# Patient Record
Sex: FEMALE | Race: WHITE | NOT HISPANIC OR LATINO
[De-identification: names, ages, dates, MRNs, and addresses within clinical notes are randomized per-mention and may not be internally consistent; named-entity substitution may affect disease eponyms.]

---

## 2017-01-11 ENCOUNTER — CHARTING TRANS (OUTPATIENT)
Dept: OTHER | Age: 47
End: 2017-01-11

## 2017-01-27 ENCOUNTER — CHARTING TRANS (OUTPATIENT)
Dept: OTOLARYNGOLOGY | Age: 47
End: 2017-01-27

## 2017-01-27 ENCOUNTER — MYAURORA ACCOUNT LINK (OUTPATIENT)
Dept: OTHER | Age: 47
End: 2017-01-27

## 2017-01-27 ENCOUNTER — LAB SERVICES (OUTPATIENT)
Dept: OTHER | Age: 47
End: 2017-01-27

## 2017-01-30 ENCOUNTER — CHARTING TRANS (OUTPATIENT)
Dept: OTHER | Age: 47
End: 2017-01-30

## 2017-01-30 LAB — FINAL REPORT: NORMAL

## 2017-02-17 ENCOUNTER — CHARTING TRANS (OUTPATIENT)
Dept: OTHER | Age: 47
End: 2017-02-17

## 2017-02-21 ENCOUNTER — IMAGING SERVICES (OUTPATIENT)
Dept: OTHER | Age: 47
End: 2017-02-21

## 2017-02-23 ENCOUNTER — CHARTING TRANS (OUTPATIENT)
Dept: OTHER | Age: 47
End: 2017-02-23

## 2017-03-09 ENCOUNTER — IMAGING SERVICES (OUTPATIENT)
Dept: OTHER | Age: 47
End: 2017-03-09

## 2017-03-09 ENCOUNTER — CHARTING TRANS (OUTPATIENT)
Dept: OTHER | Age: 47
End: 2017-03-09

## 2017-03-10 ENCOUNTER — CHARTING TRANS (OUTPATIENT)
Dept: ALLERGY | Age: 47
End: 2017-03-10

## 2017-04-20 ENCOUNTER — MYAURORA ACCOUNT LINK (OUTPATIENT)
Dept: OTHER | Age: 47
End: 2017-04-20

## 2017-04-20 ENCOUNTER — CHARTING TRANS (OUTPATIENT)
Dept: ALLERGY | Age: 47
End: 2017-04-20

## 2017-08-28 ENCOUNTER — CHARTING TRANS (OUTPATIENT)
Dept: OTHER | Age: 47
End: 2017-08-28

## 2017-09-12 ENCOUNTER — IMAGING SERVICES (OUTPATIENT)
Dept: OTHER | Age: 47
End: 2017-09-12

## 2017-09-16 ENCOUNTER — LAB SERVICES (OUTPATIENT)
Dept: OTHER | Age: 47
End: 2017-09-16

## 2017-09-16 ENCOUNTER — CHARTING TRANS (OUTPATIENT)
Dept: INTERNAL MEDICINE | Age: 47
End: 2017-09-16

## 2017-09-16 LAB
ALBUMIN SERPL BCG-MCNC: 4.5 G/DL (ref 3.6–5.1)
ALP SERPL-CCNC: 81 U/L (ref 45–105)
ALT SERPL W/O P-5'-P-CCNC: 14 U/L (ref 7–34)
AST SERPL-CCNC: 18 U/L (ref 9–37)
BILIRUB SERPL-MCNC: 0.6 MG/DL (ref 0–1)
BUN SERPL-MCNC: 14 MG/DL (ref 7–20)
CALCIUM SERPL-MCNC: 9.4 MG/DL (ref 8.6–10.6)
CHLORIDE SERPL-SCNC: 106 MMOL/L (ref 96–107)
CHOLEST SERPL-MCNC: 202 MG/DL (ref 140–200)
CREATININE, SERUM: 1 MG/DL (ref 0.5–1.4)
DIFFERENTIAL TYPE: NORMAL
GFR SERPL CREATININE-BSD FRML MDRD: >60 ML/MIN/{1.73M2}
GFR SERPL CREATININE-BSD FRML MDRD: >60 ML/MIN/{1.73M2}
GLUCOSE P FAST SERPL-MCNC: 103 MG/DL (ref 60–100)
HCO3 SERPL-SCNC: 25 MMOL/L (ref 22–32)
HDLC SERPL-MCNC: 94 MG/DL
HEMATOCRIT: 36.3 % (ref 34–45)
HEMOGLOBIN: 12.3 G/DL (ref 11.2–15.7)
LDLC SERPL CALC-MCNC: 96 MG/DL (ref 0–100)
LYMPH PERCENT: 35.8 % (ref 20.5–51.1)
LYMPHOCYTE ABSOLUTE #: 2.1 10*3/UL (ref 1.2–3.4)
MEAN CORPUSCULAR HGB CONCENTRATION: 33.9 % (ref 32–36)
MEAN CORPUSCULAR HGB: 31.5 PG (ref 27–34)
MEAN CORPUSCULAR VOLUME: 93.1 FL (ref 79–95)
MEAN PLATELET VOLUME: 9.9 FL (ref 8.6–12.4)
MIXED %: 7.6 % (ref 4.3–12.9)
MIXED ABSOLUTE #: 0.4 10*3/UL (ref 0.2–0.9)
NEUTROPHIL ABSOLUTE #: 3.3 10*3/UL (ref 1.4–6.5)
NEUTROPHIL PERCENT: 56.6 % (ref 34–73.5)
PLATELET COUNT: 233 10*3/UL (ref 150–400)
POTASSIUM SERPL-SCNC: 4.6 MMOL/L (ref 3.5–5.3)
PROT SERPL-MCNC: 7 G/DL (ref 6.2–8.1)
RED BLOOD CELL COUNT: 3.9 10*6/UL (ref 3.7–5.2)
RED CELL DISTRIBUTION WIDTH: 12.5 % (ref 11.3–14.8)
SODIUM SERPL-SCNC: 141 MMOL/L (ref 136–146)
TRIGL SERPL-MCNC: 60 MG/DL (ref 0–200)
TSH SERPL DL<=0.05 MIU/L-ACNC: 1.73 M[IU]/L (ref 0.3–4.82)
WHITE BLOOD CELL COUNT: 5.8 10*3/UL (ref 4–10)

## 2017-09-21 ENCOUNTER — CHARTING TRANS (OUTPATIENT)
Dept: OTHER | Age: 47
End: 2017-09-21

## 2017-09-22 ENCOUNTER — CHARTING TRANS (OUTPATIENT)
Dept: OTHER | Age: 47
End: 2017-09-22

## 2017-09-27 ENCOUNTER — LAB SERVICES (OUTPATIENT)
Dept: OTHER | Age: 47
End: 2017-09-27

## 2017-09-27 LAB
EST. AVERAGE GLUCOSE BLD GHB EST-MCNC: 105 MG/DL (ref 0–154)
HBA1C MFR BLD: 5.3 % (ref 4.2–6)

## 2017-10-03 LAB — ACL PAP: NORMAL

## 2017-11-21 ENCOUNTER — MYAURORA ACCOUNT LINK (OUTPATIENT)
Dept: OTHER | Age: 47
End: 2017-11-21

## 2017-11-21 ENCOUNTER — CHARTING TRANS (OUTPATIENT)
Dept: URGENT CARE | Age: 47
End: 2017-11-21

## 2017-11-21 ASSESSMENT — PAIN SCALES - GENERAL: PAINLEVEL_OUTOF10: 8

## 2017-11-27 ENCOUNTER — CHARTING TRANS (OUTPATIENT)
Dept: OTHER | Age: 47
End: 2017-11-27

## 2018-04-26 ENCOUNTER — MYAURORA ACCOUNT LINK (OUTPATIENT)
Dept: OTHER | Age: 48
End: 2018-04-26

## 2018-04-26 ENCOUNTER — CHARTING TRANS (OUTPATIENT)
Dept: OTHER | Age: 48
End: 2018-04-26

## 2018-06-04 ENCOUNTER — CHARTING TRANS (OUTPATIENT)
Dept: OTHER | Age: 48
End: 2018-06-04

## 2018-06-26 ENCOUNTER — IMAGING SERVICES (OUTPATIENT)
Dept: OTHER | Age: 48
End: 2018-06-26

## 2018-06-26 ENCOUNTER — ANCILLARY ORDERS (OUTPATIENT)
Dept: OTHER | Age: 48
End: 2018-06-26

## 2018-06-26 DIAGNOSIS — R92.8 OTHER ABNORMAL AND INCONCLUSIVE FINDINGS ON DIAGNOSTIC IMAGING OF BREAST: ICD-10-CM

## 2018-07-02 ENCOUNTER — CHARTING TRANS (OUTPATIENT)
Dept: OTHER | Age: 48
End: 2018-07-02

## 2018-07-26 ENCOUNTER — CHARTING TRANS (OUTPATIENT)
Dept: OTHER | Age: 48
End: 2018-07-26

## 2018-07-27 ENCOUNTER — CHARTING TRANS (OUTPATIENT)
Dept: OTHER | Age: 48
End: 2018-07-27

## 2018-08-11 ENCOUNTER — CHARTING TRANS (OUTPATIENT)
Dept: OTHER | Age: 48
End: 2018-08-11

## 2018-08-11 ENCOUNTER — MYAURORA ACCOUNT LINK (OUTPATIENT)
Dept: OTHER | Age: 48
End: 2018-08-11

## 2018-08-11 ASSESSMENT — PAIN SCALES - GENERAL: PAINLEVEL_OUTOF10: 3

## 2018-09-24 ENCOUNTER — CHARTING TRANS (OUTPATIENT)
Dept: OTHER | Age: 48
End: 2018-09-24

## 2018-10-05 ENCOUNTER — CHARTING TRANS (OUTPATIENT)
Dept: OTHER | Age: 48
End: 2018-10-05

## 2018-10-05 ENCOUNTER — MYAURORA ACCOUNT LINK (OUTPATIENT)
Dept: OTHER | Age: 48
End: 2018-10-05

## 2018-10-06 ENCOUNTER — CHARTING TRANS (OUTPATIENT)
Dept: OTHER | Age: 48
End: 2018-10-06

## 2018-10-12 ENCOUNTER — LAB SERVICES (OUTPATIENT)
Dept: OTHER | Age: 48
End: 2018-10-12

## 2018-10-12 LAB
ALBUMIN SERPL BCG-MCNC: 4.4 G/DL (ref 3.6–5.1)
ALP SERPL-CCNC: 74 U/L (ref 45–130)
ALT SERPL W/O P-5'-P-CCNC: 14 U/L (ref 7–34)
AST SERPL-CCNC: 19 U/L (ref 9–37)
BILIRUB SERPL-MCNC: 1 MG/DL (ref 0–1)
BUN SERPL-MCNC: 12 MG/DL (ref 7–20)
CALCIUM SERPL-MCNC: 9.4 MG/DL (ref 8.6–10.6)
CHLORIDE SERPL-SCNC: 106 MMOL/L (ref 96–107)
CHOLEST SERPL-MCNC: 212 MG/DL (ref 140–200)
CREAT SERPL-MCNC: 0.9 MG/DL (ref 0.5–1.4)
DIFFERENTIAL TYPE: NORMAL
GFR SERPL CREATININE-BSD FRML MDRD: >60 ML/MIN/{1.73M2}
GFR SERPL CREATININE-BSD FRML MDRD: >60 ML/MIN/{1.73M2}
GLUCOSE P FAST SERPL-MCNC: 104 MG/DL (ref 60–100)
HCO3 SERPL-SCNC: 25 MMOL/L (ref 22–32)
HDLC SERPL-MCNC: 91 MG/DL
HEMATOCRIT: 40.6 % (ref 34–45)
HEMOGLOBIN: 14 G/DL (ref 11.2–15.7)
LDLC SERPL CALC-MCNC: 106 MG/DL (ref 0–100)
LYMPH PERCENT: 26.6 % (ref 20.5–51.1)
LYMPHOCYTE ABSOLUTE #: 1.7 10*3/UL (ref 1.2–3.4)
MEAN CORPUSCULAR HGB CONCENTRATION: 34.5 % (ref 32–36)
MEAN CORPUSCULAR HGB: 32.4 PG (ref 27–34)
MEAN CORPUSCULAR VOLUME: 94 FL (ref 79–95)
MEAN PLATELET VOLUME: 10.1 FL (ref 8.6–12.4)
MIXED %: 9.8 % (ref 4.3–12.9)
MIXED ABSOLUTE #: 0.6 10*3/UL (ref 0.2–0.9)
NEUTROPHIL ABSOLUTE #: 4.1 10*3/UL (ref 1.4–6.5)
NEUTROPHIL PERCENT: 63.6 % (ref 34–73.5)
PLATELET COUNT: 234 10*3/UL (ref 150–400)
POTASSIUM SERPL-SCNC: 4.4 MMOL/L (ref 3.5–5.3)
PROT SERPL-MCNC: 7.4 G/DL (ref 6.2–8.1)
RED BLOOD CELL COUNT: 4.32 10*6/UL (ref 3.7–5.2)
RED CELL DISTRIBUTION WIDTH: 12.8 % (ref 11.3–14.8)
SODIUM SERPL-SCNC: 142 MMOL/L (ref 136–146)
TRIGL SERPL-MCNC: 77 MG/DL (ref 0–200)
TSH SERPL DL<=0.05 MIU/L-ACNC: 1.05 M[IU]/L (ref 0.3–4.82)
WHITE BLOOD CELL COUNT: 6.4 10*3/UL (ref 4–10)

## 2018-10-16 ENCOUNTER — CHARTING TRANS (OUTPATIENT)
Dept: OTHER | Age: 48
End: 2018-10-16

## 2018-10-18 ENCOUNTER — CHARTING TRANS (OUTPATIENT)
Dept: OTHER | Age: 48
End: 2018-10-18

## 2018-10-31 ENCOUNTER — CHARTING TRANS (OUTPATIENT)
Dept: OTHER | Age: 48
End: 2018-10-31

## 2018-11-02 ENCOUNTER — CHARTING TRANS (OUTPATIENT)
Dept: OTHER | Age: 48
End: 2018-11-02

## 2018-11-27 VITALS
TEMPERATURE: 98.8 F | DIASTOLIC BLOOD PRESSURE: 94 MMHG | OXYGEN SATURATION: 97 % | HEART RATE: 100 BPM | RESPIRATION RATE: 18 BRPM | SYSTOLIC BLOOD PRESSURE: 132 MMHG

## 2018-11-28 VITALS
TEMPERATURE: 98.6 F | BODY MASS INDEX: 34.16 KG/M2 | DIASTOLIC BLOOD PRESSURE: 78 MMHG | HEIGHT: 60 IN | SYSTOLIC BLOOD PRESSURE: 146 MMHG | WEIGHT: 174 LBS | HEART RATE: 64 BPM

## 2018-11-28 VITALS
WEIGHT: 175 LBS | BODY MASS INDEX: 34.36 KG/M2 | HEART RATE: 71 BPM | OXYGEN SATURATION: 96 % | SYSTOLIC BLOOD PRESSURE: 150 MMHG | DIASTOLIC BLOOD PRESSURE: 110 MMHG | HEIGHT: 60 IN | TEMPERATURE: 98.6 F

## 2018-11-29 VITALS
SYSTOLIC BLOOD PRESSURE: 136 MMHG | HEIGHT: 60 IN | TEMPERATURE: 97.2 F | BODY MASS INDEX: 34.16 KG/M2 | HEART RATE: 80 BPM | WEIGHT: 174 LBS | DIASTOLIC BLOOD PRESSURE: 98 MMHG

## 2018-11-29 VITALS — HEIGHT: 60 IN | WEIGHT: 170 LBS | BODY MASS INDEX: 33.38 KG/M2

## 2018-12-10 ENCOUNTER — TELEPHONE (OUTPATIENT)
Dept: GASTROENTEROLOGY | Age: 48
End: 2018-12-10

## 2018-12-10 ENCOUNTER — OFFICE VISIT (OUTPATIENT)
Dept: GASTROENTEROLOGY | Age: 48
End: 2018-12-10

## 2018-12-10 DIAGNOSIS — Z12.11 SPECIAL SCREENING FOR MALIGNANT NEOPLASMS, COLON: Primary | ICD-10-CM

## 2018-12-10 LAB — PREGNANCY TEST, URINE: NEGATIVE

## 2018-12-10 PROCEDURE — G0121 COLON CA SCRN NOT HI RSK IND: HCPCS | Performed by: INTERNAL MEDICINE

## 2018-12-18 RX ORDER — LOSARTAN POTASSIUM 25 MG/1
TABLET ORAL
Qty: 90 TABLET | Refills: 0 | Status: SHIPPED | OUTPATIENT
Start: 2018-12-18 | End: 2019-04-19 | Stop reason: SDUPTHER

## 2019-01-03 ENCOUNTER — WALK IN (OUTPATIENT)
Dept: URGENT CARE | Age: 49
End: 2019-01-03

## 2019-01-03 DIAGNOSIS — J32.9 SINUSITIS, UNSPECIFIED CHRONICITY, UNSPECIFIED LOCATION: Primary | ICD-10-CM

## 2019-01-03 DIAGNOSIS — J98.01 COUGH DUE TO BRONCHOSPASM: ICD-10-CM

## 2019-01-03 DIAGNOSIS — J30.9 ALLERGIC RHINITIS, UNSPECIFIED SEASONALITY, UNSPECIFIED TRIGGER: ICD-10-CM

## 2019-01-03 PROBLEM — F41.1 GAD (GENERALIZED ANXIETY DISORDER): Status: ACTIVE | Noted: 2019-01-03

## 2019-01-03 PROBLEM — J30.2 SEASONAL ALLERGIES: Status: ACTIVE | Noted: 2019-01-03

## 2019-01-03 PROBLEM — J45.20 MILD INTERMITTENT ASTHMA WITHOUT COMPLICATION: Status: ACTIVE | Noted: 2017-04-20

## 2019-01-03 PROCEDURE — 99214 OFFICE O/P EST MOD 30 MIN: CPT | Performed by: FAMILY MEDICINE

## 2019-01-03 RX ORDER — PREDNISONE 20 MG/1
40 TABLET ORAL DAILY
Qty: 10 TABLET | Refills: 0 | Status: SHIPPED | OUTPATIENT
Start: 2019-01-03 | End: 2019-01-08

## 2019-01-03 RX ORDER — ALBUTEROL SULFATE 90 UG/1
AEROSOL, METERED RESPIRATORY (INHALATION)
Qty: 1 INHALER | Refills: 0 | Status: SHIPPED | OUTPATIENT
Start: 2019-01-03 | End: 2020-01-06 | Stop reason: ALTCHOICE

## 2019-01-03 RX ORDER — SERTRALINE HYDROCHLORIDE 25 MG/1
25 TABLET, FILM COATED ORAL
COMMUNITY
Start: 2011-12-22 | End: 2019-01-03 | Stop reason: SDUPTHER

## 2019-01-03 RX ORDER — AMOXICILLIN AND CLAVULANATE POTASSIUM 875; 125 MG/1; MG/1
1 TABLET, FILM COATED ORAL 2 TIMES DAILY
Qty: 20 TABLET | Refills: 0 | Status: SHIPPED | OUTPATIENT
Start: 2019-01-03 | End: 2019-01-13

## 2019-01-03 RX ORDER — MOMETASONE FUROATE 50 UG/1
2 SPRAY, METERED NASAL
COMMUNITY
Start: 2011-02-10 | End: 2021-05-10 | Stop reason: ALTCHOICE

## 2019-01-03 ASSESSMENT — PAIN SCALES - GENERAL: PAINLEVEL: 3-4

## 2019-01-04 RX ORDER — SERTRALINE HYDROCHLORIDE 25 MG/1
TABLET, FILM COATED ORAL
Qty: 90 TABLET | Refills: 0 | Status: SHIPPED | OUTPATIENT
Start: 2019-01-04 | End: 2019-11-23 | Stop reason: ALTCHOICE

## 2019-01-11 ENCOUNTER — APPOINTMENT (OUTPATIENT)
Dept: ULTRASOUND IMAGING | Age: 49
End: 2019-01-11
Attending: INTERNAL MEDICINE

## 2019-01-11 ENCOUNTER — IMAGING SERVICES (OUTPATIENT)
Dept: MAMMOGRAPHY | Age: 49
End: 2019-01-11
Attending: INTERNAL MEDICINE

## 2019-01-11 ENCOUNTER — IMAGING SERVICES (OUTPATIENT)
Dept: ULTRASOUND IMAGING | Age: 49
End: 2019-01-11

## 2019-01-11 DIAGNOSIS — R92.8 OTHER ABNORMAL AND INCONCLUSIVE FINDINGS ON DIAGNOSTIC IMAGING OF BREAST: ICD-10-CM

## 2019-01-11 PROCEDURE — 77061 BREAST TOMOSYNTHESIS UNI: CPT | Performed by: RADIOLOGY

## 2019-01-11 PROCEDURE — 77065 DX MAMMO INCL CAD UNI: CPT | Performed by: RADIOLOGY

## 2019-01-11 PROCEDURE — 76642 ULTRASOUND BREAST LIMITED: CPT | Performed by: RADIOLOGY

## 2019-02-04 ENCOUNTER — WALK IN (OUTPATIENT)
Dept: URGENT CARE | Age: 49
End: 2019-02-04

## 2019-02-04 DIAGNOSIS — J32.9 SINUSITIS, UNSPECIFIED CHRONICITY, UNSPECIFIED LOCATION: Primary | ICD-10-CM

## 2019-02-04 PROCEDURE — 99214 OFFICE O/P EST MOD 30 MIN: CPT | Performed by: FAMILY MEDICINE

## 2019-02-04 RX ORDER — AMOXICILLIN AND CLAVULANATE POTASSIUM 875; 125 MG/1; MG/1
1 TABLET, FILM COATED ORAL 2 TIMES DAILY
Qty: 20 TABLET | Refills: 0 | Status: SHIPPED | OUTPATIENT
Start: 2019-02-04 | End: 2019-02-14

## 2019-02-04 ASSESSMENT — PAIN SCALES - GENERAL: PAINLEVEL: 0

## 2019-02-11 RX ORDER — ALPRAZOLAM 0.25 MG/1
TABLET ORAL
COMMUNITY
End: 2019-06-10 | Stop reason: SDUPTHER

## 2019-02-11 RX ORDER — DIPHENHYDRAMINE HCL 25 MG
TABLET ORAL
COMMUNITY
End: 2021-06-24 | Stop reason: SDUPTHER

## 2019-02-21 ENCOUNTER — OFFICE VISIT (OUTPATIENT)
Dept: DERMATOLOGY | Age: 49
End: 2019-02-21

## 2019-02-21 DIAGNOSIS — D23.71 DERMATOFIBROMA OF RIGHT LOWER LEG: Primary | ICD-10-CM

## 2019-02-21 PROCEDURE — 99212 OFFICE O/P EST SF 10 MIN: CPT | Performed by: DERMATOLOGY

## 2019-02-22 ENCOUNTER — TELEPHONE (OUTPATIENT)
Dept: SCHEDULING | Age: 49
End: 2019-02-22

## 2019-02-22 ENCOUNTER — LAB SERVICES (OUTPATIENT)
Dept: LAB | Age: 49
End: 2019-02-22

## 2019-02-22 ENCOUNTER — OFFICE VISIT (OUTPATIENT)
Dept: ALLERGY | Age: 49
End: 2019-02-22

## 2019-02-22 VITALS
WEIGHT: 178 LBS | DIASTOLIC BLOOD PRESSURE: 90 MMHG | HEIGHT: 60 IN | SYSTOLIC BLOOD PRESSURE: 144 MMHG | HEART RATE: 78 BPM | TEMPERATURE: 98.9 F | BODY MASS INDEX: 34.95 KG/M2

## 2019-02-22 DIAGNOSIS — R05.9 COUGH: ICD-10-CM

## 2019-02-22 DIAGNOSIS — J45.21 MILD INTERMITTENT ASTHMA WITH ACUTE EXACERBATION: ICD-10-CM

## 2019-02-22 DIAGNOSIS — J32.9 CHRONIC RECURRENT SINUSITIS: Primary | ICD-10-CM

## 2019-02-22 DIAGNOSIS — J32.9 CHRONIC RECURRENT SINUSITIS: ICD-10-CM

## 2019-02-22 DIAGNOSIS — J30.1 ALLERGIC RHINITIS DUE TO POLLEN, UNSPECIFIED SEASONALITY: ICD-10-CM

## 2019-02-22 PROCEDURE — 82787 IGG 1 2 3 OR 4 EACH: CPT | Performed by: ALLERGY & IMMUNOLOGY

## 2019-02-22 PROCEDURE — 94010 BREATHING CAPACITY TEST: CPT | Performed by: ALLERGY & IMMUNOLOGY

## 2019-02-22 PROCEDURE — 82784 ASSAY IGA/IGD/IGG/IGM EACH: CPT | Performed by: ALLERGY & IMMUNOLOGY

## 2019-02-22 PROCEDURE — 86684 HEMOPHILUS INFLUENZA ANTIBDY: CPT | Performed by: ALLERGY & IMMUNOLOGY

## 2019-02-22 PROCEDURE — 82785 ASSAY OF IGE: CPT | Performed by: ALLERGY & IMMUNOLOGY

## 2019-02-22 PROCEDURE — 36415 COLL VENOUS BLD VENIPUNCTURE: CPT | Performed by: ALLERGY & IMMUNOLOGY

## 2019-02-22 PROCEDURE — 86317 IMMUNOASSAY INFECTIOUS AGENT: CPT | Performed by: ALLERGY & IMMUNOLOGY

## 2019-02-22 PROCEDURE — 86774 TETANUS ANTIBODY: CPT | Performed by: ALLERGY & IMMUNOLOGY

## 2019-02-22 PROCEDURE — 99214 OFFICE O/P EST MOD 30 MIN: CPT | Performed by: ALLERGY & IMMUNOLOGY

## 2019-02-22 PROCEDURE — 86003 ALLG SPEC IGE CRUDE XTRC EA: CPT | Performed by: ALLERGY & IMMUNOLOGY

## 2019-02-22 RX ORDER — PREDNISONE 10 MG/1
30 TABLET ORAL DAILY
Qty: 15 TABLET | Refills: 0 | Status: SHIPPED | OUTPATIENT
Start: 2019-02-22 | End: 2019-02-27

## 2019-02-22 RX ORDER — BUDESONIDE 0.5 MG/2ML
INHALANT ORAL
Qty: 120 ML | Refills: 5 | Status: SHIPPED | OUTPATIENT
Start: 2019-02-22 | End: 2021-06-17 | Stop reason: ALTCHOICE

## 2019-02-23 LAB
IGA SERPL-MCNC: 282 MG/DL (ref 70–400)
IGG SERPL-MCNC: 1075 MG/DL (ref 700–1600)
IGM SERPL-MCNC: 86 MG/DL (ref 40–230)

## 2019-02-25 LAB
A ALTERNATA IGE QN: <0.1 KU/L (ref 0–0.1)
A FUMIGATUS IGE QN: <0.1 KU/L (ref 0–0.1)
A NIGER IGE QN: <0.1 KU/L (ref 0–0.1)
A PULLULANS IGE QN: <0.1 KU/L (ref 0–0.1)
AMER BEECH IGE QN: <0.1 KU/L (ref 0–0.1)
BERMUDA GRASS IGE QN: <0.1 KU/L (ref 0–0.1)
BOXELDER IGE QN: <0.1 KU/L (ref 0–0.1)
C HERBARUM IGE QN: <0.1 KU/L (ref 0–0.1)
CALIF WALNUT POLN IGE QN: <0.1 KU/L (ref 0–0.1)
CAT DANDER IGE QN: <0.1 KU/L (ref 0–0.1)
COCKSFOOT IGE QN: <0.1 KU/L (ref 0–0.1)
COMMON RAGWEED IGE QN: <0.1 KU/L (ref 0–0.1)
D FARINAE IGE QN: <0.1 KU/L (ref 0–0.1)
D PTERONYSS IGE QN: <0.1 KU/L (ref 0–0.1)
DOG DANDER IGE QN: <0.1 KU/L (ref 0–0.1)
E PURPURASCENS IGE QN: <0.1 KU/L (ref 0–0.1)
ENGL PLANTAIN IGE QN: <0.1 KU/L (ref 0–0.1)
F MONILIFORME IGE QN: <0.1 KU/L (ref 0–0.1)
GIANT RAGWEED IGE QN: <0.1 KU/L (ref 0–0.1)
IGG1 SER-MCNC: 500.3 MG/DL (ref 382.4–928.6)
IGG2 SER-MCNC: 364.4 MG/DL (ref 241.8–700.3)
IGG3 SER-MCNC: 216.8 MG/DL (ref 21.8–176.1)
IGG4 SER-MCNC: 4.9 MG/DL (ref 3.9–86.4)
JOHNSON GRASS IGE QN: <0.1 KU/L (ref 0–0.1)
KENT BLUE GRASS IGE QN: <0.1 KU/L (ref 0–0.1)
LONDON PLANE IGE QN: <0.1 KU/L (ref 0–0.1)
M RACEMOSUS IGE QN: <0.1 KU/L (ref 0–0.1)
MUGWORT IGE QN: <0.1 KU/L (ref 0–0.1)
P BETAE IGE QN: <0.1 KU/L (ref 0–0.1)
P NOTATUM IGE QN: <0.1 KU/L (ref 0–0.1)
PECAN/HICK TREE IGE QN: <0.1 KU/L (ref 0–0.1)
PER RYE GRASS IGE QN: <0.1 KU/L (ref 0–0.1)
RED CEDAR IGE QN: <0.1 KU/L (ref 0–0.1)
RED TOP GRASS IGE QN: <0.1 KU/L (ref 0–0.1)
ROACH IGE QN: <0.1 KU/L (ref 0–0.1)
S ROSTRATA IGE QN: <0.1 KU/L (ref 0–0.1)
SALTWORT IGE QN: <0.1 KU/L (ref 0–0.1)
SILVER BIRCH IGE QN: <0.1 KU/L (ref 0–0.1)
TIMOTHY IGE QN: <0.1 KU/L (ref 0–0.1)
TOTAL IGE SMQN RAST: 73 KU/L (ref 0–100)
WHITE ASH IGE QN: <0.1 KU/L (ref 0–0.1)
WHITE ELM IGE QN: <0.1 KU/L (ref 0–0.1)
WHITE OAK IGE QN: <0.1 KU/L (ref 0–0.1)
WILLOW IGE QN: <0.1 KU/L (ref 0–0.1)

## 2019-02-26 LAB
C TETANI IGG SER IA-ACNC: 1.2
DEPRECATED S PNEUM 1 IGG SER-MCNC: 0.13 UG/ML
DEPRECATED S PNEUM12 IGG SER-MCNC: 0.05 UG/ML
DEPRECATED S PNEUM14 IGG SER-MCNC: 2.47 UG/ML
DEPRECATED S PNEUM19 IGG SER-MCNC: 3.91 UG/ML
DEPRECATED S PNEUM23 IGG SER-MCNC: 3.55 UG/ML
DEPRECATED S PNEUM3 IGG SER-MCNC: 0.58 UG/ML
DEPRECATED S PNEUM4 IGG SER-MCNC: 0.24 UG/ML
DEPRECATED S PNEUM5 IGG SER-MCNC: 4.32 UG/ML
DEPRECATED S PNEUM8 IGG SER-MCNC: 0.35 UG/ML
DEPRECATED S PNEUM9 IGG SER-MCNC: 0.29 UG/ML
HAEM INFLU B IGG SER IA-MCNC: 2.1 UG/ML
S PNEUM DA 18C IGG SER-MCNC: 0.42 UG/ML
S PNEUM DA 6B IGG SER-MCNC: 0.71 UG/ML
S PNEUM DA 7F IGG SER-MCNC: 0.21 UG/ML
S PNEUM DA 9V IGG SER-MCNC: 0.76 UG/ML
S PNEUM SEROTYPE IGG SER-IMP: NORMAL

## 2019-03-05 VITALS
HEART RATE: 77 BPM | TEMPERATURE: 97.8 F | OXYGEN SATURATION: 98 % | BODY MASS INDEX: 34.18 KG/M2 | SYSTOLIC BLOOD PRESSURE: 155 MMHG | DIASTOLIC BLOOD PRESSURE: 107 MMHG | WEIGHT: 175 LBS | RESPIRATION RATE: 16 BRPM

## 2019-03-05 VITALS — DIASTOLIC BLOOD PRESSURE: 90 MMHG | SYSTOLIC BLOOD PRESSURE: 134 MMHG | HEART RATE: 82 BPM

## 2019-03-05 VITALS — HEART RATE: 60 BPM | DIASTOLIC BLOOD PRESSURE: 92 MMHG | SYSTOLIC BLOOD PRESSURE: 164 MMHG

## 2019-03-06 VITALS
OXYGEN SATURATION: 98 % | SYSTOLIC BLOOD PRESSURE: 124 MMHG | HEART RATE: 85 BPM | DIASTOLIC BLOOD PRESSURE: 78 MMHG | TEMPERATURE: 98.6 F

## 2019-03-07 ENCOUNTER — OFFICE VISIT (OUTPATIENT)
Dept: DERMATOLOGY | Age: 49
End: 2019-03-07

## 2019-03-07 DIAGNOSIS — D23.71 DERMATOFIBROMA OF RIGHT LOWER LEG: Primary | ICD-10-CM

## 2019-03-07 DIAGNOSIS — D48.9 NEOPLASM OF UNCERTAIN BEHAVIOR: ICD-10-CM

## 2019-03-07 PROCEDURE — 12031 INTMD RPR S/A/T/EXT 2.5 CM/<: CPT | Performed by: DERMATOLOGY

## 2019-03-07 PROCEDURE — 11401 EXC TR-EXT B9+MARG 0.6-1 CM: CPT | Performed by: DERMATOLOGY

## 2019-03-12 LAB — PATH REPORT PLASRBC-IMP: NORMAL

## 2019-03-21 ENCOUNTER — NURSE ONLY (OUTPATIENT)
Dept: DERMATOLOGY | Age: 49
End: 2019-03-21

## 2019-03-21 DIAGNOSIS — Z48.02 ENCOUNTER FOR REMOVAL OF SUTURES: Primary | ICD-10-CM

## 2019-03-21 PROCEDURE — X1094 NO CHARGE VISIT: HCPCS | Performed by: DERMATOLOGY

## 2019-04-05 ENCOUNTER — APPOINTMENT (OUTPATIENT)
Dept: ALLERGY | Age: 49
End: 2019-04-05

## 2019-04-06 ENCOUNTER — TELEPHONE (OUTPATIENT)
Dept: ALLERGY | Age: 49
End: 2019-04-06

## 2019-04-19 ENCOUNTER — OFFICE VISIT (OUTPATIENT)
Dept: ALLERGY | Age: 49
End: 2019-04-19

## 2019-04-19 VITALS
HEIGHT: 60 IN | TEMPERATURE: 98.7 F | WEIGHT: 175 LBS | DIASTOLIC BLOOD PRESSURE: 90 MMHG | BODY MASS INDEX: 34.36 KG/M2 | SYSTOLIC BLOOD PRESSURE: 132 MMHG

## 2019-04-19 DIAGNOSIS — J32.9 CHRONIC SINUSITIS, UNSPECIFIED LOCATION: Primary | ICD-10-CM

## 2019-04-19 DIAGNOSIS — J30.9 ALLERGIC RHINITIS, UNSPECIFIED SEASONALITY, UNSPECIFIED TRIGGER: ICD-10-CM

## 2019-04-19 DIAGNOSIS — J45.30 MILD PERSISTENT ASTHMA WITHOUT COMPLICATION: ICD-10-CM

## 2019-04-19 DIAGNOSIS — R05.9 COUGH: ICD-10-CM

## 2019-04-19 PROCEDURE — 90471 IMMUNIZATION ADMIN: CPT

## 2019-04-19 PROCEDURE — 90732 PPSV23 VACC 2 YRS+ SUBQ/IM: CPT

## 2019-04-19 PROCEDURE — 94010 BREATHING CAPACITY TEST: CPT | Performed by: ALLERGY & IMMUNOLOGY

## 2019-04-19 PROCEDURE — 99214 OFFICE O/P EST MOD 30 MIN: CPT | Performed by: ALLERGY & IMMUNOLOGY

## 2019-04-19 RX ORDER — MONTELUKAST SODIUM 10 MG/1
10 TABLET ORAL NIGHTLY
Qty: 30 TABLET | Refills: 5 | Status: SHIPPED | OUTPATIENT
Start: 2019-04-19 | End: 2020-02-01 | Stop reason: SDUPTHER

## 2019-04-24 RX ORDER — LOSARTAN POTASSIUM 25 MG/1
25 TABLET ORAL DAILY
Qty: 30 TABLET | Refills: 0 | Status: SHIPPED | OUTPATIENT
Start: 2019-04-24 | End: 2019-06-10 | Stop reason: SDUPTHER

## 2019-05-09 ENCOUNTER — E-ADVICE (OUTPATIENT)
Dept: ALLERGY | Age: 49
End: 2019-05-09

## 2019-06-10 ENCOUNTER — OFFICE VISIT (OUTPATIENT)
Dept: INTERNAL MEDICINE | Age: 49
End: 2019-06-10

## 2019-06-10 VITALS
SYSTOLIC BLOOD PRESSURE: 140 MMHG | HEART RATE: 72 BPM | HEIGHT: 60 IN | DIASTOLIC BLOOD PRESSURE: 90 MMHG | TEMPERATURE: 98.8 F | RESPIRATION RATE: 20 BRPM | OXYGEN SATURATION: 99 % | WEIGHT: 181 LBS | BODY MASS INDEX: 35.53 KG/M2

## 2019-06-10 DIAGNOSIS — F41.1 ANXIETY STATE: Primary | ICD-10-CM

## 2019-06-10 DIAGNOSIS — I10 ESSENTIAL HYPERTENSION: ICD-10-CM

## 2019-06-10 PROCEDURE — 99213 OFFICE O/P EST LOW 20 MIN: CPT | Performed by: INTERNAL MEDICINE

## 2019-06-10 RX ORDER — LOSARTAN POTASSIUM 25 MG/1
25 TABLET ORAL DAILY
Qty: 90 TABLET | Refills: 1 | Status: SHIPPED | OUTPATIENT
Start: 2019-06-10 | End: 2019-12-10 | Stop reason: SDUPTHER

## 2019-06-10 RX ORDER — ALPRAZOLAM 0.25 MG/1
0.25 TABLET ORAL NIGHTLY PRN
Qty: 30 TABLET | Refills: 0 | Status: SHIPPED | OUTPATIENT
Start: 2019-06-10 | End: 2020-07-28 | Stop reason: SDUPTHER

## 2019-06-10 SDOH — HEALTH STABILITY: PHYSICAL HEALTH: ON AVERAGE, HOW MANY DAYS PER WEEK DO YOU ENGAGE IN MODERATE TO STRENUOUS EXERCISE (LIKE A BRISK WALK)?: 0 DAYS

## 2019-06-10 SDOH — HEALTH STABILITY: MENTAL HEALTH
STRESS IS WHEN SOMEONE FEELS TENSE, NERVOUS, ANXIOUS, OR CAN'T SLEEP AT NIGHT BECAUSE THEIR MIND IS TROUBLED. HOW STRESSED ARE YOU?: ONLY A LITTLE

## 2019-06-10 ASSESSMENT — PATIENT HEALTH QUESTIONNAIRE - PHQ9
SUM OF ALL RESPONSES TO PHQ9 QUESTIONS 1 AND 2: 0
1. LITTLE INTEREST OR PLEASURE IN DOING THINGS: NOT AT ALL
2. FEELING DOWN, DEPRESSED OR HOPELESS: NOT AT ALL
SUM OF ALL RESPONSES TO PHQ9 QUESTIONS 1 AND 2: 0

## 2019-11-23 ENCOUNTER — WALK IN (OUTPATIENT)
Dept: URGENT CARE | Age: 49
End: 2019-11-23

## 2019-11-23 DIAGNOSIS — J32.1 CHRONIC FRONTAL SINUSITIS: Primary | ICD-10-CM

## 2019-11-23 PROCEDURE — 99214 OFFICE O/P EST MOD 30 MIN: CPT | Performed by: FAMILY MEDICINE

## 2019-11-23 PROCEDURE — 3077F SYST BP >= 140 MM HG: CPT | Performed by: FAMILY MEDICINE

## 2019-11-23 PROCEDURE — 3080F DIAST BP >= 90 MM HG: CPT | Performed by: FAMILY MEDICINE

## 2019-11-23 RX ORDER — CEFUROXIME AXETIL 500 MG/1
500 TABLET ORAL 2 TIMES DAILY
Qty: 28 TABLET | Refills: 0 | Status: SHIPPED | OUTPATIENT
Start: 2019-11-23 | End: 2019-12-07

## 2019-11-23 RX ORDER — PREDNISONE 50 MG/1
50 TABLET ORAL DAILY
Qty: 7 TABLET | Refills: 0 | Status: SHIPPED | OUTPATIENT
Start: 2019-11-23 | End: 2019-11-30

## 2019-11-23 ASSESSMENT — PAIN SCALES - GENERAL: PAINLEVEL: 3-4

## 2019-12-10 DIAGNOSIS — I10 ESSENTIAL HYPERTENSION: ICD-10-CM

## 2019-12-11 ENCOUNTER — WALK IN (OUTPATIENT)
Dept: URGENT CARE | Age: 49
End: 2019-12-11

## 2019-12-11 DIAGNOSIS — J40 BRONCHITIS: ICD-10-CM

## 2019-12-11 DIAGNOSIS — J45.21 MILD INTERMITTENT ASTHMA WITH ACUTE EXACERBATION: Primary | ICD-10-CM

## 2019-12-11 PROCEDURE — 3080F DIAST BP >= 90 MM HG: CPT | Performed by: FAMILY MEDICINE

## 2019-12-11 PROCEDURE — 99214 OFFICE O/P EST MOD 30 MIN: CPT | Performed by: FAMILY MEDICINE

## 2019-12-11 PROCEDURE — 3074F SYST BP LT 130 MM HG: CPT | Performed by: FAMILY MEDICINE

## 2019-12-11 RX ORDER — PREDNISONE 50 MG/1
50 TABLET ORAL DAILY
Qty: 5 TABLET | Refills: 0 | Status: SHIPPED | OUTPATIENT
Start: 2019-12-11 | End: 2019-12-16

## 2019-12-11 RX ORDER — AZITHROMYCIN 250 MG/1
TABLET, FILM COATED ORAL
Qty: 6 TABLET | Refills: 0 | Status: SHIPPED | OUTPATIENT
Start: 2019-12-11 | End: 2019-12-16

## 2019-12-11 RX ORDER — ALBUTEROL SULFATE 90 UG/1
AEROSOL, METERED RESPIRATORY (INHALATION)
Qty: 1 INHALER | Refills: 0 | Status: SHIPPED | OUTPATIENT
Start: 2019-12-11 | End: 2020-12-07

## 2019-12-11 ASSESSMENT — PAIN SCALES - GENERAL: PAINLEVEL: 0

## 2019-12-14 RX ORDER — LOSARTAN POTASSIUM 25 MG/1
25 TABLET ORAL DAILY
Qty: 30 TABLET | Refills: 0 | Status: SHIPPED | OUTPATIENT
Start: 2019-12-14 | End: 2020-01-06 | Stop reason: DRUGHIGH

## 2019-12-19 ENCOUNTER — APPOINTMENT (OUTPATIENT)
Dept: ALLERGY | Age: 49
End: 2019-12-19

## 2020-01-06 ENCOUNTER — APPOINTMENT (OUTPATIENT)
Dept: INTERNAL MEDICINE | Age: 50
End: 2020-01-06

## 2020-01-06 ENCOUNTER — OFFICE VISIT (OUTPATIENT)
Dept: INTERNAL MEDICINE | Age: 50
End: 2020-01-06

## 2020-01-06 VITALS
WEIGHT: 175 LBS | SYSTOLIC BLOOD PRESSURE: 148 MMHG | HEART RATE: 88 BPM | DIASTOLIC BLOOD PRESSURE: 100 MMHG | TEMPERATURE: 98.9 F | HEIGHT: 60 IN | BODY MASS INDEX: 34.36 KG/M2 | OXYGEN SATURATION: 98 %

## 2020-01-06 DIAGNOSIS — I10 UNCONTROLLED HYPERTENSION: Primary | ICD-10-CM

## 2020-01-06 DIAGNOSIS — R73.01 IMPAIRED FASTING GLUCOSE: ICD-10-CM

## 2020-01-06 DIAGNOSIS — Z12.31 ENCOUNTER FOR SCREENING MAMMOGRAM FOR MALIGNANT NEOPLASM OF BREAST: ICD-10-CM

## 2020-01-06 PROCEDURE — 99214 OFFICE O/P EST MOD 30 MIN: CPT | Performed by: INTERNAL MEDICINE

## 2020-01-06 PROCEDURE — 3080F DIAST BP >= 90 MM HG: CPT | Performed by: INTERNAL MEDICINE

## 2020-01-06 PROCEDURE — 3077F SYST BP >= 140 MM HG: CPT | Performed by: INTERNAL MEDICINE

## 2020-01-06 RX ORDER — LOSARTAN POTASSIUM 50 MG/1
50 TABLET ORAL DAILY
Qty: 90 TABLET | Refills: 1 | Status: SHIPPED | OUTPATIENT
Start: 2020-01-06 | End: 2020-01-24 | Stop reason: SDUPTHER

## 2020-01-06 ASSESSMENT — PATIENT HEALTH QUESTIONNAIRE - PHQ9
1. LITTLE INTEREST OR PLEASURE IN DOING THINGS: NOT AT ALL
SUM OF ALL RESPONSES TO PHQ9 QUESTIONS 1 AND 2: 0
2. FEELING DOWN, DEPRESSED OR HOPELESS: NOT AT ALL
SUM OF ALL RESPONSES TO PHQ9 QUESTIONS 1 AND 2: 0

## 2020-01-18 ENCOUNTER — NURSE ONLY (OUTPATIENT)
Dept: INTERNAL MEDICINE | Age: 50
End: 2020-01-18

## 2020-01-18 ENCOUNTER — LAB SERVICES (OUTPATIENT)
Dept: LAB | Age: 50
End: 2020-01-18

## 2020-01-18 VITALS — SYSTOLIC BLOOD PRESSURE: 164 MMHG | HEART RATE: 84 BPM | DIASTOLIC BLOOD PRESSURE: 90 MMHG | OXYGEN SATURATION: 97 %

## 2020-01-18 DIAGNOSIS — R73.01 IMPAIRED FASTING GLUCOSE: ICD-10-CM

## 2020-01-18 DIAGNOSIS — I10 UNCONTROLLED HYPERTENSION: ICD-10-CM

## 2020-01-18 LAB
ALBUMIN SERPL BCG-MCNC: 4.2 G/DL (ref 3.6–5.1)
ALP SERPL-CCNC: 62 U/L (ref 45–130)
ALT SERPL W/O P-5'-P-CCNC: 14 U/L (ref 7–34)
AST SERPL-CCNC: 19 U/L (ref 9–37)
BILIRUB SERPL-MCNC: 0.7 MG/DL (ref 0–1)
BUN SERPL-MCNC: 10 MG/DL (ref 7–20)
CALCIUM SERPL-MCNC: 9.4 MG/DL (ref 8.6–10.6)
CHLORIDE SERPL-SCNC: 104 MMOL/L (ref 96–107)
CHOLEST SERPL-MCNC: 209 MG/DL (ref 140–200)
CREAT SERPL-MCNC: 0.9 MG/DL (ref 0.5–1.4)
EST. AVERAGE GLUCOSE BLD GHB EST-MCNC: 105 MG/DL (ref 0–154)
GFR SERPL CREATININE-BSD FRML MDRD: >60 ML/MIN/{1.73M2}
GFR SERPL CREATININE-BSD FRML MDRD: >60 ML/MIN/{1.73M2}
GLUCOSE P FAST SERPL-MCNC: 104 MG/DL (ref 60–100)
HBA1C MFR BLD: 5.3 % (ref 4.2–6)
HCO3 SERPL-SCNC: 25 MMOL/L (ref 22–32)
HDLC SERPL-MCNC: 88 MG/DL
LDLC SERPL CALC-MCNC: 109 MG/DL (ref 0–100)
POTASSIUM SERPL-SCNC: 4.4 MMOL/L (ref 3.5–5.3)
PROT SERPL-MCNC: 6.6 G/DL (ref 6.2–8.1)
SODIUM SERPL-SCNC: 137 MMOL/L (ref 136–146)
TRIGL SERPL-MCNC: 59 MG/DL (ref 0–200)

## 2020-01-18 PROCEDURE — 80053 COMPREHEN METABOLIC PANEL: CPT | Performed by: INTERNAL MEDICINE

## 2020-01-18 PROCEDURE — 80061 LIPID PANEL: CPT | Performed by: INTERNAL MEDICINE

## 2020-01-18 PROCEDURE — 36415 COLL VENOUS BLD VENIPUNCTURE: CPT | Performed by: INTERNAL MEDICINE

## 2020-01-18 PROCEDURE — 83036 HEMOGLOBIN GLYCOSYLATED A1C: CPT | Performed by: INTERNAL MEDICINE

## 2020-01-18 PROCEDURE — 99211 OFF/OP EST MAY X REQ PHY/QHP: CPT | Performed by: INTERNAL MEDICINE

## 2020-01-24 RX ORDER — LOSARTAN POTASSIUM 50 MG/1
100 TABLET ORAL DAILY
Qty: 60 TABLET | Refills: 0 | Status: SHIPPED | COMMUNITY
Start: 2020-01-24 | End: 2020-07-28 | Stop reason: ALTCHOICE

## 2020-01-30 ENCOUNTER — TELEPHONE (OUTPATIENT)
Dept: INTERNAL MEDICINE | Age: 50
End: 2020-01-30

## 2020-02-01 ENCOUNTER — OFFICE VISIT (OUTPATIENT)
Dept: ALLERGY | Age: 50
End: 2020-02-01

## 2020-02-01 VITALS
DIASTOLIC BLOOD PRESSURE: 82 MMHG | HEIGHT: 60 IN | WEIGHT: 182.8 LBS | HEART RATE: 80 BPM | BODY MASS INDEX: 35.89 KG/M2 | SYSTOLIC BLOOD PRESSURE: 138 MMHG | TEMPERATURE: 98.2 F

## 2020-02-01 DIAGNOSIS — J45.20 MILD INTERMITTENT ASTHMA WITHOUT COMPLICATION: ICD-10-CM

## 2020-02-01 DIAGNOSIS — J30.1 ALLERGIC RHINITIS DUE TO POLLEN, UNSPECIFIED SEASONALITY: ICD-10-CM

## 2020-02-01 DIAGNOSIS — J32.9 CHRONIC SINUSITIS, UNSPECIFIED LOCATION: Primary | ICD-10-CM

## 2020-02-01 PROCEDURE — 3079F DIAST BP 80-89 MM HG: CPT | Performed by: ALLERGY & IMMUNOLOGY

## 2020-02-01 PROCEDURE — 99214 OFFICE O/P EST MOD 30 MIN: CPT | Performed by: ALLERGY & IMMUNOLOGY

## 2020-02-01 PROCEDURE — 94010 BREATHING CAPACITY TEST: CPT | Performed by: ALLERGY & IMMUNOLOGY

## 2020-02-01 PROCEDURE — 3075F SYST BP GE 130 - 139MM HG: CPT | Performed by: ALLERGY & IMMUNOLOGY

## 2020-02-01 RX ORDER — FLUTICASONE PROPIONATE 50 MCG
2 SPRAY, SUSPENSION (ML) NASAL DAILY
COMMUNITY
End: 2021-05-10 | Stop reason: SDUPTHER

## 2020-02-01 RX ORDER — MONTELUKAST SODIUM 10 MG/1
10 TABLET ORAL NIGHTLY
Qty: 30 TABLET | Refills: 5 | Status: SHIPPED | OUTPATIENT
Start: 2020-02-01 | End: 2020-10-19 | Stop reason: SDUPTHER

## 2020-02-08 ENCOUNTER — E-ADVICE (OUTPATIENT)
Dept: ALLERGY | Age: 50
End: 2020-02-08

## 2020-02-08 ENCOUNTER — OFFICE VISIT (OUTPATIENT)
Dept: OTOLARYNGOLOGY | Age: 50
End: 2020-02-08
Attending: ALLERGY & IMMUNOLOGY

## 2020-02-08 ENCOUNTER — LAB SERVICES (OUTPATIENT)
Dept: LAB | Age: 50
End: 2020-02-08

## 2020-02-08 VITALS — WEIGHT: 182 LBS | BODY MASS INDEX: 35.54 KG/M2

## 2020-02-08 DIAGNOSIS — J32.9 CHRONIC SINUSITIS, UNSPECIFIED LOCATION: Primary | ICD-10-CM

## 2020-02-08 PROCEDURE — 31231 NASAL ENDOSCOPY DX: CPT | Performed by: OTOLARYNGOLOGY

## 2020-02-08 PROCEDURE — 99243 OFF/OP CNSLTJ NEW/EST LOW 30: CPT | Performed by: OTOLARYNGOLOGY

## 2020-02-08 PROCEDURE — 87070 CULTURE OTHR SPECIMN AEROBIC: CPT | Performed by: OTOLARYNGOLOGY

## 2020-02-11 LAB — FINAL REPORT: NORMAL

## 2020-03-27 ENCOUNTER — E-ADVICE (OUTPATIENT)
Dept: ALLERGY | Age: 50
End: 2020-03-27

## 2020-07-08 DIAGNOSIS — I10 UNCONTROLLED HYPERTENSION: ICD-10-CM

## 2020-07-14 DIAGNOSIS — I10 UNCONTROLLED HYPERTENSION: ICD-10-CM

## 2020-07-14 RX ORDER — LOSARTAN POTASSIUM 50 MG/1
100 TABLET ORAL DAILY
Qty: 60 TABLET | Refills: 0 | Status: CANCELLED | OUTPATIENT
Start: 2020-07-14

## 2020-07-15 RX ORDER — LOSARTAN POTASSIUM 50 MG/1
50 TABLET ORAL DAILY
Qty: 90 TABLET | Refills: 0 | Status: SHIPPED | OUTPATIENT
Start: 2020-07-15 | End: 2020-10-19 | Stop reason: SDUPTHER

## 2020-07-16 RX ORDER — LOSARTAN POTASSIUM 50 MG/1
TABLET ORAL
Qty: 90 TABLET | OUTPATIENT
Start: 2020-07-16

## 2020-07-28 ENCOUNTER — OFFICE VISIT (OUTPATIENT)
Dept: INTERNAL MEDICINE | Age: 50
End: 2020-07-28

## 2020-07-28 VITALS
OXYGEN SATURATION: 98 % | RESPIRATION RATE: 16 BRPM | WEIGHT: 178 LBS | HEART RATE: 82 BPM | SYSTOLIC BLOOD PRESSURE: 126 MMHG | DIASTOLIC BLOOD PRESSURE: 84 MMHG | HEIGHT: 60 IN | TEMPERATURE: 98.6 F | BODY MASS INDEX: 34.95 KG/M2

## 2020-07-28 DIAGNOSIS — Z12.31 ENCOUNTER FOR SCREENING MAMMOGRAM FOR MALIGNANT NEOPLASM OF BREAST: Primary | ICD-10-CM

## 2020-07-28 DIAGNOSIS — F41.1 ANXIETY STATE: ICD-10-CM

## 2020-07-28 DIAGNOSIS — I10 ESSENTIAL HYPERTENSION: ICD-10-CM

## 2020-07-28 PROCEDURE — 3079F DIAST BP 80-89 MM HG: CPT | Performed by: INTERNAL MEDICINE

## 2020-07-28 PROCEDURE — 3074F SYST BP LT 130 MM HG: CPT | Performed by: INTERNAL MEDICINE

## 2020-07-28 PROCEDURE — 99213 OFFICE O/P EST LOW 20 MIN: CPT | Performed by: INTERNAL MEDICINE

## 2020-07-28 RX ORDER — ALPRAZOLAM 0.25 MG/1
0.25 TABLET ORAL NIGHTLY PRN
Qty: 30 TABLET | Refills: 0 | Status: SHIPPED | OUTPATIENT
Start: 2020-07-28 | End: 2021-05-10 | Stop reason: SDUPTHER

## 2020-07-28 SDOH — HEALTH STABILITY: PHYSICAL HEALTH: ON AVERAGE, HOW MANY DAYS PER WEEK DO YOU ENGAGE IN MODERATE TO STRENUOUS EXERCISE (LIKE A BRISK WALK)?: 7 DAYS

## 2020-07-28 SDOH — HEALTH STABILITY: PHYSICAL HEALTH: ON AVERAGE, HOW MANY MINUTES DO YOU ENGAGE IN EXERCISE AT THIS LEVEL?: 30 MIN

## 2020-07-28 ASSESSMENT — PATIENT HEALTH QUESTIONNAIRE - PHQ9
CLINICAL INTERPRETATION OF PHQ2 SCORE: NO FURTHER SCREENING NEEDED
SUM OF ALL RESPONSES TO PHQ9 QUESTIONS 1 AND 2: 0
2. FEELING DOWN, DEPRESSED OR HOPELESS: NOT AT ALL
1. LITTLE INTEREST OR PLEASURE IN DOING THINGS: NOT AT ALL
SUM OF ALL RESPONSES TO PHQ9 QUESTIONS 1 AND 2: 0
CLINICAL INTERPRETATION OF PHQ9 SCORE: NO FURTHER SCREENING NEEDED

## 2020-10-19 DIAGNOSIS — I10 UNCONTROLLED HYPERTENSION: ICD-10-CM

## 2020-10-19 RX ORDER — MONTELUKAST SODIUM 10 MG/1
10 TABLET ORAL NIGHTLY
Qty: 30 TABLET | Refills: 5 | Status: SHIPPED | OUTPATIENT
Start: 2020-10-19 | End: 2021-05-18

## 2020-10-21 RX ORDER — LOSARTAN POTASSIUM 50 MG/1
50 TABLET ORAL DAILY
Qty: 90 TABLET | Refills: 1 | Status: SHIPPED | OUTPATIENT
Start: 2020-10-21 | End: 2021-04-22 | Stop reason: SDUPTHER

## 2020-12-07 ENCOUNTER — OFFICE VISIT (OUTPATIENT)
Dept: OPHTHALMOLOGY | Age: 50
End: 2020-12-07

## 2020-12-07 DIAGNOSIS — H10.13 ALLERGIC CONJUNCTIVITIS OF BOTH EYES: ICD-10-CM

## 2020-12-07 DIAGNOSIS — H10.401 CHRONIC BACTERIAL CONJUNCTIVITIS OF RIGHT EYE: Primary | ICD-10-CM

## 2020-12-07 PROCEDURE — 99203 OFFICE O/P NEW LOW 30 MIN: CPT | Performed by: OPHTHALMOLOGY

## 2020-12-07 RX ORDER — OLOPATADINE HYDROCHLORIDE 1 MG/ML
1 SOLUTION/ DROPS OPHTHALMIC 2 TIMES DAILY
Qty: 5 ML | Refills: 12 | Status: SHIPPED | OUTPATIENT
Start: 2020-12-07

## 2020-12-07 RX ORDER — AZITHROMYCIN 500 MG/1
1000 TABLET, FILM COATED ORAL DAILY
Qty: 2 TABLET | Refills: 0 | Status: SHIPPED | OUTPATIENT
Start: 2020-12-07 | End: 2020-12-08

## 2020-12-07 ASSESSMENT — REFRACTION_WEARINGRX
OS_ADD: +2.50
OD_ADD: +2.50
OS_AXIS: 025
OD_CYLINDER: SPHERE
OS_CYLINDER: +0.50
OS_SPHERE: +1.00
OD_SPHERE: +0.75

## 2020-12-07 ASSESSMENT — VISUAL ACUITY
OD_CC: 20/25
OD_CC: 20/30
OS_CC: 20/20
METHOD: SNELLEN - LINEAR
OS_CC: 20/25
OD_CC+: -1

## 2020-12-07 ASSESSMENT — CONF VISUAL FIELD
OD_NORMAL: 1
OS_NORMAL: 1
METHOD: COUNTING FINGERS

## 2020-12-07 ASSESSMENT — SLIT LAMP EXAM - LIDS: COMMENTS: NORMAL

## 2020-12-07 ASSESSMENT — TONOMETRY
OS_IOP_MMHG: 21
IOP_METHOD: APPLANATION
OD_IOP_MMHG: 21

## 2020-12-07 ASSESSMENT — EXTERNAL EXAM - RIGHT EYE: OD_EXAM: NORMAL

## 2020-12-07 ASSESSMENT — EXTERNAL EXAM - LEFT EYE: OS_EXAM: NORMAL

## 2021-01-07 ENCOUNTER — OFFICE VISIT (OUTPATIENT)
Dept: OPHTHALMOLOGY | Age: 51
End: 2021-01-07

## 2021-01-07 DIAGNOSIS — H10.401 CHRONIC BACTERIAL CONJUNCTIVITIS OF RIGHT EYE: Primary | ICD-10-CM

## 2021-01-07 DIAGNOSIS — H10.13 ALLERGIC CONJUNCTIVITIS OF BOTH EYES: ICD-10-CM

## 2021-01-07 PROCEDURE — 92012 INTRM OPH EXAM EST PATIENT: CPT | Performed by: OPHTHALMOLOGY

## 2021-01-07 ASSESSMENT — CONF VISUAL FIELD
METHOD: COUNTING FINGERS
OD_NORMAL: 1
OS_NORMAL: 1

## 2021-01-07 ASSESSMENT — VISUAL ACUITY
OD_CC: 20/20
METHOD: SNELLEN- LINEAR
OS_CC: 20/20
OD_CC+: -1
OD_CC: 20/20
OS_CC: 20/20

## 2021-04-15 DIAGNOSIS — I10 UNCONTROLLED HYPERTENSION: ICD-10-CM

## 2021-04-18 RX ORDER — LOSARTAN POTASSIUM 50 MG/1
50 TABLET ORAL DAILY
Qty: 90 TABLET | Refills: 1 | OUTPATIENT
Start: 2021-04-18

## 2021-04-22 DIAGNOSIS — I10 UNCONTROLLED HYPERTENSION: ICD-10-CM

## 2021-04-22 RX ORDER — LOSARTAN POTASSIUM 50 MG/1
50 TABLET ORAL DAILY
Qty: 30 TABLET | Refills: 0 | Status: SHIPPED | OUTPATIENT
Start: 2021-04-22 | End: 2021-05-10 | Stop reason: SDUPTHER

## 2021-04-22 RX ORDER — LOSARTAN POTASSIUM 50 MG/1
50 TABLET ORAL DAILY
Qty: 90 TABLET | Refills: 1 | Status: CANCELLED | OUTPATIENT
Start: 2021-04-22

## 2021-05-10 ENCOUNTER — OFFICE VISIT (OUTPATIENT)
Dept: INTERNAL MEDICINE | Age: 51
End: 2021-05-10

## 2021-05-10 VITALS
DIASTOLIC BLOOD PRESSURE: 88 MMHG | TEMPERATURE: 97.2 F | BODY MASS INDEX: 36.89 KG/M2 | HEIGHT: 59 IN | WEIGHT: 183 LBS | SYSTOLIC BLOOD PRESSURE: 132 MMHG | RESPIRATION RATE: 18 BRPM | HEART RATE: 70 BPM | OXYGEN SATURATION: 98 %

## 2021-05-10 DIAGNOSIS — Z00.00 ENCOUNTER FOR GENERAL ADULT MEDICAL EXAMINATION WITHOUT ABNORMAL FINDINGS: ICD-10-CM

## 2021-05-10 DIAGNOSIS — Z11.51 SCREENING FOR HPV (HUMAN PAPILLOMAVIRUS): ICD-10-CM

## 2021-05-10 DIAGNOSIS — F41.1 ANXIETY STATE: ICD-10-CM

## 2021-05-10 DIAGNOSIS — Z11.51 ENCOUNTER FOR SCREENING FOR HUMAN PAPILLOMAVIRUS (HPV): ICD-10-CM

## 2021-05-10 DIAGNOSIS — Z01.419 ROUTINE GYNECOLOGICAL EXAMINATION: ICD-10-CM

## 2021-05-10 DIAGNOSIS — Z01.419 ENCOUNTER FOR GYNECOLOGICAL EXAMINATION (GENERAL) (ROUTINE) WITHOUT ABNORMAL FINDINGS: ICD-10-CM

## 2021-05-10 DIAGNOSIS — Z00.00 ROUTINE GENERAL MEDICAL EXAMINATION AT HEALTH CARE FACILITY: Primary | ICD-10-CM

## 2021-05-10 DIAGNOSIS — I10 ESSENTIAL HYPERTENSION: ICD-10-CM

## 2021-05-10 DIAGNOSIS — J30.9 ALLERGIC RHINITIS, UNSPECIFIED SEASONALITY, UNSPECIFIED TRIGGER: ICD-10-CM

## 2021-05-10 PROCEDURE — 99213 OFFICE O/P EST LOW 20 MIN: CPT | Performed by: INTERNAL MEDICINE

## 2021-05-10 PROCEDURE — 88141 CYTOPATH C/V INTERPRET: CPT | Performed by: PATHOLOGY

## 2021-05-10 PROCEDURE — 87625 HPV TYPES 16 & 18 ONLY: CPT | Performed by: CLINICAL MEDICAL LABORATORY

## 2021-05-10 PROCEDURE — 88142 CYTOPATH C/V THIN LAYER: CPT | Performed by: PATHOLOGY

## 2021-05-10 PROCEDURE — 3075F SYST BP GE 130 - 139MM HG: CPT | Performed by: INTERNAL MEDICINE

## 2021-05-10 PROCEDURE — 87624 HPV HI-RISK TYP POOLED RSLT: CPT | Performed by: CLINICAL MEDICAL LABORATORY

## 2021-05-10 PROCEDURE — 99396 PREV VISIT EST AGE 40-64: CPT | Performed by: INTERNAL MEDICINE

## 2021-05-10 PROCEDURE — 3079F DIAST BP 80-89 MM HG: CPT | Performed by: INTERNAL MEDICINE

## 2021-05-10 RX ORDER — LOSARTAN POTASSIUM 50 MG/1
50 TABLET ORAL DAILY
Qty: 90 TABLET | Refills: 1 | Status: SHIPPED | OUTPATIENT
Start: 2021-05-10 | End: 2021-07-14 | Stop reason: SDUPTHER

## 2021-05-10 RX ORDER — ALPRAZOLAM 0.25 MG/1
0.25 TABLET ORAL NIGHTLY PRN
Qty: 30 TABLET | Refills: 0 | Status: SHIPPED | OUTPATIENT
Start: 2021-05-10 | End: 2022-02-05 | Stop reason: SDUPTHER

## 2021-05-10 RX ORDER — FLUTICASONE PROPIONATE 50 MCG
2 SPRAY, SUSPENSION (ML) NASAL DAILY
Qty: 16 G | Refills: 3 | Status: SHIPPED | OUTPATIENT
Start: 2021-05-10

## 2021-05-10 ASSESSMENT — PATIENT HEALTH QUESTIONNAIRE - PHQ9
SUM OF ALL RESPONSES TO PHQ9 QUESTIONS 1 AND 2: 0
CLINICAL INTERPRETATION OF PHQ2 SCORE: NO FURTHER SCREENING NEEDED
SUM OF ALL RESPONSES TO PHQ9 QUESTIONS 1 AND 2: 0
CLINICAL INTERPRETATION OF PHQ9 SCORE: NO FURTHER SCREENING NEEDED
1. LITTLE INTEREST OR PLEASURE IN DOING THINGS: NOT AT ALL
2. FEELING DOWN, DEPRESSED OR HOPELESS: NOT AT ALL

## 2021-05-11 ENCOUNTER — IMAGING SERVICES (OUTPATIENT)
Dept: MAMMOGRAPHY | Age: 51
End: 2021-05-11
Attending: INTERNAL MEDICINE

## 2021-05-11 DIAGNOSIS — Z12.31 ENCOUNTER FOR SCREENING MAMMOGRAM FOR MALIGNANT NEOPLASM OF BREAST: ICD-10-CM

## 2021-05-11 PROCEDURE — 77067 SCR MAMMO BI INCL CAD: CPT | Performed by: RADIOLOGY

## 2021-05-11 PROCEDURE — 77063 BREAST TOMOSYNTHESIS BI: CPT | Performed by: RADIOLOGY

## 2021-05-13 ENCOUNTER — OFFICE VISIT (OUTPATIENT)
Dept: DERMATOLOGY | Age: 51
End: 2021-05-13

## 2021-05-13 DIAGNOSIS — L71.9 ROSACEA: Primary | ICD-10-CM

## 2021-05-13 PROCEDURE — 99213 OFFICE O/P EST LOW 20 MIN: CPT | Performed by: DERMATOLOGY

## 2021-05-17 LAB — AP REPORT: NORMAL

## 2021-05-18 ENCOUNTER — LAB REQUISITION (OUTPATIENT)
Dept: LAB | Age: 51
End: 2021-05-18

## 2021-05-18 DIAGNOSIS — Z00.00 ENCOUNTER FOR GENERAL ADULT MEDICAL EXAMINATION WITHOUT ABNORMAL FINDINGS: ICD-10-CM

## 2021-05-18 DIAGNOSIS — Z01.419 ENCOUNTER FOR GYNECOLOGICAL EXAMINATION (GENERAL) (ROUTINE) WITHOUT ABNORMAL FINDINGS: ICD-10-CM

## 2021-05-18 DIAGNOSIS — Z11.51 ENCOUNTER FOR SCREENING FOR HUMAN PAPILLOMAVIRUS (HPV): ICD-10-CM

## 2021-05-18 RX ORDER — MONTELUKAST SODIUM 10 MG/1
TABLET ORAL
Qty: 30 TABLET | Refills: 1 | Status: SHIPPED | OUTPATIENT
Start: 2021-05-18 | End: 2021-06-16

## 2021-05-20 ENCOUNTER — IMAGING SERVICES (OUTPATIENT)
Dept: ULTRASOUND IMAGING | Age: 51
End: 2021-05-20
Attending: INTERNAL MEDICINE

## 2021-05-20 ENCOUNTER — TELEPHONE (OUTPATIENT)
Dept: CT IMAGING | Age: 51
End: 2021-05-20

## 2021-05-20 ENCOUNTER — LAB SERVICES (OUTPATIENT)
Dept: LAB | Age: 51
End: 2021-05-20

## 2021-05-20 ENCOUNTER — IMAGING SERVICES (OUTPATIENT)
Dept: MAMMOGRAPHY | Age: 51
End: 2021-05-20
Attending: INTERNAL MEDICINE

## 2021-05-20 DIAGNOSIS — N63.0 LUMP OR MASS IN BREAST: Primary | ICD-10-CM

## 2021-05-20 DIAGNOSIS — R87.810 CERVICAL HIGH RISK HPV (HUMAN PAPILLOMAVIRUS) TEST POSITIVE: Primary | ICD-10-CM

## 2021-05-20 DIAGNOSIS — R92.8 ABNORMAL MAMMOGRAM: ICD-10-CM

## 2021-05-20 DIAGNOSIS — Z00.00 ROUTINE GENERAL MEDICAL EXAMINATION AT HEALTH CARE FACILITY: ICD-10-CM

## 2021-05-20 DIAGNOSIS — R92.8 ABNORMAL MAMMOGRAM: Primary | ICD-10-CM

## 2021-05-20 DIAGNOSIS — N63.0 LUMP OR MASS IN BREAST: ICD-10-CM

## 2021-05-20 LAB
ALBUMIN SERPL-MCNC: 4.3 G/DL (ref 3.6–5.1)
ALP SERPL-CCNC: 79 U/L (ref 45–130)
ALT SERPL W/O P-5'-P-CCNC: 22 U/L (ref 4–38)
AST SERPL-CCNC: 29 U/L (ref 14–43)
BASOPHIL %: 0.6 % (ref 0–1.2)
BASOPHIL ABSOLUTE #: 0 10*3/UL (ref 0–0.1)
BILIRUB SERPL-MCNC: 1.1 MG/DL (ref 0–1.3)
BUN SERPL-MCNC: 13 MG/DL (ref 7–20)
CALCIUM SERPL-MCNC: 9.7 MG/DL (ref 8.6–10.6)
CHLORIDE SERPL-SCNC: 102 MMOL/L (ref 96–107)
CHOLEST SERPL-MCNC: 208 MG/DL (ref 140–200)
CO2 SERPL-SCNC: 27 MMOL/L (ref 22–32)
CREAT SERPL-MCNC: 0.9 MG/DL (ref 0.5–1.4)
DIFFERENTIAL TYPE: NORMAL
EOSINOPHIL %: 3.8 % (ref 0–10)
EOSINOPHIL ABSOLUTE #: 0.3 10*3/UL (ref 0–0.5)
GFR SERPL CREATININE-BSD FRML MDRD: >60 ML/MIN/{1.73M2}
GFR SERPL CREATININE-BSD FRML MDRD: >60 ML/MIN/{1.73M2}
GLUCOSE P FAST SERPL-MCNC: 94 MG/DL (ref 60–100)
HDLC SERPL-MCNC: 109 MG/DL
HEMATOCRIT: 40.5 % (ref 34–45)
HEMOGLOBIN: 13.6 G/DL (ref 11.2–15.7)
HPV GENOTYPE 16: NEGATIVE
HPV GENOTYPE 18/45: NEGATIVE
HPV16+18+45 E6+E7MRNA CVX NAA+PROBE: POSITIVE
HPV16+18+45 E6+E7MRNA CVX NAA+PROBE: POSITIVE
IMMATURE GRANULOCYTE ABSOLUTE: 0.01 10*3/UL (ref 0–0.05)
IMMATURE GRANULOCYTE PERCENT: 0.2 % (ref 0–0.5)
LDLC SERPL CALC-MCNC: 87 MG/DL (ref 30–100)
LYMPH PERCENT: 32.3 % (ref 20.5–51.1)
LYMPHOCYTE ABSOLUTE #: 2.1 10*3/UL (ref 1.2–3.4)
Lab: ABNORMAL
Lab: ABNORMAL
Lab: NORMAL
MEAN CORPUSCULAR HGB CONCENTRATION: 33.6 % (ref 32–36)
MEAN CORPUSCULAR HGB: 31.3 PG (ref 27–34)
MEAN CORPUSCULAR VOLUME: 93.3 FL (ref 79–95)
MEAN PLATELET VOLUME: 11.3 FL (ref 8.6–12.4)
MONOCYTE ABSOLUTE #: 0.4 10*3/UL (ref 0.2–0.9)
MONOCYTE PERCENT: 6.4 % (ref 4.3–12.9)
NEUTROPHIL ABSOLUTE #: 3.7 10*3/UL (ref 1.4–6.5)
NEUTROPHIL PERCENT: 56.7 % (ref 34–73.5)
PLATELET COUNT: 254 10*3/UL (ref 150–400)
POTASSIUM SERPL-SCNC: 4.7 MMOL/L (ref 3.5–5.3)
PROT SERPL-MCNC: 7.3 G/DL (ref 6.4–8.5)
RED BLOOD CELL COUNT: 4.34 10*6/UL (ref 3.7–5.2)
RED CELL DISTRIBUTION WIDTH: 12.2 % (ref 11.3–14.8)
SODIUM SERPL-SCNC: 137 MMOL/L (ref 136–146)
TRIGL SERPL-MCNC: 60 MG/DL (ref 0–200)
TSH SERPL DL<=0.05 MIU/L-ACNC: 0.94 M[IU]/L (ref 0.3–4.82)
WHITE BLOOD CELL COUNT: 6.6 10*3/UL (ref 4–10)

## 2021-05-20 PROCEDURE — 80061 LIPID PANEL: CPT | Performed by: INTERNAL MEDICINE

## 2021-05-20 PROCEDURE — 77065 DX MAMMO INCL CAD UNI: CPT | Performed by: RADIOLOGY

## 2021-05-20 PROCEDURE — 80050 GENERAL HEALTH PANEL: CPT | Performed by: INTERNAL MEDICINE

## 2021-05-20 PROCEDURE — 36415 COLL VENOUS BLD VENIPUNCTURE: CPT | Performed by: INTERNAL MEDICINE

## 2021-05-20 PROCEDURE — 76642 ULTRASOUND BREAST LIMITED: CPT | Performed by: RADIOLOGY

## 2021-05-20 PROCEDURE — 77061 BREAST TOMOSYNTHESIS UNI: CPT | Performed by: RADIOLOGY

## 2021-05-21 LAB
HPV GENOTYPE 16: NEGATIVE
HPV GENOTYPE 18/45: NEGATIVE
Lab: NORMAL

## 2021-05-25 ENCOUNTER — IMAGING SERVICES (OUTPATIENT)
Dept: MAMMOGRAPHY | Age: 51
End: 2021-05-25
Attending: RADIOLOGY

## 2021-05-25 ENCOUNTER — IMAGING SERVICES (OUTPATIENT)
Dept: ULTRASOUND IMAGING | Age: 51
End: 2021-05-25
Attending: RADIOLOGY

## 2021-05-25 VITALS
TEMPERATURE: 100.2 F | SYSTOLIC BLOOD PRESSURE: 140 MMHG | RESPIRATION RATE: 20 BRPM | RESPIRATION RATE: 18 BRPM | DIASTOLIC BLOOD PRESSURE: 99 MMHG | TEMPERATURE: 98.9 F | SYSTOLIC BLOOD PRESSURE: 147 MMHG | HEART RATE: 71 BPM | RESPIRATION RATE: 18 BRPM | RESPIRATION RATE: 16 BRPM | SYSTOLIC BLOOD PRESSURE: 145 MMHG | HEART RATE: 83 BPM | TEMPERATURE: 99.4 F | HEART RATE: 90 BPM | DIASTOLIC BLOOD PRESSURE: 94 MMHG | DIASTOLIC BLOOD PRESSURE: 94 MMHG | HEART RATE: 76 BPM | SYSTOLIC BLOOD PRESSURE: 129 MMHG | OXYGEN SATURATION: 97 % | OXYGEN SATURATION: 96 % | OXYGEN SATURATION: 97 % | DIASTOLIC BLOOD PRESSURE: 92 MMHG | TEMPERATURE: 98 F

## 2021-05-25 DIAGNOSIS — N63.0 LUMP OR MASS IN BREAST: ICD-10-CM

## 2021-05-25 PROCEDURE — 88377 M/PHMTRC ALYS ISHQUANT/SEMIQ: CPT | Performed by: CLINICAL MEDICAL LABORATORY

## 2021-05-25 PROCEDURE — 88342 IMHCHEM/IMCYTCHM 1ST ANTB: CPT | Performed by: PATHOLOGY

## 2021-05-25 PROCEDURE — 19083 BX BREAST 1ST LESION US IMAG: CPT | Performed by: RADIOLOGY

## 2021-05-25 PROCEDURE — 88305 TISSUE EXAM BY PATHOLOGIST: CPT | Performed by: PATHOLOGY

## 2021-05-25 PROCEDURE — 88341 IMHCHEM/IMCYTCHM EA ADD ANTB: CPT | Performed by: PATHOLOGY

## 2021-05-25 PROCEDURE — 77065 DX MAMMO INCL CAD UNI: CPT | Performed by: RADIOLOGY

## 2021-05-25 RX ORDER — LIDOCAINE HYDROCHLORIDE 10 MG/ML
10 INJECTION, SOLUTION INFILTRATION; PERINEURAL ONCE
Status: SHIPPED | OUTPATIENT
Start: 2021-05-25

## 2021-05-26 ENCOUNTER — LAB REQUISITION (OUTPATIENT)
Dept: LAB | Age: 51
End: 2021-05-26

## 2021-05-26 DIAGNOSIS — N63.0 UNSPECIFIED LUMP IN UNSPECIFIED BREAST: ICD-10-CM

## 2021-05-26 PROCEDURE — 88305 TISSUE EXAM BY PATHOLOGIST: CPT | Performed by: CLINICAL MEDICAL LABORATORY

## 2021-05-26 PROCEDURE — 88342 IMHCHEM/IMCYTCHM 1ST ANTB: CPT | Performed by: CLINICAL MEDICAL LABORATORY

## 2021-05-26 PROCEDURE — 88341 IMHCHEM/IMCYTCHM EA ADD ANTB: CPT | Performed by: CLINICAL MEDICAL LABORATORY

## 2021-05-27 LAB
CASE RPRT: NORMAL
PATH REPORT.FINAL DX SPEC: NORMAL

## 2021-05-28 ENCOUNTER — TELEPHONE (OUTPATIENT)
Dept: INTERNAL MEDICINE | Age: 51
End: 2021-05-28

## 2021-05-28 ENCOUNTER — LAB REQUISITION (OUTPATIENT)
Dept: LAB | Age: 51
End: 2021-05-28

## 2021-05-28 DIAGNOSIS — N63.0 UNSPECIFIED LUMP IN UNSPECIFIED BREAST: ICD-10-CM

## 2021-05-28 PROCEDURE — 88305 TISSUE EXAM BY PATHOLOGIST: CPT | Performed by: CLINICAL MEDICAL LABORATORY

## 2021-05-28 PROCEDURE — 88360 TUMOR IMMUNOHISTOCHEM/MANUAL: CPT | Performed by: CLINICAL MEDICAL LABORATORY

## 2021-06-02 LAB
ASR DISCLAIMER: NORMAL
CASE RPRT: NORMAL
CLINICAL INFO: NORMAL
PATH REPORT.FINAL DX SPEC: NORMAL

## 2021-06-07 ENCOUNTER — IMAGING SERVICES (OUTPATIENT)
Dept: GENERAL RADIOLOGY | Age: 51
End: 2021-06-07
Attending: SURGERY

## 2021-06-07 ENCOUNTER — OFFICE VISIT (OUTPATIENT)
Dept: SURGERY | Age: 51
End: 2021-06-07

## 2021-06-07 ENCOUNTER — LAB REQUISITION (OUTPATIENT)
Dept: LAB | Age: 51
End: 2021-06-07

## 2021-06-07 ENCOUNTER — OFFICE VISIT (OUTPATIENT)
Dept: CARDIOLOGY | Age: 51
End: 2021-06-07
Attending: SURGERY

## 2021-06-07 ENCOUNTER — LAB SERVICES (OUTPATIENT)
Dept: LAB | Age: 51
End: 2021-06-07

## 2021-06-07 VITALS — BODY MASS INDEX: 35.14 KG/M2 | WEIGHT: 179 LBS | HEIGHT: 60 IN

## 2021-06-07 DIAGNOSIS — C50.912 INFILTRATING DUCTAL CARCINOMA OF LEFT BREAST (CMD): ICD-10-CM

## 2021-06-07 DIAGNOSIS — C50.912 MALIGNANT NEOPLASM OF UNSPECIFIED SITE OF LEFT FEMALE BREAST (CMD): ICD-10-CM

## 2021-06-07 DIAGNOSIS — M89.8X9 BONE PAIN: ICD-10-CM

## 2021-06-07 DIAGNOSIS — C50.912 INFILTRATING DUCTAL CARCINOMA OF LEFT BREAST (CMD): Primary | ICD-10-CM

## 2021-06-07 LAB
ALBUMIN SERPL BCG-MCNC: 4.5 G/DL (ref 3.6–5.1)
ALP SERPL-CCNC: 76 U/L (ref 45–130)
ALT SERPL W/O P-5'-P-CCNC: 16 U/L (ref 7–34)
AST SERPL-CCNC: 22 U/L (ref 9–37)
BASOPHIL %: 0.5 % (ref 0–1.2)
BASOPHIL ABSOLUTE #: 0 10*3/UL (ref 0–0.1)
BILIRUB SERPL-MCNC: 1.1 MG/DL (ref 0–1)
BILIRUBIN URINE: NEGATIVE
BLOOD URINE: NEGATIVE
BUN SERPL-MCNC: 15 MG/DL (ref 7–20)
CALCIUM SERPL-MCNC: 9.4 MG/DL (ref 8.6–10.6)
CASE OR PARAFFIN BLOCK NUMBER: NORMAL
CELLS COUNTED: NORMAL
CHLORIDE SERPL-SCNC: 102 MMOL/L (ref 96–107)
CLARITY: CLEAR
COLD ISCHEMIA AND FIXATION TIMES: NORMAL
COLOR: YELLOW
CREAT SERPL-MCNC: 0.9 MG/DL (ref 0.5–1.4)
DIFFERENTIAL TYPE: NORMAL
EOSINOPHIL %: 4.5 % (ref 0–10)
EOSINOPHIL ABSOLUTE #: 0.3 10*3/UL (ref 0–0.5)
GENE ANALYSIS NARR RPT DOC: NORMAL
GFR SERPL CREATININE-BSD FRML MDRD: >60 ML/MIN/{1.73M2}
GFR SERPL CREATININE-BSD FRML MDRD: >60 ML/MIN/{1.73M2}
GLUCOSE QUALITATIVE U: NEGATIVE
GLUCOSE SERPL-MCNC: 99 MG/DL (ref 70–200)
HCO3 SERPL-SCNC: 27 MMOL/L (ref 22–32)
HEMATOCRIT: 40 % (ref 34–45)
HEMOGLOBIN: 13.2 G/DL (ref 11.2–15.7)
IMMATURE GRANULOCYTE ABSOLUTE: 0.01 10*3/UL (ref 0–0.05)
IMMATURE GRANULOCYTE PERCENT: 0.2 % (ref 0–0.5)
INTERNATIONAL NORMALIZED RATIO: 1.1
INTERPRETATIVE RANGES: NORMAL
KETONES, URINE: NEGATIVE
LAB TEST METHOD: NORMAL
LEUKOCYTE ESTERASE URINE: NEGATIVE
LYMPH PERCENT: 29.1 % (ref 20.5–51.1)
LYMPHOCYTE ABSOLUTE #: 1.7 10*3/UL (ref 1.2–3.4)
MEAN CORPUSCULAR HGB CONCENTRATION: 33 % (ref 32–36)
MEAN CORPUSCULAR HGB: 31.1 PG (ref 27–34)
MEAN CORPUSCULAR VOLUME: 94.1 FL (ref 79–95)
MEAN PLATELET VOLUME: 10.7 FL (ref 8.6–12.4)
MONOCYTE ABSOLUTE #: 0.5 10*3/UL (ref 0.2–0.9)
MONOCYTE PERCENT: 7.9 % (ref 4.3–12.9)
NEUTROPHIL ABSOLUTE #: 3.4 10*3/UL (ref 1.4–6.5)
NEUTROPHIL PERCENT: 57.8 % (ref 34–73.5)
NITRITE URINE: NEGATIVE
PH URINE: 6 (ref 5–7)
PLATELET COUNT: 246 10*3/UL (ref 150–400)
POTASSIUM SERPL-SCNC: 4.8 MMOL/L (ref 3.5–5.3)
PROT SERPL-MCNC: 7.1 G/DL (ref 6.2–8.1)
PROTHROMBIN TIME, THERAPEUTIC: 11.8 S (ref 9.8–12.1)
PTT: 25.5 S (ref 24–38)
RED BLOOD CELL COUNT: 4.25 10*6/UL (ref 3.7–5.2)
RED CELL DISTRIBUTION WIDTH: 12.4 % (ref 11.3–14.8)
SERVICE CMNT-IMP: NORMAL
SODIUM SERPL-SCNC: 137 MMOL/L (ref 136–146)
SPECIFIC GRAVITY URINE: 1.01 (ref 1–1.03)
SPECIMEN LABELED AS: NORMAL
URINE PROTEIN, QUAL (DIPSTICK): NEGATIVE
UROBILINOGEN URINE: <2
WHITE BLOOD CELL COUNT: 6 10*3/UL (ref 4–10)

## 2021-06-07 PROCEDURE — 85025 COMPLETE CBC W/AUTO DIFF WBC: CPT | Performed by: SURGERY

## 2021-06-07 PROCEDURE — PSEU8078 CA15-3 CANCER ANTIGEN: Performed by: CLINICAL MEDICAL LABORATORY

## 2021-06-07 PROCEDURE — 86300 IMMUNOASSAY TUMOR CA 15-3: CPT | Performed by: CLINICAL MEDICAL LABORATORY

## 2021-06-07 PROCEDURE — 81003 URINALYSIS AUTO W/O SCOPE: CPT | Performed by: SURGERY

## 2021-06-07 PROCEDURE — 99243 OFF/OP CNSLTJ NEW/EST LOW 30: CPT | Performed by: SURGERY

## 2021-06-07 PROCEDURE — 80053 COMPREHEN METABOLIC PANEL: CPT | Performed by: SURGERY

## 2021-06-07 PROCEDURE — 86300 IMMUNOASSAY TUMOR CA 15-3: CPT | Performed by: SURGERY

## 2021-06-07 PROCEDURE — 85730 THROMBOPLASTIN TIME PARTIAL: CPT | Performed by: SURGERY

## 2021-06-07 PROCEDURE — 85610 PROTHROMBIN TIME: CPT | Performed by: SURGERY

## 2021-06-07 PROCEDURE — PSEU8183 CA 27.29: Performed by: CLINICAL MEDICAL LABORATORY

## 2021-06-07 PROCEDURE — 93000 ELECTROCARDIOGRAM COMPLETE: CPT | Performed by: SURGERY

## 2021-06-07 PROCEDURE — 36415 COLL VENOUS BLD VENIPUNCTURE: CPT | Performed by: SURGERY

## 2021-06-07 PROCEDURE — 71046 X-RAY EXAM CHEST 2 VIEWS: CPT | Performed by: RADIOLOGY

## 2021-06-08 ENCOUNTER — EXTERNAL RECORD (OUTPATIENT)
Dept: HEALTH INFORMATION MANAGEMENT | Facility: OTHER | Age: 51
End: 2021-06-08

## 2021-06-08 ENCOUNTER — TELEPHONE (OUTPATIENT)
Dept: SURGERY | Age: 51
End: 2021-06-08

## 2021-06-08 LAB
BKR KIT TESTING DISCLAIMER STATEMENT: NORMAL
CANCER AG15-3 SERPL-ACNC: 7 UNITS/ML (ref 0–32)
CANCER AG15-3 SERPL-ACNC: 7 UNITS/ML (ref 0–32)
CANCER AG27-29 SERPL-ACNC: 11.5 UNITS/ML (ref 0–40)
CANCER AG27-29 SERPL-ACNC: 11.5 UNITS/ML (ref 0–40)

## 2021-06-09 ENCOUNTER — EXTERNAL RECORD (OUTPATIENT)
Dept: HEALTH INFORMATION MANAGEMENT | Facility: OTHER | Age: 51
End: 2021-06-09

## 2021-06-10 DIAGNOSIS — C50.912 INFILTRATING DUCTAL CARCINOMA OF LEFT BREAST (CMD): ICD-10-CM

## 2021-06-11 ENCOUNTER — IMAGING SERVICES (OUTPATIENT)
Dept: GENERAL RADIOLOGY | Age: 51
End: 2021-06-11
Attending: SURGERY

## 2021-06-11 DIAGNOSIS — C50.912 INFILTRATING DUCTAL CARCINOMA OF LEFT BREAST (CMD): ICD-10-CM

## 2021-06-11 DIAGNOSIS — M89.8X9 BONE PAIN: ICD-10-CM

## 2021-06-11 PROCEDURE — 78306 BONE IMAGING WHOLE BODY: CPT | Performed by: RADIOLOGY

## 2021-06-11 PROCEDURE — A9503 TC99M MEDRONATE: HCPCS

## 2021-06-11 RX ORDER — TC 99M MEDRONATE 20 MG/10ML
24.4 INJECTION, POWDER, LYOPHILIZED, FOR SOLUTION INTRAVENOUS ONCE
Status: COMPLETED | OUTPATIENT
Start: 2021-06-11 | End: 2021-06-11

## 2021-06-11 RX ADMIN — TC 99M MEDRONATE 24.4 MILLICURIE: 20 INJECTION, POWDER, LYOPHILIZED, FOR SOLUTION INTRAVENOUS at 08:55

## 2021-06-16 ENCOUNTER — TELEPHONE (OUTPATIENT)
Dept: ALLERGY | Age: 51
End: 2021-06-16

## 2021-06-16 DIAGNOSIS — J32.9 CHRONIC SINUSITIS, UNSPECIFIED LOCATION: Primary | ICD-10-CM

## 2021-06-16 DIAGNOSIS — J30.1 ALLERGIC RHINITIS DUE TO POLLEN, UNSPECIFIED SEASONALITY: ICD-10-CM

## 2021-06-16 LAB — SEND OUT RESULTS: NORMAL

## 2021-06-16 RX ORDER — MONTELUKAST SODIUM 10 MG/1
TABLET ORAL
Qty: 30 TABLET | Refills: 0 | Status: SHIPPED | OUTPATIENT
Start: 2021-06-16 | End: 2021-06-17 | Stop reason: ALTCHOICE

## 2021-06-17 ENCOUNTER — APPOINTMENT (OUTPATIENT)
Dept: DERMATOLOGY | Age: 51
End: 2021-06-17

## 2021-06-17 ENCOUNTER — TELEPHONE (OUTPATIENT)
Dept: SURGERY | Age: 51
End: 2021-06-17

## 2021-06-17 ENCOUNTER — TELEPHONE (OUTPATIENT)
Dept: DERMATOLOGY | Age: 51
End: 2021-06-17

## 2021-06-17 ENCOUNTER — V-VISIT (OUTPATIENT)
Dept: ALLERGY | Age: 51
End: 2021-06-17

## 2021-06-17 DIAGNOSIS — J30.9 ALLERGIC RHINOCONJUNCTIVITIS: ICD-10-CM

## 2021-06-17 DIAGNOSIS — J45.20 MILD INTERMITTENT ASTHMA WITHOUT COMPLICATION: Primary | ICD-10-CM

## 2021-06-17 DIAGNOSIS — J32.9 CHRONIC SINUSITIS, UNSPECIFIED LOCATION: ICD-10-CM

## 2021-06-17 DIAGNOSIS — H10.10 ALLERGIC RHINOCONJUNCTIVITIS: ICD-10-CM

## 2021-06-17 PROCEDURE — 99214 OFFICE O/P EST MOD 30 MIN: CPT | Performed by: NURSE PRACTITIONER

## 2021-06-17 RX ORDER — BECLOMETHASONE DIPROPIONATE HFA 80 UG/1
2 AEROSOL, METERED RESPIRATORY (INHALATION) 2 TIMES DAILY
Qty: 1 INHALER | Refills: 5 | Status: SHIPPED | OUTPATIENT
Start: 2021-06-17

## 2021-06-17 RX ORDER — ALBUTEROL SULFATE 90 UG/1
2 AEROSOL, METERED RESPIRATORY (INHALATION) EVERY 4 HOURS PRN
Qty: 1 EACH | Refills: 0 | Status: SHIPPED | OUTPATIENT
Start: 2021-06-17

## 2021-06-17 RX ORDER — MONTELUKAST SODIUM 10 MG/1
TABLET ORAL
Qty: 90 TABLET | OUTPATIENT
Start: 2021-06-17

## 2021-06-17 ASSESSMENT — ENCOUNTER SYMPTOMS
SINUS PAIN: 0
NERVOUS/ANXIOUS: 0
RHINORRHEA: 0
HEADACHES: 0
DIARRHEA: 0
SORE THROAT: 0
WHEEZING: 0
ABDOMINAL PAIN: 0
FEVER: 0
COUGH: 0
SLEEP DISTURBANCE: 0
FACIAL SWELLING: 0
APPETITE CHANGE: 0
SINUS PRESSURE: 0
VOMITING: 0
CHEST TIGHTNESS: 0
ADENOPATHY: 0

## 2021-06-18 DIAGNOSIS — C50.912 INFILTRATING DUCTAL CARCINOMA OF LEFT BREAST (CMD): Primary | ICD-10-CM

## 2021-06-21 DIAGNOSIS — C50.912 INFILTRATING DUCTAL CARCINOMA OF LEFT BREAST (CMD): ICD-10-CM

## 2021-06-22 ENCOUNTER — APPOINTMENT (OUTPATIENT)
Dept: ALLERGY | Age: 51
End: 2021-06-22

## 2021-06-23 ENCOUNTER — TELEPHONE (OUTPATIENT)
Dept: SURGERY | Age: 51
End: 2021-06-23

## 2021-06-23 ENCOUNTER — TELEPHONE (OUTPATIENT)
Dept: INTERNAL MEDICINE | Age: 51
End: 2021-06-23

## 2021-06-23 RX ORDER — LIDOCAINE AND PRILOCAINE 25; 25 MG/G; MG/G
CREAM TOPICAL
Qty: 30 G | Refills: 0 | Status: SHIPPED | OUTPATIENT
Start: 2021-06-23 | End: 2021-07-08 | Stop reason: ALTCHOICE

## 2021-06-24 ENCOUNTER — OFFICE VISIT (OUTPATIENT)
Dept: INTERNAL MEDICINE | Age: 51
End: 2021-06-24

## 2021-06-24 VITALS
DIASTOLIC BLOOD PRESSURE: 88 MMHG | SYSTOLIC BLOOD PRESSURE: 148 MMHG | TEMPERATURE: 98 F | HEART RATE: 80 BPM | BODY MASS INDEX: 35.34 KG/M2 | HEIGHT: 60 IN | RESPIRATION RATE: 14 BRPM | WEIGHT: 180 LBS

## 2021-06-24 DIAGNOSIS — C50.912 INFILTRATING DUCTAL CARCINOMA OF LEFT BREAST (CMD): ICD-10-CM

## 2021-06-24 DIAGNOSIS — Z01.818 PREOP EXAMINATION: Primary | ICD-10-CM

## 2021-06-24 PROCEDURE — 99243 OFF/OP CNSLTJ NEW/EST LOW 30: CPT | Performed by: INTERNAL MEDICINE

## 2021-06-24 ASSESSMENT — PAIN SCALES - GENERAL: PAINLEVEL: 0

## 2021-06-25 ENCOUNTER — OFFICE VISIT (OUTPATIENT)
Dept: SURGERY | Age: 51
End: 2021-06-25

## 2021-06-25 ENCOUNTER — TELEPHONE (OUTPATIENT)
Dept: SURGERY | Age: 51
End: 2021-06-25

## 2021-06-25 VITALS — HEIGHT: 60 IN | WEIGHT: 179 LBS | BODY MASS INDEX: 35.14 KG/M2

## 2021-06-25 DIAGNOSIS — R92.30 INCONCLUSIVE MAMMOGRAPHY DUE TO DENSE BREASTS: ICD-10-CM

## 2021-06-25 DIAGNOSIS — R92.2 INCONCLUSIVE MAMMOGRAPHY DUE TO DENSE BREASTS: ICD-10-CM

## 2021-06-25 DIAGNOSIS — C50.912 INFILTRATING DUCTAL CARCINOMA OF LEFT BREAST (CMD): Primary | ICD-10-CM

## 2021-06-25 PROCEDURE — 99214 OFFICE O/P EST MOD 30 MIN: CPT | Performed by: SURGERY

## 2021-06-27 PROBLEM — R92.2 INCONCLUSIVE MAMMOGRAPHY DUE TO DENSE BREASTS: Status: ACTIVE | Noted: 2021-06-27

## 2021-06-27 PROBLEM — R92.30 INCONCLUSIVE MAMMOGRAPHY DUE TO DENSE BREASTS: Status: ACTIVE | Noted: 2021-06-27

## 2021-06-28 ENCOUNTER — EXTERNAL RECORD (OUTPATIENT)
Dept: HEALTH INFORMATION MANAGEMENT | Facility: OTHER | Age: 51
End: 2021-06-28

## 2021-06-29 ENCOUNTER — APPOINTMENT (OUTPATIENT)
Dept: OTHER | Facility: PHYSICIAN GROUP | Age: 51
End: 2021-06-29
Attending: SURGERY

## 2021-06-29 PROCEDURE — 19302 P-MASTECTOMY W/LN REMOVAL: CPT | Performed by: SURGERY

## 2021-06-29 PROCEDURE — 38525 BIOPSY/REMOVAL LYMPH NODES: CPT | Performed by: SURGERY

## 2021-06-29 PROCEDURE — 38900 IO MAP OF SENT LYMPH NODE: CPT | Performed by: SURGERY

## 2021-06-29 PROCEDURE — 14000 TIS TRNFR TRUNK 10 SQ CM/<: CPT | Performed by: SURGERY

## 2021-07-01 DIAGNOSIS — C50.912 INFILTRATING DUCTAL CARCINOMA OF LEFT BREAST (CMD): ICD-10-CM

## 2021-07-02 ENCOUNTER — TELEPHONE (OUTPATIENT)
Dept: SURGERY | Age: 51
End: 2021-07-02

## 2021-07-08 ENCOUNTER — OFFICE VISIT (OUTPATIENT)
Dept: SURGERY | Age: 51
End: 2021-07-08

## 2021-07-08 VITALS — WEIGHT: 178 LBS | HEIGHT: 59 IN | BODY MASS INDEX: 35.88 KG/M2

## 2021-07-08 DIAGNOSIS — C50.912 INFILTRATING DUCTAL CARCINOMA OF LEFT BREAST (CMD): Primary | ICD-10-CM

## 2021-07-08 DIAGNOSIS — C50.912 CARCINOMA OF LEFT BREAST METASTATIC TO AXILLARY LYMPH NODE (CMD): ICD-10-CM

## 2021-07-08 DIAGNOSIS — C77.3 CARCINOMA OF LEFT BREAST METASTATIC TO AXILLARY LYMPH NODE (CMD): ICD-10-CM

## 2021-07-08 PROCEDURE — 99024 POSTOP FOLLOW-UP VISIT: CPT | Performed by: SURGERY

## 2021-07-09 ENCOUNTER — E-ADVICE (OUTPATIENT)
Dept: INTERNAL MEDICINE | Age: 51
End: 2021-07-09

## 2021-07-09 DIAGNOSIS — I10 ESSENTIAL HYPERTENSION: ICD-10-CM

## 2021-07-09 DIAGNOSIS — C50.912 INFILTRATING DUCTAL CARCINOMA OF LEFT BREAST (CMD): Primary | ICD-10-CM

## 2021-07-10 RX ORDER — LOSARTAN POTASSIUM 50 MG/1
75 TABLET ORAL DAILY
Qty: 135 TABLET | Refills: 1 | Status: CANCELLED | OUTPATIENT
Start: 2021-07-10

## 2021-07-11 PROBLEM — C50.912 CARCINOMA OF LEFT BREAST METASTATIC TO AXILLARY LYMPH NODE (CMD): Status: ACTIVE | Noted: 2021-07-11

## 2021-07-11 PROBLEM — C77.3 CARCINOMA OF LEFT BREAST METASTATIC TO AXILLARY LYMPH NODE (CMD): Status: ACTIVE | Noted: 2021-07-11

## 2021-07-14 DIAGNOSIS — I10 ESSENTIAL HYPERTENSION: ICD-10-CM

## 2021-07-14 RX ORDER — LOSARTAN POTASSIUM 50 MG/1
TABLET ORAL
Qty: 135 TABLET | OUTPATIENT
Start: 2021-07-14

## 2021-07-14 RX ORDER — LOSARTAN POTASSIUM 50 MG/1
75 TABLET ORAL DAILY
Qty: 45 TABLET | Refills: 0 | Status: SHIPPED | OUTPATIENT
Start: 2021-07-14 | End: 2021-08-16

## 2021-07-16 ENCOUNTER — IMAGING SERVICES (OUTPATIENT)
Dept: GENERAL RADIOLOGY | Age: 51
End: 2021-07-16
Attending: SURGERY

## 2021-07-16 ENCOUNTER — OFFICE VISIT (OUTPATIENT)
Dept: SURGERY | Age: 51
End: 2021-07-16
Attending: SURGERY

## 2021-07-16 ENCOUNTER — APPOINTMENT (OUTPATIENT)
Dept: GENERAL RADIOLOGY | Age: 51
End: 2021-07-16
Attending: SURGERY

## 2021-07-16 DIAGNOSIS — C50.912 INFILTRATING DUCTAL CARCINOMA OF LEFT BREAST (CMD): Primary | ICD-10-CM

## 2021-07-16 DIAGNOSIS — C50.912 CARCINOMA OF LEFT BREAST METASTATIC TO AXILLARY LYMPH NODE (CMD): ICD-10-CM

## 2021-07-16 DIAGNOSIS — C77.3 CARCINOMA OF LEFT BREAST METASTATIC TO AXILLARY LYMPH NODE (CMD): ICD-10-CM

## 2021-07-16 DIAGNOSIS — C50.912 MALIGNANT NEOPLASM OF LEFT FEMALE BREAST, UNSPECIFIED ESTROGEN RECEPTOR STATUS, UNSPECIFIED SITE OF BREAST (CMD): ICD-10-CM

## 2021-07-16 DIAGNOSIS — Z98.890 POSTOPERATIVE STATE: ICD-10-CM

## 2021-07-16 LAB — PREGNANCY TEST, URINE: NEGATIVE

## 2021-07-16 PROCEDURE — 71045 X-RAY EXAM CHEST 1 VIEW: CPT | Performed by: RADIOLOGY

## 2021-07-16 PROCEDURE — 36561 INSERT TUNNELED CV CATH: CPT | Performed by: SURGERY

## 2021-07-16 PROCEDURE — 77001 FLUOROGUIDE FOR VEIN DEVICE: CPT | Performed by: SURGERY

## 2021-07-19 NOTE — CONSULTS
Cancer Center Report of Consultation    Patient Name: Ashtyn Mendoza   YOB: 1970   Medical Record Number: [id number]   CSN: [id number]   Consulting Physician: Adelina Riddle MD  Referring Physician(s): Blaire Danielson MD  Date of Consultation: prostate       Gyne History:    Menarche age 4. . LMP 3/20. Psychosocial History:    Single, elementary school .  No children.     Allergies:     Benzoyl Peroxide        OTHER (SEE COMMENTS), SWELLING    Comment:Eyes swell shut, muscle weakness. Neurological: No headaches, dizziness, seizures, speech problems, gait problems   Psych: No anxiety/depression.     Vital Signs:  /84 (BP Location: Right arm, Patient Position: Sitting, Cuff Size: adult)   Pulse 63   Temp 97.5 °F (36 examined: 3   Number positive:    3    Left Axillary Lymph Nodes (Part D):    Number examined: 4   Number positive:    3                   Total Lymph Nodes:     Number examined: 8    Number positive:  7      Tumor invades into perinodal adipose tissue, wi limited to nausea, vomiting, cytopenias, alopecia, neuropathy, risk for infection, diarrhea. We also discussed small risk of congestive heart failure, will check echocardiogram, and also small risk of bone marrow disorders.   After completion of chemothera

## 2021-07-20 ENCOUNTER — NURSE NAVIGATOR ENCOUNTER (OUTPATIENT)
Dept: HEMATOLOGY/ONCOLOGY | Facility: HOSPITAL | Age: 51
End: 2021-07-20

## 2021-07-20 ENCOUNTER — EXTERNAL RECORD (OUTPATIENT)
Dept: HEALTH INFORMATION MANAGEMENT | Facility: OTHER | Age: 51
End: 2021-07-20

## 2021-07-20 ENCOUNTER — OFFICE VISIT (OUTPATIENT)
Dept: HEMATOLOGY/ONCOLOGY | Facility: HOSPITAL | Age: 51
End: 2021-07-20
Attending: INTERNAL MEDICINE
Payer: COMMERCIAL

## 2021-07-20 VITALS
HEIGHT: 59.21 IN | HEART RATE: 63 BPM | SYSTOLIC BLOOD PRESSURE: 134 MMHG | DIASTOLIC BLOOD PRESSURE: 84 MMHG | BODY MASS INDEX: 36.53 KG/M2 | RESPIRATION RATE: 16 BRPM | WEIGHT: 181.19 LBS | TEMPERATURE: 98 F | OXYGEN SATURATION: 98 %

## 2021-07-20 DIAGNOSIS — C50.812 MALIGNANT NEOPLASM OF OVERLAPPING SITES OF LEFT BREAST IN FEMALE, ESTROGEN RECEPTOR POSITIVE (HCC): Primary | ICD-10-CM

## 2021-07-20 DIAGNOSIS — C77.9 REGIONAL LYMPH NODE METASTASIS PRESENT (HCC): ICD-10-CM

## 2021-07-20 DIAGNOSIS — Z17.0 MALIGNANT NEOPLASM OF OVERLAPPING SITES OF LEFT BREAST IN FEMALE, ESTROGEN RECEPTOR POSITIVE (HCC): Primary | ICD-10-CM

## 2021-07-20 PROCEDURE — 99245 OFF/OP CONSLTJ NEW/EST HI 55: CPT | Performed by: INTERNAL MEDICINE

## 2021-07-20 RX ORDER — BECLOMETHASONE DIPROPIONATE HFA 80 UG/1
2 AEROSOL, METERED RESPIRATORY (INHALATION) 2 TIMES DAILY
COMMUNITY
Start: 2021-06-17

## 2021-07-20 RX ORDER — HYDROCODONE BITARTRATE AND ACETAMINOPHEN 5; 325 MG/1; MG/1
1 TABLET ORAL
COMMUNITY
Start: 2021-06-29

## 2021-07-20 RX ORDER — LOSARTAN POTASSIUM 50 MG/1
50 TABLET ORAL DAILY
COMMUNITY
Start: 2021-07-14

## 2021-07-20 RX ORDER — ONDANSETRON HYDROCHLORIDE 8 MG/1
8 TABLET, FILM COATED ORAL EVERY 8 HOURS PRN
Qty: 30 TABLET | Refills: 3 | Status: SHIPPED | OUTPATIENT
Start: 2021-07-20 | End: 2021-11-26

## 2021-07-20 RX ORDER — FLUTICASONE PROPIONATE 50 MCG
2 SPRAY, SUSPENSION (ML) NASAL DAILY
COMMUNITY
Start: 2021-05-10

## 2021-07-20 RX ORDER — ALBUTEROL SULFATE 90 UG/1
2 AEROSOL, METERED RESPIRATORY (INHALATION)
COMMUNITY
Start: 2021-06-17

## 2021-07-20 RX ORDER — PROCHLORPERAZINE MALEATE 10 MG
10 TABLET ORAL EVERY 6 HOURS PRN
Qty: 30 TABLET | Refills: 3 | Status: SHIPPED | OUTPATIENT
Start: 2021-07-20

## 2021-07-20 RX ORDER — ALPRAZOLAM 0.25 MG/1
0.25 TABLET ORAL
COMMUNITY
Start: 2021-05-10

## 2021-07-20 RX ORDER — IBUPROFEN 600 MG/1
600 TABLET ORAL
COMMUNITY
Start: 2021-06-29

## 2021-07-20 NOTE — PROGRESS NOTES
6/29/21 left lumpectomy with ALND. Er/PA +, her 2 negative. Pt feeling well. Good ROM. Some numbness,. Swelling to left upper arm. Reports genetic testing showed 1 varient, will bring in result. Port placed. Pt had breast pain.    No menstruation,

## 2021-07-20 NOTE — PROGRESS NOTES
Met with patient in clinic. Introduced myself as the breast navigator nurse and explained the role of the breast nurse navigator and coordination of care. Pt had surgery with Dr. Lisa Hwang, has port placed.  Patient was given the contact information for the

## 2021-07-27 ENCOUNTER — HOSPITAL ENCOUNTER (OUTPATIENT)
Dept: CV DIAGNOSTICS | Age: 51
Discharge: HOME | End: 2021-07-27
Attending: INTERNAL MEDICINE
Payer: COMMERCIAL

## 2021-07-27 DIAGNOSIS — C50.812 MALIGNANT NEOPLASM OF OVERLAPPING SITES OF LEFT BREAST IN FEMALE, ESTROGEN RECEPTOR POSITIVE (HCC): ICD-10-CM

## 2021-07-27 DIAGNOSIS — C77.9 REGIONAL LYMPH NODE METASTASIS PRESENT (HCC): ICD-10-CM

## 2021-07-27 DIAGNOSIS — Z17.0 MALIGNANT NEOPLASM OF OVERLAPPING SITES OF LEFT BREAST IN FEMALE, ESTROGEN RECEPTOR POSITIVE (HCC): ICD-10-CM

## 2021-07-27 PROCEDURE — 93306 TTE W/DOPPLER COMPLETE: CPT | Performed by: INTERNAL MEDICINE

## 2021-07-28 ENCOUNTER — SOCIAL WORK SERVICES (OUTPATIENT)
Dept: HEMATOLOGY/ONCOLOGY | Facility: HOSPITAL | Age: 51
End: 2021-07-28

## 2021-07-28 ENCOUNTER — OFFICE VISIT (OUTPATIENT)
Dept: HEMATOLOGY/ONCOLOGY | Facility: HOSPITAL | Age: 51
End: 2021-07-28
Attending: INTERNAL MEDICINE
Payer: COMMERCIAL

## 2021-07-28 VITALS
SYSTOLIC BLOOD PRESSURE: 136 MMHG | WEIGHT: 176.81 LBS | OXYGEN SATURATION: 96 % | HEIGHT: 59.21 IN | TEMPERATURE: 98 F | BODY MASS INDEX: 35.64 KG/M2 | DIASTOLIC BLOOD PRESSURE: 85 MMHG | RESPIRATION RATE: 16 BRPM | HEART RATE: 78 BPM

## 2021-07-28 DIAGNOSIS — C50.812 MALIGNANT NEOPLASM OF OVERLAPPING SITES OF LEFT BREAST IN FEMALE, ESTROGEN RECEPTOR POSITIVE (HCC): ICD-10-CM

## 2021-07-28 DIAGNOSIS — C77.9 REGIONAL LYMPH NODE METASTASIS PRESENT (HCC): ICD-10-CM

## 2021-07-28 DIAGNOSIS — Z17.0 MALIGNANT NEOPLASM OF OVERLAPPING SITES OF LEFT BREAST IN FEMALE, ESTROGEN RECEPTOR POSITIVE (HCC): ICD-10-CM

## 2021-07-28 DIAGNOSIS — Z71.9 ENCOUNTER FOR HEALTH EDUCATION: Primary | ICD-10-CM

## 2021-07-28 DIAGNOSIS — C50.812 MALIGNANT NEOPLASM OF OVERLAPPING SITES OF LEFT BREAST IN FEMALE, ESTROGEN RECEPTOR POSITIVE (HCC): Primary | ICD-10-CM

## 2021-07-28 DIAGNOSIS — Z17.0 MALIGNANT NEOPLASM OF OVERLAPPING SITES OF LEFT BREAST IN FEMALE, ESTROGEN RECEPTOR POSITIVE (HCC): Primary | ICD-10-CM

## 2021-07-28 LAB
ALBUMIN SERPL-MCNC: 3.7 G/DL (ref 3.4–5)
ALBUMIN/GLOB SERPL: 1.2 {RATIO} (ref 1–2)
ALP LIVER SERPL-CCNC: 69 U/L
ALT SERPL-CCNC: 33 U/L
ANION GAP SERPL CALC-SCNC: 6 MMOL/L (ref 0–18)
AST SERPL-CCNC: 21 U/L (ref 15–37)
BASOPHILS # BLD AUTO: 0.02 X10(3) UL (ref 0–0.2)
BASOPHILS NFR BLD AUTO: 0.4 %
BILIRUB SERPL-MCNC: 0.9 MG/DL (ref 0.1–2)
BUN BLD-MCNC: 10 MG/DL (ref 7–18)
BUN/CREAT SERPL: 13.3 (ref 10–20)
CALCIUM BLD-MCNC: 8.6 MG/DL (ref 8.5–10.1)
CHLORIDE SERPL-SCNC: 108 MMOL/L (ref 98–112)
CO2 SERPL-SCNC: 26 MMOL/L (ref 21–32)
CREAT BLD-MCNC: 0.75 MG/DL
DEPRECATED RDW RBC AUTO: 41.6 FL (ref 35.1–46.3)
EOSINOPHIL # BLD AUTO: 0.26 X10(3) UL (ref 0–0.7)
EOSINOPHIL NFR BLD AUTO: 5.1 %
ERYTHROCYTE [DISTWIDTH] IN BLOOD BY AUTOMATED COUNT: 12.1 % (ref 11–15)
GLOBULIN PLAS-MCNC: 3.2 G/DL (ref 2.8–4.4)
GLUCOSE BLD-MCNC: 106 MG/DL (ref 70–99)
HCT VFR BLD AUTO: 34.3 %
HGB BLD-MCNC: 11.7 G/DL
IMM GRANULOCYTES # BLD AUTO: 0 X10(3) UL (ref 0–1)
IMM GRANULOCYTES NFR BLD: 0 %
LYMPHOCYTES # BLD AUTO: 1.4 X10(3) UL (ref 1–4)
LYMPHOCYTES NFR BLD AUTO: 27.5 %
M PROTEIN MFR SERPL ELPH: 6.9 G/DL (ref 6.4–8.2)
MCH RBC QN AUTO: 31.7 PG (ref 26–34)
MCHC RBC AUTO-ENTMCNC: 34.1 G/DL (ref 31–37)
MCV RBC AUTO: 93 FL
MONOCYTES # BLD AUTO: 0.45 X10(3) UL (ref 0.1–1)
MONOCYTES NFR BLD AUTO: 8.8 %
NEUTROPHILS # BLD AUTO: 2.97 X10 (3) UL (ref 1.5–7.7)
NEUTROPHILS # BLD AUTO: 2.97 X10(3) UL (ref 1.5–7.7)
NEUTROPHILS NFR BLD AUTO: 58.2 %
OSMOLALITY SERPL CALC.SUM OF ELEC: 289 MOSM/KG (ref 275–295)
PLATELET # BLD AUTO: 206 10(3)UL (ref 150–450)
POTASSIUM SERPL-SCNC: 4.2 MMOL/L (ref 3.5–5.1)
RBC # BLD AUTO: 3.69 X10(6)UL
SODIUM SERPL-SCNC: 140 MMOL/L (ref 136–145)
WBC # BLD AUTO: 5.1 X10(3) UL (ref 4–11)

## 2021-07-28 PROCEDURE — 96411 CHEMO IV PUSH ADDL DRUG: CPT

## 2021-07-28 PROCEDURE — 85025 COMPLETE CBC W/AUTO DIFF WBC: CPT

## 2021-07-28 PROCEDURE — 96367 TX/PROPH/DG ADDL SEQ IV INF: CPT

## 2021-07-28 PROCEDURE — 96377 APPLICATON ON-BODY INJECTOR: CPT

## 2021-07-28 PROCEDURE — 96413 CHEMO IV INFUSION 1 HR: CPT

## 2021-07-28 PROCEDURE — 96375 TX/PRO/DX INJ NEW DRUG ADDON: CPT

## 2021-07-28 PROCEDURE — 99417 PROLNG OP E/M EACH 15 MIN: CPT | Performed by: CLINICAL NURSE SPECIALIST

## 2021-07-28 PROCEDURE — 80053 COMPREHEN METABOLIC PANEL: CPT

## 2021-07-28 PROCEDURE — 99215 OFFICE O/P EST HI 40 MIN: CPT | Performed by: CLINICAL NURSE SPECIALIST

## 2021-07-28 RX ORDER — CETIRIZINE HYDROCHLORIDE 10 MG/1
CAPSULE, LIQUID FILLED ORAL
COMMUNITY

## 2021-07-28 NOTE — PATIENT INSTRUCTIONS
Anti-Nausea Medication:    Ondansetron (Zofran) -- can take every 8 hours as needed for nausea. Prochlorperazine (Compazine) -- can take every 6 hours as needed for nausea. To Alternate: Can take Compazine 4 hours after IV Zofran today.  Then 4 hours You may still travel, just avoid the xray security. 6. Avoid bumping or sleeping directly on the injector. 7. Avoid hot tubs, saunas and direct sunlight. Keep the injector dry 3 hours prior to the dose delivery time.        8. Remove the injector d

## 2021-07-28 NOTE — PROGRESS NOTES
SW met with patient in treatment room. Patient reports she lives alone and recently moved into a new apartment complex. Patient states she has no siblings, but does help her elderly parents often.  Patient reports multiple female family members have had farheen

## 2021-07-28 NOTE — PROGRESS NOTES
Pt here for C 1 D 1 AC.   Arrives Ambulating independently, accompanied by Self           Pregnancy screening: Not applicable    Modifications in dose or schedule: No     Frequency of blood return and site check throughout administration: Prior to [employer]

## 2021-07-28 NOTE — PROGRESS NOTES
Chemotherapy Education    Learner:  Patient    Barriers / Limitations:  None    Chemotherapy education goals:  · Learn the drug names  · Administration schedule  · Routes of administration  · Treatment setting    Drug names:    Adriamycin, Cyclophosphamide vaginal dryness:  Achieved    Notify MD/RN of any changes or problems:  Achieved    Comments:    Treatment Effects on Emotional Status:    Potential mood changes, depression, nervousness, difficulty sleeping:  Achieved    Importance of support system:  Ach

## 2021-07-29 ENCOUNTER — TELEPHONE (OUTPATIENT)
Dept: HEMATOLOGY/ONCOLOGY | Facility: HOSPITAL | Age: 51
End: 2021-07-29

## 2021-07-29 NOTE — TELEPHONE ENCOUNTER
Date of Treatment: 7/28/21                                Type of Chemo: C1D1 AC    Comments: Pt reports some fatigue. Denies any nausea or vomiting. States she was able to eat dinner last night without difficulty and slept well. No concerns at this time.

## 2021-08-04 ENCOUNTER — OFFICE VISIT (OUTPATIENT)
Dept: HEMATOLOGY/ONCOLOGY | Facility: HOSPITAL | Age: 51
End: 2021-08-04
Attending: INTERNAL MEDICINE
Payer: COMMERCIAL

## 2021-08-04 ENCOUNTER — EXTERNAL RECORD (OUTPATIENT)
Dept: HEALTH INFORMATION MANAGEMENT | Facility: OTHER | Age: 51
End: 2021-08-04

## 2021-08-04 VITALS
BODY MASS INDEX: 34.51 KG/M2 | HEIGHT: 59.21 IN | HEART RATE: 102 BPM | TEMPERATURE: 99 F | RESPIRATION RATE: 18 BRPM | SYSTOLIC BLOOD PRESSURE: 128 MMHG | WEIGHT: 171.19 LBS | OXYGEN SATURATION: 100 % | DIASTOLIC BLOOD PRESSURE: 75 MMHG

## 2021-08-04 VITALS — HEART RATE: 82 BPM

## 2021-08-04 DIAGNOSIS — R11.2 CINV (CHEMOTHERAPY-INDUCED NAUSEA AND VOMITING): ICD-10-CM

## 2021-08-04 DIAGNOSIS — T45.1X5A CINV (CHEMOTHERAPY-INDUCED NAUSEA AND VOMITING): ICD-10-CM

## 2021-08-04 DIAGNOSIS — C50.812 MALIGNANT NEOPLASM OF OVERLAPPING SITES OF LEFT BREAST IN FEMALE, ESTROGEN RECEPTOR POSITIVE (HCC): Primary | ICD-10-CM

## 2021-08-04 DIAGNOSIS — Z17.0 MALIGNANT NEOPLASM OF OVERLAPPING SITES OF LEFT BREAST IN FEMALE, ESTROGEN RECEPTOR POSITIVE (HCC): Primary | ICD-10-CM

## 2021-08-04 DIAGNOSIS — Z51.11 ENCOUNTER FOR CHEMOTHERAPY MANAGEMENT: ICD-10-CM

## 2021-08-04 DIAGNOSIS — R53.81 MALAISE: ICD-10-CM

## 2021-08-04 DIAGNOSIS — K21.9 GERD WITHOUT ESOPHAGITIS: ICD-10-CM

## 2021-08-04 RX ORDER — ESOMEPRAZOLE MAGNESIUM 40 MG/1
40 CAPSULE, DELAYED RELEASE ORAL
Qty: 90 CAPSULE | Refills: 0 | Status: SHIPPED | OUTPATIENT
Start: 2021-08-04 | End: 2021-11-10 | Stop reason: SDUPTHER

## 2021-08-04 NOTE — PROGRESS NOTES
ANP Visit Note    Patient Name: Joelle Siu   YOB: 1970   Medical Record Number: [id number]   CSN: [id number]   Date of visit: 8/4/2021   SAVAGE CONNER   No primary care provider on file.      Chief Complaint/Reason for Visit:  Patient pr History:  Family History   Problem Relation Age of Onset   • Hypertension Father    • Breast Cancer Mother 36   • Breast Cancer Maternal Grandmother 62   • Cancer Paternal Grandmother [de-identified]        stomach    • Cancer Brother         skin    • Cancer Maternal Rfl:   •  Albuterol Sulfate  (90 Base) MCG/ACT Inhalation Aero Soln, Inhale 2 puffs into the lungs. , Disp: , Rfl:   •  ALPRAZolam 0.25 MG Oral Tab, Take 0.25 mg by mouth., Disp: , Rfl:   •  Beclomethasone Diprop HFA (QVAR REDIHALER) 80 MCG/ACT Inhal lymphadenopathy. Neck is supple. Chest: Clear to auscultation. Heart: Regular rate and rhythm. Abdomen: Soft, non tender with good bowel sounds. Extremities: No edema. Neurological: Grossly intact. Lymphatics:  There is no palpable lymphadenopathy t

## 2021-08-04 NOTE — PROGRESS NOTES
Patient presents with: Follow - Up: APN assessment - Day 8    Pt is here for follow up Day 8 A/C. Pt states that she continues to have some belly discomfort. Had persistent nausea and vomiting x1 with some taste changes.  She has chronic sinus issues that

## 2021-08-05 ENCOUNTER — TELEPHONE (OUTPATIENT)
Dept: HEMATOLOGY/ONCOLOGY | Facility: HOSPITAL | Age: 51
End: 2021-08-05

## 2021-08-05 RX ORDER — FLUCONAZOLE 100 MG/1
TABLET ORAL
Qty: 4 TABLET | Refills: 0 | Status: SHIPPED | OUTPATIENT
Start: 2021-08-05 | End: 2021-09-20 | Stop reason: ALTCHOICE

## 2021-08-05 NOTE — TELEPHONE ENCOUNTER
Toxicities: C1 D1 Doxorubicin/Cyclophosphamide-Paclitaxel on 7/28/2021    Vaginal Burning & Itching: Henrik Reese reports today she started having vaginal burning and itching. She thinks she may be getting a yeast infection. No chills, shakes or fever. No abdominal or pelvic pain.)    I asked Jony Peoples to please call her OB/GYNE or her PCP to be evaluated. She verbalized understanding and agreed with the plan.

## 2021-08-05 NOTE — TELEPHONE ENCOUNTER
Patient stated she thinks she is starting to get a yeast infection and wants to address it right away, wants to know if she should use an over the counter cream or should she be prescribed some medication

## 2021-08-05 NOTE — TELEPHONE ENCOUNTER
Sue Hewitt asked me to call Estee Grijalva to let her know that since she saw her yesterday she will send a prescription to her pharmacy for fluconazole tabs. She will take 2 tabs the first day and 1 tab tomorrow and 1 on Saturday. I left the office phone number if she has any questions or concerns.

## 2021-08-10 NOTE — PROGRESS NOTES
[facility] Progress Note      Patient Name:  Ashtyn Mendoza  YOB: 1970  Medical Record Number:  [id number]    Date of visit:  8/11/2021    CHIEF COMPLAINT: L breast ca s/p lumpectomy.     HPI:     46year old that I initially saw in ALPRAZolam 0.25 MG Oral Tab Take 0.25 mg by mouth. • Beclomethasone Diprop HFA (QVAR REDIHALER) 80 MCG/ACT Inhalation Aerosol, Breath Activated Inhale 2 puffs into the lungs 2 (two) times daily.      • Fluticasone Propionate 50 MCG/ACT Nasal Suspension Chloride 113 (H) 98 - 112 mmol/L    CO2 24.0 21.0 - 32.0 mmol/L    Anion Gap 2 0 - 18 mmol/L    BUN 12 7 - 18 mg/dL    Creatinine 0.80 0.55 - 1.02 mg/dL    Calcium, Total 8.4 (L) 8.5 - 10.1 mg/dL    Calculated Osmolality 288 275 - 295 mOsm/kg    GFR, Non-A chemotherapy with chemo with DD AC-weekly Paclitaxel on 7/28/2021, tolerating with minimal side effects, proceed with cycle #2 today. After completion of chemotherapy, candidate for RT followed by endocrine therapy for 10 years.   Will plan adjuvant bispho

## 2021-08-11 ENCOUNTER — SOCIAL WORK SERVICES (OUTPATIENT)
Dept: HEMATOLOGY/ONCOLOGY | Facility: HOSPITAL | Age: 51
End: 2021-08-11

## 2021-08-11 ENCOUNTER — OFFICE VISIT (OUTPATIENT)
Dept: HEMATOLOGY/ONCOLOGY | Facility: HOSPITAL | Age: 51
End: 2021-08-11
Attending: INTERNAL MEDICINE
Payer: COMMERCIAL

## 2021-08-11 VITALS
HEIGHT: 59.21 IN | DIASTOLIC BLOOD PRESSURE: 86 MMHG | SYSTOLIC BLOOD PRESSURE: 127 MMHG | TEMPERATURE: 98 F | OXYGEN SATURATION: 98 % | BODY MASS INDEX: 35.16 KG/M2 | HEART RATE: 86 BPM | WEIGHT: 174.38 LBS | RESPIRATION RATE: 16 BRPM

## 2021-08-11 DIAGNOSIS — R11.2 CINV (CHEMOTHERAPY-INDUCED NAUSEA AND VOMITING): ICD-10-CM

## 2021-08-11 DIAGNOSIS — Z17.0 MALIGNANT NEOPLASM OF OVERLAPPING SITES OF LEFT BREAST IN FEMALE, ESTROGEN RECEPTOR POSITIVE (HCC): Primary | ICD-10-CM

## 2021-08-11 DIAGNOSIS — C50.812 MALIGNANT NEOPLASM OF OVERLAPPING SITES OF LEFT BREAST IN FEMALE, ESTROGEN RECEPTOR POSITIVE (HCC): Primary | ICD-10-CM

## 2021-08-11 DIAGNOSIS — T45.1X5A CINV (CHEMOTHERAPY-INDUCED NAUSEA AND VOMITING): ICD-10-CM

## 2021-08-11 DIAGNOSIS — C77.9 REGIONAL LYMPH NODE METASTASIS PRESENT (HCC): ICD-10-CM

## 2021-08-11 DIAGNOSIS — L65.8 ALOPECIA DUE TO CYTOTOXIC DRUG: ICD-10-CM

## 2021-08-11 DIAGNOSIS — T45.1X5D ADVERSE EFFECT OF CHEMOTHERAPY, SUBSEQUENT ENCOUNTER: ICD-10-CM

## 2021-08-11 DIAGNOSIS — T45.1X5A ALOPECIA DUE TO CYTOTOXIC DRUG: ICD-10-CM

## 2021-08-11 DIAGNOSIS — I10 ESSENTIAL HYPERTENSION: ICD-10-CM

## 2021-08-11 LAB
ALBUMIN SERPL-MCNC: 3.5 G/DL (ref 3.4–5)
ALBUMIN/GLOB SERPL: 1.1 {RATIO} (ref 1–2)
ALP LIVER SERPL-CCNC: 93 U/L
ALT SERPL-CCNC: 22 U/L
ANION GAP SERPL CALC-SCNC: 2 MMOL/L (ref 0–18)
AST SERPL-CCNC: 15 U/L (ref 15–37)
BASOPHILS # BLD: 0 X10(3) UL (ref 0–0.2)
BASOPHILS NFR BLD: 0 %
BILIRUB SERPL-MCNC: 0.4 MG/DL (ref 0.1–2)
BUN BLD-MCNC: 12 MG/DL (ref 7–18)
CALCIUM BLD-MCNC: 8.4 MG/DL (ref 8.5–10.1)
CHLORIDE SERPL-SCNC: 113 MMOL/L (ref 98–112)
CO2 SERPL-SCNC: 24 MMOL/L (ref 21–32)
CREAT BLD-MCNC: 0.8 MG/DL
EOSINOPHIL # BLD: 0 X10(3) UL (ref 0–0.7)
EOSINOPHIL NFR BLD: 0 %
ERYTHROCYTE [DISTWIDTH] IN BLOOD BY AUTOMATED COUNT: 11.3 %
GLOBULIN PLAS-MCNC: 3.3 G/DL (ref 2.8–4.4)
GLUCOSE BLD-MCNC: 95 MG/DL (ref 70–99)
HCT VFR BLD AUTO: 32.9 %
HGB BLD-MCNC: 11 G/DL
LYMPHOCYTES NFR BLD: 2.26 X10(3) UL (ref 1–4)
LYMPHOCYTES NFR BLD: 24 %
M PROTEIN MFR SERPL ELPH: 6.8 G/DL (ref 6.4–8.2)
MCH RBC QN AUTO: 30.8 PG (ref 26–34)
MCHC RBC AUTO-ENTMCNC: 33.4 G/DL (ref 31–37)
MCV RBC AUTO: 92.2 FL
MONOCYTES # BLD: 1.41 X10(3) UL (ref 0.1–1)
MONOCYTES NFR BLD: 15 %
MORPHOLOGY: NORMAL
NEUTROPHILS # BLD AUTO: 5.21 X10 (3) UL (ref 1.5–7.7)
NEUTROPHILS NFR BLD: 54 %
NEUTS BAND NFR BLD: 7 %
NEUTS HYPERSEG # BLD: 5.73 X10(3) UL (ref 1.5–7.7)
OSMOLALITY SERPL CALC.SUM OF ELEC: 288 MOSM/KG (ref 275–295)
PATIENT FASTING Y/N/NP: NO
PLATELET # BLD AUTO: 273 10(3)UL (ref 150–450)
PLATELET MORPHOLOGY: NORMAL
POTASSIUM SERPL-SCNC: 4.1 MMOL/L (ref 3.5–5.1)
RBC # BLD AUTO: 3.57 X10(6)UL
SODIUM SERPL-SCNC: 139 MMOL/L (ref 136–145)
TOTAL CELLS COUNTED: 100
WBC # BLD AUTO: 9.4 X10(3) UL (ref 4–11)

## 2021-08-11 PROCEDURE — 96411 CHEMO IV PUSH ADDL DRUG: CPT

## 2021-08-11 PROCEDURE — 96377 APPLICATON ON-BODY INJECTOR: CPT

## 2021-08-11 PROCEDURE — 96367 TX/PROPH/DG ADDL SEQ IV INF: CPT

## 2021-08-11 PROCEDURE — 96413 CHEMO IV INFUSION 1 HR: CPT

## 2021-08-11 PROCEDURE — 96375 TX/PRO/DX INJ NEW DRUG ADDON: CPT

## 2021-08-11 PROCEDURE — 85025 COMPLETE CBC W/AUTO DIFF WBC: CPT

## 2021-08-11 PROCEDURE — 85027 COMPLETE CBC AUTOMATED: CPT

## 2021-08-11 PROCEDURE — 99215 OFFICE O/P EST HI 40 MIN: CPT | Performed by: INTERNAL MEDICINE

## 2021-08-11 PROCEDURE — 85007 BL SMEAR W/DIFF WBC COUNT: CPT

## 2021-08-11 PROCEDURE — 80053 COMPREHEN METABOLIC PANEL: CPT

## 2021-08-11 NOTE — PROGRESS NOTES
Pt here for C2D1.   Arrives Ambulating independently, accompanied by Self           Pregnancy screening: Denies possibility of pregnancy    Modifications in dose or schedule: No     Frequency of blood return and site check throughout administration: Prior t

## 2021-08-11 NOTE — PROGRESS NOTES
Outpatient Oncology Care Plan  Problem list:  fatigue  knowledge deficit  nausea and vomiting    Problems related to:    chemotherapy  side effect of treatment    Interventions:  provided general teaching    Expected outcomes:  understands plan of care

## 2021-08-11 NOTE — PROGRESS NOTES
Sw completed [facility] paperwork and faxed to [employer]. Copy provided to patient with bags of martin donation.

## 2021-08-16 RX ORDER — LOSARTAN POTASSIUM 50 MG/1
TABLET ORAL
Qty: 45 TABLET | Refills: 0 | Status: SHIPPED | OUTPATIENT
Start: 2021-08-16 | End: 2021-08-30

## 2021-08-25 ENCOUNTER — APPOINTMENT (OUTPATIENT)
Dept: HEMATOLOGY/ONCOLOGY | Facility: HOSPITAL | Age: 51
End: 2021-08-25
Attending: INTERNAL MEDICINE
Payer: COMMERCIAL

## 2021-08-25 NOTE — PROGRESS NOTES
[facility] Progress Note      Patient Name:  Ashtyn Mendoza  YOB: 1970  Medical Record Number:  [id number]    Date of visit:  8/27/2021    CHIEF COMPLAINT: L breast ca s/p lumpectomy.     HPI:     46year old that I initially saw in MCG/ACT Inhalation Aerosol, Breath Activated Inhale 2 puffs into the lungs 2 (two) times daily. • Fluticasone Propionate 50 MCG/ACT Nasal Suspension 2 sprays by Nasal route daily.      • HYDROcodone-acetaminophen 5-325 MG Oral Tab Take 1 tablet by mouth Chloride 110 98 - 112 mmol/L    CO2 24.0 21.0 - 32.0 mmol/L    Anion Gap 5 0 - 18 mmol/L    BUN 12 7 - 18 mg/dL    Creatinine 0.76 0.55 - 1.02 mg/dL    Calcium, Total 8.3 (L) 8.5 - 10.1 mg/dL    Calculated Osmolality 288 275 - 295 mOsm/kg    GFR, Non-Afric 170 ug/dL    Transferrin 162 (L) 200 - 360 mg/dL    Total Iron Binding Capacity 241 240 - 450 ug/dL    % Saturation 28 15 - 50 %   VITAMIN B12    Collection Time: 08/27/21 10:06 AM   Result Value Ref Range    Vitamin B12 >2,000 (H) 193 - 986 pg/mL       AS

## 2021-08-27 ENCOUNTER — OFFICE VISIT (OUTPATIENT)
Dept: HEMATOLOGY/ONCOLOGY | Facility: HOSPITAL | Age: 51
End: 2021-08-27
Attending: INTERNAL MEDICINE
Payer: COMMERCIAL

## 2021-08-27 VITALS
WEIGHT: 176.38 LBS | RESPIRATION RATE: 16 BRPM | OXYGEN SATURATION: 99 % | HEIGHT: 59.21 IN | SYSTOLIC BLOOD PRESSURE: 148 MMHG | HEART RATE: 72 BPM | TEMPERATURE: 99 F | BODY MASS INDEX: 35.56 KG/M2 | DIASTOLIC BLOOD PRESSURE: 88 MMHG

## 2021-08-27 DIAGNOSIS — D64.9 ANEMIA, UNSPECIFIED TYPE: ICD-10-CM

## 2021-08-27 DIAGNOSIS — L65.8 ALOPECIA DUE TO CYTOTOXIC DRUG: ICD-10-CM

## 2021-08-27 DIAGNOSIS — C50.812 MALIGNANT NEOPLASM OF OVERLAPPING SITES OF LEFT BREAST IN FEMALE, ESTROGEN RECEPTOR POSITIVE (HCC): ICD-10-CM

## 2021-08-27 DIAGNOSIS — C50.812 MALIGNANT NEOPLASM OF OVERLAPPING SITES OF LEFT BREAST IN FEMALE, ESTROGEN RECEPTOR POSITIVE (HCC): Primary | ICD-10-CM

## 2021-08-27 DIAGNOSIS — Z17.0 MALIGNANT NEOPLASM OF OVERLAPPING SITES OF LEFT BREAST IN FEMALE, ESTROGEN RECEPTOR POSITIVE (HCC): ICD-10-CM

## 2021-08-27 DIAGNOSIS — Z17.0 MALIGNANT NEOPLASM OF OVERLAPPING SITES OF LEFT BREAST IN FEMALE, ESTROGEN RECEPTOR POSITIVE (HCC): Primary | ICD-10-CM

## 2021-08-27 DIAGNOSIS — R11.2 CINV (CHEMOTHERAPY-INDUCED NAUSEA AND VOMITING): ICD-10-CM

## 2021-08-27 DIAGNOSIS — T45.1X5A CINV (CHEMOTHERAPY-INDUCED NAUSEA AND VOMITING): ICD-10-CM

## 2021-08-27 DIAGNOSIS — D64.9 ANEMIA, UNSPECIFIED TYPE: Primary | ICD-10-CM

## 2021-08-27 DIAGNOSIS — T45.1X5A ALOPECIA DUE TO CYTOTOXIC DRUG: ICD-10-CM

## 2021-08-27 DIAGNOSIS — C77.9 REGIONAL LYMPH NODE METASTASIS PRESENT (HCC): ICD-10-CM

## 2021-08-27 DIAGNOSIS — Z51.11 ENCOUNTER FOR CHEMOTHERAPY MANAGEMENT: ICD-10-CM

## 2021-08-27 LAB
ALBUMIN SERPL-MCNC: 3.3 G/DL (ref 3.4–5)
ALBUMIN/GLOB SERPL: 1.1 {RATIO} (ref 1–2)
ALP LIVER SERPL-CCNC: 89 U/L
ALT SERPL-CCNC: 27 U/L
ANION GAP SERPL CALC-SCNC: 5 MMOL/L (ref 0–18)
AST SERPL-CCNC: 18 U/L (ref 15–37)
BASOPHILS # BLD: 0 X10(3) UL (ref 0–0.2)
BASOPHILS NFR BLD: 0 %
BILIRUB SERPL-MCNC: 0.4 MG/DL (ref 0.1–2)
BUN BLD-MCNC: 12 MG/DL (ref 7–18)
CALCIUM BLD-MCNC: 8.3 MG/DL (ref 8.5–10.1)
CHLORIDE SERPL-SCNC: 110 MMOL/L (ref 98–112)
CO2 SERPL-SCNC: 24 MMOL/L (ref 21–32)
CREAT BLD-MCNC: 0.76 MG/DL
DEPRECATED HBV CORE AB SER IA-ACNC: 342 NG/ML
EOSINOPHIL # BLD: 0.16 X10(3) UL (ref 0–0.7)
EOSINOPHIL NFR BLD: 2 %
ERYTHROCYTE [DISTWIDTH] IN BLOOD BY AUTOMATED COUNT: 11.7 %
GLOBULIN PLAS-MCNC: 3.1 G/DL (ref 2.8–4.4)
GLUCOSE BLD-MCNC: 94 MG/DL (ref 70–99)
HCT VFR BLD AUTO: 26.3 %
HGB BLD-MCNC: 8.8 G/DL
IRON SATURATION: 28 %
IRON SERPL-MCNC: 67 UG/DL
LYMPHOCYTES NFR BLD: 1.05 X10(3) UL (ref 1–4)
LYMPHOCYTES NFR BLD: 13 %
M PROTEIN MFR SERPL ELPH: 6.4 G/DL (ref 6.4–8.2)
MCH RBC QN AUTO: 30.6 PG (ref 26–34)
MCHC RBC AUTO-ENTMCNC: 33.5 G/DL (ref 31–37)
MCV RBC AUTO: 91.3 FL
METAMYELOCYTES # BLD: 0.16 X10(3) UL
METAMYELOCYTES NFR BLD: 2 %
MONOCYTES # BLD: 0.65 X10(3) UL (ref 0.1–1)
MONOCYTES NFR BLD: 8 %
MORPHOLOGY: NORMAL
NEUTROPHILS # BLD AUTO: 4.59 X10 (3) UL (ref 1.5–7.7)
NEUTROPHILS NFR BLD: 66 %
NEUTS BAND NFR BLD: 9 %
NEUTS HYPERSEG # BLD: 6.08 X10(3) UL (ref 1.5–7.7)
OSMOLALITY SERPL CALC.SUM OF ELEC: 288 MOSM/KG (ref 275–295)
PLATELET # BLD AUTO: 251 10(3)UL (ref 150–450)
PLATELET MORPHOLOGY: NORMAL
POTASSIUM SERPL-SCNC: 4.2 MMOL/L (ref 3.5–5.1)
RBC # BLD AUTO: 2.88 X10(6)UL
SODIUM SERPL-SCNC: 139 MMOL/L (ref 136–145)
TOTAL CELLS COUNTED: 100
TOTAL IRON BINDING CAPACITY: 241 UG/DL (ref 240–450)
TRANSFERRIN SERPL-MCNC: 162 MG/DL (ref 200–360)
VIT B12 SERPL-MCNC: >2000 PG/ML (ref 193–986)
WBC # BLD AUTO: 8.1 X10(3) UL (ref 4–11)

## 2021-08-27 PROCEDURE — 82607 VITAMIN B-12: CPT

## 2021-08-27 PROCEDURE — 96377 APPLICATON ON-BODY INJECTOR: CPT

## 2021-08-27 PROCEDURE — 85027 COMPLETE CBC AUTOMATED: CPT

## 2021-08-27 PROCEDURE — 96411 CHEMO IV PUSH ADDL DRUG: CPT

## 2021-08-27 PROCEDURE — 96375 TX/PRO/DX INJ NEW DRUG ADDON: CPT

## 2021-08-27 PROCEDURE — 96413 CHEMO IV INFUSION 1 HR: CPT

## 2021-08-27 PROCEDURE — 96367 TX/PROPH/DG ADDL SEQ IV INF: CPT

## 2021-08-27 PROCEDURE — 83550 IRON BINDING TEST: CPT

## 2021-08-27 PROCEDURE — 99215 OFFICE O/P EST HI 40 MIN: CPT | Performed by: INTERNAL MEDICINE

## 2021-08-27 PROCEDURE — 85025 COMPLETE CBC W/AUTO DIFF WBC: CPT

## 2021-08-27 PROCEDURE — 83540 ASSAY OF IRON: CPT

## 2021-08-27 PROCEDURE — 82728 ASSAY OF FERRITIN: CPT

## 2021-08-27 PROCEDURE — 85007 BL SMEAR W/DIFF WBC COUNT: CPT

## 2021-08-27 PROCEDURE — 80053 COMPREHEN METABOLIC PANEL: CPT

## 2021-08-27 NOTE — PATIENT INSTRUCTIONS
On-body Injector for Neulasta Patient Instructions       Your On-Body Injection device was applied on this day:  __________ at this time ___________    Your dose of medication will start on this day: ____________

## 2021-08-27 NOTE — PROGRESS NOTES
Pt here for C3D1 A/C.   Arrives Ambulating independently, accompanied by Self           Pregnancy screening: Not applicable    Modifications in dose or schedule: No     Frequency of blood return and site check throughout administration: Prior to administrat

## 2021-08-27 NOTE — PROGRESS NOTES
Education Record    Learner:  Patient    Disease / Diagnosis:breast cancer  D1C3 AC    Barriers / Limitations:  None   Comments:    Method:  Discussion   Comments:    General Topics:  Plan of care reviewed   Comments:    Outcome:  Shows understanding   Com

## 2021-08-30 ENCOUNTER — TELEPHONE (OUTPATIENT)
Dept: INTERNAL MEDICINE | Age: 51
End: 2021-08-30

## 2021-08-30 DIAGNOSIS — I10 ESSENTIAL HYPERTENSION: ICD-10-CM

## 2021-08-30 RX ORDER — LOSARTAN POTASSIUM 50 MG/1
50 TABLET ORAL DAILY
Qty: 30 TABLET | Refills: 0 | Status: SHIPPED | COMMUNITY
Start: 2021-08-30 | End: 2021-09-24

## 2021-09-07 ENCOUNTER — TELEPHONE (OUTPATIENT)
Dept: HEMATOLOGY/ONCOLOGY | Facility: HOSPITAL | Age: 51
End: 2021-09-07

## 2021-09-07 NOTE — TELEPHONE ENCOUNTER
Patient called to advise she receivecd her Covid Booster Shot today. Secondly   She goes in to see her PCP just to have Blood pressure checked . Olaf Butler   PCP suggested that when her BP is taken at Dr Quique Franklin  If they could send her the reading    , PATRIA [facility]

## 2021-09-08 ENCOUNTER — TELEPHONE (OUTPATIENT)
Dept: HEMATOLOGY/ONCOLOGY | Facility: HOSPITAL | Age: 51
End: 2021-09-08

## 2021-09-08 NOTE — TELEPHONE ENCOUNTER
Tay Metzger called she is due in on Friday for tx, she had the [place] Lien yesterday and had a fever last night of 102, today down to 100.5, also H/A and body aches that happen with Vac. She will continue to monitor.

## 2021-09-08 NOTE — PROGRESS NOTES
[facility] Progress Note      Patient Name:  Maris Fernandes  YOB: 1970  Medical Record Number:  [id number]    Date of visit:  9/10/2021      CHIEF COMPLAINT: L breast ca s/p lumpectomy.     HPI:     46year old that I initially saw i Base) MCG/ACT Inhalation Aero Soln Inhale 2 puffs into the lungs. • ALPRAZolam 0.25 MG Oral Tab Take 0.25 mg by mouth.      • Beclomethasone Diprop HFA (QVAR REDIHALER) 80 MCG/ACT Inhalation Aerosol, Breath Activated Inhale 2 puffs into the lungs 2 (two Results (from the past 24 hour(s))   COMP METABOLIC PANEL (14)    Collection Time: 09/10/21  9:19 AM   Result Value Ref Range    Glucose 98 70 - 99 mg/dL    Sodium 139 136 - 145 mmol/L    Potassium 4.2 3.5 - 5.1 mmol/L    Chloride 108 98 - 112 mmol/L    CO RBC morphology. Platelet Morphology Normal Normal       ASSESSMENT AND PLAN:     # L breast ca  (T3N3Mx): 46year old postmenopausal female s/p L lumpectomy/ALND 6/21.  Path- 4 cm gr 2 IDC, 7+ LN,  there was invasion into perinodal adipose tissue with

## 2021-09-10 ENCOUNTER — OFFICE VISIT (OUTPATIENT)
Dept: HEMATOLOGY/ONCOLOGY | Facility: HOSPITAL | Age: 51
End: 2021-09-10
Attending: INTERNAL MEDICINE
Payer: COMMERCIAL

## 2021-09-10 VITALS
HEART RATE: 84 BPM | WEIGHT: 172.19 LBS | BODY MASS INDEX: 34.71 KG/M2 | TEMPERATURE: 98 F | SYSTOLIC BLOOD PRESSURE: 114 MMHG | RESPIRATION RATE: 16 BRPM | DIASTOLIC BLOOD PRESSURE: 74 MMHG | OXYGEN SATURATION: 99 % | HEIGHT: 59.22 IN

## 2021-09-10 DIAGNOSIS — T45.1X5D ADVERSE EFFECT OF CHEMOTHERAPY, SUBSEQUENT ENCOUNTER: ICD-10-CM

## 2021-09-10 DIAGNOSIS — Z17.0 MALIGNANT NEOPLASM OF OVERLAPPING SITES OF LEFT BREAST IN FEMALE, ESTROGEN RECEPTOR POSITIVE (HCC): Primary | ICD-10-CM

## 2021-09-10 DIAGNOSIS — D64.9 ANEMIA, UNSPECIFIED TYPE: ICD-10-CM

## 2021-09-10 DIAGNOSIS — T45.1X5A CINV (CHEMOTHERAPY-INDUCED NAUSEA AND VOMITING): ICD-10-CM

## 2021-09-10 DIAGNOSIS — C50.812 MALIGNANT NEOPLASM OF OVERLAPPING SITES OF LEFT BREAST IN FEMALE, ESTROGEN RECEPTOR POSITIVE (HCC): Primary | ICD-10-CM

## 2021-09-10 DIAGNOSIS — C77.9 REGIONAL LYMPH NODE METASTASIS PRESENT (HCC): ICD-10-CM

## 2021-09-10 DIAGNOSIS — R11.2 CINV (CHEMOTHERAPY-INDUCED NAUSEA AND VOMITING): ICD-10-CM

## 2021-09-10 LAB
ALBUMIN SERPL-MCNC: 3.5 G/DL (ref 3.4–5)
ALBUMIN/GLOB SERPL: 1 {RATIO} (ref 1–2)
ALP LIVER SERPL-CCNC: 84 U/L
ALT SERPL-CCNC: 30 U/L
ANION GAP SERPL CALC-SCNC: 6 MMOL/L (ref 0–18)
AST SERPL-CCNC: 25 U/L (ref 15–37)
BASOPHILS # BLD: 0 X10(3) UL (ref 0–0.2)
BASOPHILS NFR BLD: 0 %
BILIRUB SERPL-MCNC: 0.4 MG/DL (ref 0.1–2)
BUN BLD-MCNC: 7 MG/DL (ref 7–18)
CALCIUM BLD-MCNC: 8.7 MG/DL (ref 8.5–10.1)
CHLORIDE SERPL-SCNC: 108 MMOL/L (ref 98–112)
CO2 SERPL-SCNC: 25 MMOL/L (ref 21–32)
CREAT BLD-MCNC: 0.73 MG/DL
EOSINOPHIL # BLD: 0 X10(3) UL (ref 0–0.7)
EOSINOPHIL NFR BLD: 0 %
ERYTHROCYTE [DISTWIDTH] IN BLOOD BY AUTOMATED COUNT: 13.5 %
GLOBULIN PLAS-MCNC: 3.4 G/DL (ref 2.8–4.4)
GLUCOSE BLD-MCNC: 98 MG/DL (ref 70–99)
HCT VFR BLD AUTO: 24.4 %
HGB BLD-MCNC: 8.1 G/DL
LYMPHOCYTES NFR BLD: 1.57 X10(3) UL (ref 1–4)
LYMPHOCYTES NFR BLD: 27 %
M PROTEIN MFR SERPL ELPH: 6.9 G/DL (ref 6.4–8.2)
MCH RBC QN AUTO: 30.1 PG (ref 26–34)
MCHC RBC AUTO-ENTMCNC: 33.2 G/DL (ref 31–37)
MCV RBC AUTO: 90.7 FL
METAMYELOCYTES # BLD: 0.12 X10(3) UL
METAMYELOCYTES NFR BLD: 2 %
MONOCYTES # BLD: 0.35 X10(3) UL (ref 0.1–1)
MONOCYTES NFR BLD: 6 %
MORPHOLOGY: NORMAL
MYELOCYTES # BLD: 0.12 X10(3) UL
MYELOCYTES NFR BLD: 2 %
NEUTROPHILS # BLD AUTO: 3.34 X10 (3) UL (ref 1.5–7.7)
NEUTROPHILS NFR BLD: 33 %
NEUTS BAND NFR BLD: 30 %
NEUTS HYPERSEG # BLD: 3.65 X10(3) UL (ref 1.5–7.7)
OSMOLALITY SERPL CALC.SUM OF ELEC: 286 MOSM/KG (ref 275–295)
PATIENT FASTING Y/N/NP: NO
PLATELET # BLD AUTO: 201 10(3)UL (ref 150–450)
PLATELET MORPHOLOGY: NORMAL
POTASSIUM SERPL-SCNC: 4.2 MMOL/L (ref 3.5–5.1)
RBC # BLD AUTO: 2.69 X10(6)UL
SODIUM SERPL-SCNC: 139 MMOL/L (ref 136–145)
TOTAL CELLS COUNTED: 100
WBC # BLD AUTO: 5.8 X10(3) UL (ref 4–11)

## 2021-09-10 PROCEDURE — 85007 BL SMEAR W/DIFF WBC COUNT: CPT

## 2021-09-10 PROCEDURE — 96411 CHEMO IV PUSH ADDL DRUG: CPT

## 2021-09-10 PROCEDURE — 99215 OFFICE O/P EST HI 40 MIN: CPT | Performed by: INTERNAL MEDICINE

## 2021-09-10 PROCEDURE — 80053 COMPREHEN METABOLIC PANEL: CPT

## 2021-09-10 PROCEDURE — 85025 COMPLETE CBC W/AUTO DIFF WBC: CPT

## 2021-09-10 PROCEDURE — 96375 TX/PRO/DX INJ NEW DRUG ADDON: CPT

## 2021-09-10 PROCEDURE — 96413 CHEMO IV INFUSION 1 HR: CPT

## 2021-09-10 PROCEDURE — 85027 COMPLETE CBC AUTOMATED: CPT

## 2021-09-10 PROCEDURE — 96377 APPLICATON ON-BODY INJECTOR: CPT

## 2021-09-10 PROCEDURE — 96367 TX/PROPH/DG ADDL SEQ IV INF: CPT

## 2021-09-10 RX ORDER — DEXAMETHASONE 4 MG/1
8 TABLET ORAL 2 TIMES DAILY
Qty: 12 TABLET | Refills: 0 | Status: SHIPPED | OUTPATIENT
Start: 2021-09-10 | End: 2021-09-13

## 2021-09-10 NOTE — PROGRESS NOTES
Education Record    Learner:  Patient    Disease / Diagnosis: brest cancer D1C4 AC      Barriers / Limitations:  None   Comments:    Method:  Discussion   Comments:    General Topics:  Plan of care reviewed   Comments:    Outcome:  Shows understanding  Katy

## 2021-09-10 NOTE — PROGRESS NOTES
Pt here for C4D1.   Arrives Ambulating independently, accompanied by Self           Pregnancy screening: Denies possibility of pregnancy    Modifications in dose or schedule: No     Frequency of blood return and site check throughout administration: Prior t

## 2021-09-19 ENCOUNTER — WALK IN (OUTPATIENT)
Dept: URGENT CARE | Age: 51
End: 2021-09-19

## 2021-09-19 VITALS
DIASTOLIC BLOOD PRESSURE: 67 MMHG | TEMPERATURE: 98.7 F | HEART RATE: 95 BPM | OXYGEN SATURATION: 98 % | SYSTOLIC BLOOD PRESSURE: 124 MMHG

## 2021-09-19 DIAGNOSIS — J02.9 SORE THROAT: Primary | ICD-10-CM

## 2021-09-19 DIAGNOSIS — J02.9 PHARYNGITIS, UNSPECIFIED ETIOLOGY: ICD-10-CM

## 2021-09-19 LAB
INTERNAL PROCEDURAL CONTROLS ACCEPTABLE: YES
S PYO AG THROAT QL IA.RAPID: NEGATIVE
SARS-COV+SARS-COV-2 AG RESP QL IA.RAPID: NOT DETECTED

## 2021-09-19 PROCEDURE — 87426 SARSCOV CORONAVIRUS AG IA: CPT | Performed by: EMERGENCY MEDICINE

## 2021-09-19 PROCEDURE — 87880 STREP A ASSAY W/OPTIC: CPT | Performed by: EMERGENCY MEDICINE

## 2021-09-19 PROCEDURE — 99214 OFFICE O/P EST MOD 30 MIN: CPT | Performed by: EMERGENCY MEDICINE

## 2021-09-19 PROCEDURE — 3074F SYST BP LT 130 MM HG: CPT | Performed by: EMERGENCY MEDICINE

## 2021-09-19 PROCEDURE — 3078F DIAST BP <80 MM HG: CPT | Performed by: EMERGENCY MEDICINE

## 2021-09-19 RX ORDER — AMOXICILLIN AND CLAVULANATE POTASSIUM 875; 125 MG/1; MG/1
1 TABLET, FILM COATED ORAL EVERY 12 HOURS
Qty: 20 TABLET | Refills: 0 | Status: SHIPPED | OUTPATIENT
Start: 2021-09-19 | End: 2022-02-05 | Stop reason: ALTCHOICE

## 2021-09-19 ASSESSMENT — PAIN SCALES - GENERAL: PAINLEVEL: 7

## 2021-09-20 ENCOUNTER — TELEPHONE (OUTPATIENT)
Dept: HEMATOLOGY/ONCOLOGY | Facility: HOSPITAL | Age: 51
End: 2021-09-20

## 2021-09-20 RX ORDER — AMOXICILLIN AND CLAVULANATE POTASSIUM 875; 125 MG/1; MG/1
1 TABLET, FILM COATED ORAL EVERY 12 HOURS
COMMUNITY
Start: 2021-09-19 | End: 2021-10-01 | Stop reason: ALTCHOICE

## 2021-09-20 NOTE — TELEPHONE ENCOUNTER
Patient went to Immediate Care on Sunday 9/19 due to a serve sore throat, had a strep & COVID test both came back negative, also has sores in mouth.  Patient was put on an antibiotic, wanted to know if the mouth sores could be due to her treatment

## 2021-09-20 NOTE — TELEPHONE ENCOUNTER
RN called pt to f/u after her visit to immediate care over the weekend for mouth sores and sore throat. Pt was given abx, which has helped sore throat. Pt states she has visible white spots on side of tongue and underneath tongue. Tongue \"feels swollen\".

## 2021-09-21 NOTE — PROGRESS NOTES
[facility] Progress Note      Patient Name:  Marquis Cisneros  YOB: 1970  Medical Record Number:  [id number]    Date of visit: 9/24/2021    CHIEF COMPLAINT: L breast ca s/p lumpectomy.     HPI:     46year old that I initially saw in 9 capsule (40 mg total) by mouth every morning before breakfast. 90 capsule 0   • Cetirizine HCl (ZYRTEC ALLERGY) 10 MG Oral Cap Take by mouth. • Albuterol Sulfate  (90 Base) MCG/ACT Inhalation Aero Soln Inhale 2 puffs into the lungs.      • ALPRAZ were identified.      LABS:     Recent Results (from the past 24 hour(s))   COMP METABOLIC PANEL (14)    Collection Time: 09/24/21 10:09 AM   Result Value Ref Range    Glucose 98 70 - 99 mg/dL    Sodium 138 136 - 145 mmol/L    Potassium 4.2 3.5 - 5.1 mmol/L chemotherapy. Hb 6. Transfuse and proceed with Paclitaxel as it's less bone marrow suppressive. ORDERS PLACED:    Return in about 1 week (around 10/1/2021) for Labs, MD visit, Chemo. Peter Acosta M.D.     [facility]    Jarred Wilkins

## 2021-09-24 ENCOUNTER — OFFICE VISIT (OUTPATIENT)
Dept: HEMATOLOGY/ONCOLOGY | Facility: HOSPITAL | Age: 51
End: 2021-09-24
Attending: INTERNAL MEDICINE
Payer: COMMERCIAL

## 2021-09-24 VITALS
TEMPERATURE: 99 F | SYSTOLIC BLOOD PRESSURE: 116 MMHG | HEART RATE: 76 BPM | BODY MASS INDEX: 33.91 KG/M2 | DIASTOLIC BLOOD PRESSURE: 86 MMHG | HEIGHT: 59.21 IN | OXYGEN SATURATION: 95 % | WEIGHT: 168.19 LBS | RESPIRATION RATE: 18 BRPM

## 2021-09-24 DIAGNOSIS — C77.9 REGIONAL LYMPH NODE METASTASIS PRESENT (HCC): ICD-10-CM

## 2021-09-24 DIAGNOSIS — Z17.0 MALIGNANT NEOPLASM OF OVERLAPPING SITES OF LEFT BREAST IN FEMALE, ESTROGEN RECEPTOR POSITIVE (HCC): Primary | ICD-10-CM

## 2021-09-24 DIAGNOSIS — D64.9 ANEMIA, UNSPECIFIED TYPE: ICD-10-CM

## 2021-09-24 DIAGNOSIS — D64.81 ANTINEOPLASTIC CHEMOTHERAPY INDUCED ANEMIA: ICD-10-CM

## 2021-09-24 DIAGNOSIS — R11.2 CINV (CHEMOTHERAPY-INDUCED NAUSEA AND VOMITING): ICD-10-CM

## 2021-09-24 DIAGNOSIS — C50.812 MALIGNANT NEOPLASM OF OVERLAPPING SITES OF LEFT BREAST IN FEMALE, ESTROGEN RECEPTOR POSITIVE (HCC): Primary | ICD-10-CM

## 2021-09-24 DIAGNOSIS — T45.1X5A ANTINEOPLASTIC CHEMOTHERAPY INDUCED ANEMIA: ICD-10-CM

## 2021-09-24 DIAGNOSIS — T45.1X5A CINV (CHEMOTHERAPY-INDUCED NAUSEA AND VOMITING): ICD-10-CM

## 2021-09-24 LAB
ALBUMIN SERPL-MCNC: 3.4 G/DL (ref 3.4–5)
ALBUMIN/GLOB SERPL: 1 {RATIO} (ref 1–2)
ALP LIVER SERPL-CCNC: 76 U/L
ALT SERPL-CCNC: 21 U/L
ANION GAP SERPL CALC-SCNC: 6 MMOL/L (ref 0–18)
ANTIBODY SCREEN: NEGATIVE
AST SERPL-CCNC: 14 U/L (ref 15–37)
BASOPHILS # BLD: 0 X10(3) UL (ref 0–0.2)
BASOPHILS NFR BLD: 0 %
BILIRUB SERPL-MCNC: 0.5 MG/DL (ref 0.1–2)
BUN BLD-MCNC: 6 MG/DL (ref 7–18)
CALCIUM BLD-MCNC: 8.6 MG/DL (ref 8.5–10.1)
CHLORIDE SERPL-SCNC: 110 MMOL/L (ref 98–112)
CO2 SERPL-SCNC: 22 MMOL/L (ref 21–32)
CREAT BLD-MCNC: 0.75 MG/DL
DEPRECATED HBV CORE AB SER IA-ACNC: 666.3 NG/ML
EOSINOPHIL # BLD: 0 X10(3) UL (ref 0–0.7)
EOSINOPHIL NFR BLD: 0 %
ERYTHROCYTE [DISTWIDTH] IN BLOOD BY AUTOMATED COUNT: 14.2 %
GLOBULIN PLAS-MCNC: 3.5 G/DL (ref 2.8–4.4)
GLUCOSE BLD-MCNC: 98 MG/DL (ref 70–99)
HCT VFR BLD AUTO: 17.5 %
HGB BLD-MCNC: 6 G/DL
IRON SATURATION: 48 %
IRON SERPL-MCNC: 110 UG/DL
LYMPHOCYTES NFR BLD: 0.78 X10(3) UL (ref 1–4)
LYMPHOCYTES NFR BLD: 11 %
MCH RBC QN AUTO: 31.3 PG (ref 26–34)
MCHC RBC AUTO-ENTMCNC: 34.3 G/DL (ref 31–37)
MCV RBC AUTO: 91.1 FL
MONOCYTES # BLD: 0.28 X10(3) UL (ref 0.1–1)
MONOCYTES NFR BLD: 4 %
MORPHOLOGY: NORMAL
NEUTROPHILS # BLD AUTO: 4.92 X10 (3) UL (ref 1.5–7.7)
NEUTROPHILS NFR BLD: 82 %
NEUTS BAND NFR BLD: 3 %
NEUTS HYPERSEG # BLD: 6.04 X10(3) UL (ref 1.5–7.7)
NRBC BLD MANUAL-RTO: 1 %
OSMOLALITY SERPL CALC.SUM OF ELEC: 284 MOSM/KG (ref 275–295)
PATIENT FASTING Y/N/NP: NO
PLATELET # BLD AUTO: 199 10(3)UL (ref 150–450)
PLATELET MORPHOLOGY: NORMAL
POTASSIUM SERPL-SCNC: 4.2 MMOL/L (ref 3.5–5.1)
PROT SERPL-MCNC: 6.9 G/DL (ref 6.4–8.2)
RBC # BLD AUTO: 1.92 X10(6)UL
RH BLOOD TYPE: POSITIVE
SODIUM SERPL-SCNC: 138 MMOL/L (ref 136–145)
TOTAL CELLS COUNTED: 100
TOTAL IRON BINDING CAPACITY: 231 UG/DL (ref 240–450)
TRANSFERRIN SERPL-MCNC: 155 MG/DL (ref 200–360)
WBC # BLD AUTO: 7.1 X10(3) UL (ref 4–11)

## 2021-09-24 PROCEDURE — 82728 ASSAY OF FERRITIN: CPT

## 2021-09-24 PROCEDURE — S0028 INJECTION, FAMOTIDINE, 20 MG: HCPCS | Performed by: INTERNAL MEDICINE

## 2021-09-24 PROCEDURE — 85027 COMPLETE CBC AUTOMATED: CPT

## 2021-09-24 PROCEDURE — 86901 BLOOD TYPING SEROLOGIC RH(D): CPT

## 2021-09-24 PROCEDURE — 96413 CHEMO IV INFUSION 1 HR: CPT

## 2021-09-24 PROCEDURE — 83550 IRON BINDING TEST: CPT

## 2021-09-24 PROCEDURE — 86920 COMPATIBILITY TEST SPIN: CPT

## 2021-09-24 PROCEDURE — 80053 COMPREHEN METABOLIC PANEL: CPT

## 2021-09-24 PROCEDURE — 86850 RBC ANTIBODY SCREEN: CPT

## 2021-09-24 PROCEDURE — 99215 OFFICE O/P EST HI 40 MIN: CPT | Performed by: INTERNAL MEDICINE

## 2021-09-24 PROCEDURE — 86900 BLOOD TYPING SEROLOGIC ABO: CPT

## 2021-09-24 PROCEDURE — 96375 TX/PRO/DX INJ NEW DRUG ADDON: CPT

## 2021-09-24 PROCEDURE — 85025 COMPLETE CBC W/AUTO DIFF WBC: CPT

## 2021-09-24 PROCEDURE — 85007 BL SMEAR W/DIFF WBC COUNT: CPT

## 2021-09-24 PROCEDURE — 83540 ASSAY OF IRON: CPT

## 2021-09-24 PROCEDURE — 36430 TRANSFUSION BLD/BLD COMPNT: CPT

## 2021-09-24 RX ORDER — ACETAMINOPHEN 325 MG/1
650 TABLET ORAL ONCE
Status: CANCELLED | OUTPATIENT
Start: 2021-09-24

## 2021-09-24 RX ORDER — DIPHENHYDRAMINE HCL 25 MG
25 CAPSULE ORAL ONCE
Status: CANCELLED | OUTPATIENT
Start: 2021-09-24

## 2021-09-24 RX ORDER — DIPHENHYDRAMINE HCL 25 MG
25 CAPSULE ORAL ONCE
OUTPATIENT
Start: 2021-09-24

## 2021-09-24 RX ORDER — DIPHENHYDRAMINE HYDROCHLORIDE 50 MG/ML
25 INJECTION INTRAMUSCULAR; INTRAVENOUS ONCE
Status: CANCELLED | OUTPATIENT
Start: 2021-09-24

## 2021-09-24 RX ORDER — FAMOTIDINE 10 MG/ML
20 INJECTION, SOLUTION INTRAVENOUS ONCE
Status: CANCELLED | OUTPATIENT
Start: 2021-09-24

## 2021-09-24 RX ORDER — FAMOTIDINE 10 MG/ML
20 INJECTION, SOLUTION INTRAVENOUS ONCE
Status: COMPLETED | OUTPATIENT
Start: 2021-09-24 | End: 2021-09-24

## 2021-09-24 RX ORDER — DIPHENHYDRAMINE HYDROCHLORIDE 50 MG/ML
25 INJECTION INTRAMUSCULAR; INTRAVENOUS ONCE
Status: COMPLETED | OUTPATIENT
Start: 2021-09-24 | End: 2021-09-24

## 2021-09-24 RX ORDER — ACETAMINOPHEN 325 MG/1
650 TABLET ORAL ONCE
Status: COMPLETED | OUTPATIENT
Start: 2021-09-24 | End: 2021-09-24

## 2021-09-24 RX ORDER — LOSARTAN POTASSIUM 50 MG/1
TABLET ORAL
Qty: 45 TABLET | Refills: 0 | Status: SHIPPED | OUTPATIENT
Start: 2021-09-24 | End: 2021-10-26

## 2021-09-24 RX ADMIN — DIPHENHYDRAMINE HYDROCHLORIDE 25 MG: 50 INJECTION INTRAMUSCULAR; INTRAVENOUS at 11:18:00

## 2021-09-24 RX ADMIN — FAMOTIDINE 20 MG: 10 INJECTION, SOLUTION INTRAVENOUS at 11:19:00

## 2021-09-24 RX ADMIN — ACETAMINOPHEN 650 MG: 325 TABLET ORAL at 12:46:00

## 2021-09-24 NOTE — PROGRESS NOTES
Pt here for C5D1.   Arrives Ambulating independently, accompanied by Self           Pregnancy screening: Denies possibility of pregnancy    Modifications in dose or schedule: No     Frequency of blood return and site check throughout administration: Prior t

## 2021-09-24 NOTE — PROGRESS NOTES
Education Record    Learner:  Patient    Disease / Diagnosis:OTV breast cancer   D1C5 Taxol     Barriers / Limitations:  None   Comments:    Method:  Discussion   Comments:    General Topics:  Plan of care reviewed   Comments:    Outcome:  Shows understand

## 2021-09-25 LAB — BLOOD TYPE BARCODE: 5100

## 2021-09-28 NOTE — PROGRESS NOTES
[facility] Progress Note      Patient Name:  Skyler Villarreal  YOB: 1970  Medical Record Number:  [id number]    Date of visit: 10/1/2021      CHIEF COMPLAINT: L breast ca s/p lumpectomy.     HPI:     46year old that I initially saw in 2 puffs into the lungs. • ALPRAZolam 0.25 MG Oral Tab Take 0.25 mg by mouth. • Beclomethasone Diprop HFA (QVAR REDIHALER) 80 MCG/ACT Inhalation Aerosol, Breath Activated Inhale 2 puffs into the lungs 2 (two) times daily.      • Fluticasone Propionat Started weekly paclitaxel last week, tolerating with mild side effects. Proceed with week #2. After completion of chemotherapy, candidate for RT followed by endocrine therapy for 10 years.   Will plan adjuvant bisphosphonates and consider adjuvant CDK

## 2021-10-01 ENCOUNTER — OFFICE VISIT (OUTPATIENT)
Dept: HEMATOLOGY/ONCOLOGY | Facility: HOSPITAL | Age: 51
End: 2021-10-01
Attending: INTERNAL MEDICINE
Payer: COMMERCIAL

## 2021-10-01 VITALS
TEMPERATURE: 98 F | HEART RATE: 89 BPM | BODY MASS INDEX: 34.07 KG/M2 | HEIGHT: 59.21 IN | SYSTOLIC BLOOD PRESSURE: 112 MMHG | RESPIRATION RATE: 16 BRPM | WEIGHT: 169 LBS | OXYGEN SATURATION: 99 % | DIASTOLIC BLOOD PRESSURE: 67 MMHG

## 2021-10-01 DIAGNOSIS — R11.2 CINV (CHEMOTHERAPY-INDUCED NAUSEA AND VOMITING): ICD-10-CM

## 2021-10-01 DIAGNOSIS — C77.9 REGIONAL LYMPH NODE METASTASIS PRESENT (HCC): ICD-10-CM

## 2021-10-01 DIAGNOSIS — D64.9 ANEMIA, UNSPECIFIED TYPE: ICD-10-CM

## 2021-10-01 DIAGNOSIS — Z17.0 MALIGNANT NEOPLASM OF OVERLAPPING SITES OF LEFT BREAST IN FEMALE, ESTROGEN RECEPTOR POSITIVE (HCC): Primary | ICD-10-CM

## 2021-10-01 DIAGNOSIS — T45.1X5D ADVERSE EFFECT OF CHEMOTHERAPY, SUBSEQUENT ENCOUNTER: ICD-10-CM

## 2021-10-01 DIAGNOSIS — T45.1X5A CINV (CHEMOTHERAPY-INDUCED NAUSEA AND VOMITING): ICD-10-CM

## 2021-10-01 DIAGNOSIS — C50.812 MALIGNANT NEOPLASM OF OVERLAPPING SITES OF LEFT BREAST IN FEMALE, ESTROGEN RECEPTOR POSITIVE (HCC): Primary | ICD-10-CM

## 2021-10-01 DIAGNOSIS — L65.8 ALOPECIA DUE TO CYTOTOXIC DRUG: ICD-10-CM

## 2021-10-01 DIAGNOSIS — T45.1X5A ALOPECIA DUE TO CYTOTOXIC DRUG: ICD-10-CM

## 2021-10-01 PROCEDURE — 96413 CHEMO IV INFUSION 1 HR: CPT

## 2021-10-01 PROCEDURE — 85025 COMPLETE CBC W/AUTO DIFF WBC: CPT

## 2021-10-01 PROCEDURE — 99215 OFFICE O/P EST HI 40 MIN: CPT | Performed by: INTERNAL MEDICINE

## 2021-10-01 PROCEDURE — S0028 INJECTION, FAMOTIDINE, 20 MG: HCPCS | Performed by: INTERNAL MEDICINE

## 2021-10-01 PROCEDURE — 96375 TX/PRO/DX INJ NEW DRUG ADDON: CPT

## 2021-10-01 RX ORDER — DIPHENHYDRAMINE HYDROCHLORIDE 50 MG/ML
25 INJECTION INTRAMUSCULAR; INTRAVENOUS ONCE
Status: COMPLETED | OUTPATIENT
Start: 2021-10-01 | End: 2021-10-01

## 2021-10-01 RX ORDER — FAMOTIDINE 10 MG/ML
20 INJECTION, SOLUTION INTRAVENOUS ONCE
Status: COMPLETED | OUTPATIENT
Start: 2021-10-01 | End: 2021-10-01

## 2021-10-01 RX ORDER — DIPHENHYDRAMINE HYDROCHLORIDE 50 MG/ML
25 INJECTION INTRAMUSCULAR; INTRAVENOUS ONCE
Status: CANCELLED | OUTPATIENT
Start: 2021-10-08

## 2021-10-01 RX ORDER — FAMOTIDINE 10 MG/ML
20 INJECTION, SOLUTION INTRAVENOUS ONCE
Status: CANCELLED | OUTPATIENT
Start: 2021-10-01

## 2021-10-01 RX ORDER — DIPHENHYDRAMINE HYDROCHLORIDE 50 MG/ML
25 INJECTION INTRAMUSCULAR; INTRAVENOUS ONCE
Status: CANCELLED | OUTPATIENT
Start: 2021-10-01

## 2021-10-01 RX ORDER — FAMOTIDINE 10 MG/ML
20 INJECTION, SOLUTION INTRAVENOUS ONCE
Status: CANCELLED | OUTPATIENT
Start: 2021-10-08

## 2021-10-01 RX ADMIN — FAMOTIDINE 20 MG: 10 INJECTION, SOLUTION INTRAVENOUS at 11:11:00

## 2021-10-01 RX ADMIN — DIPHENHYDRAMINE HYDROCHLORIDE 25 MG: 50 INJECTION INTRAMUSCULAR; INTRAVENOUS at 11:11:00

## 2021-10-01 NOTE — PROGRESS NOTES
Pt here for C5D8 taxol.   Arrives Ambulating independently, accompanied by Self           Pregnancy screening: Denies possibility of pregnancy    Modifications in dose or schedule: No     Frequency of blood return and site check throughout administration: P

## 2021-10-01 NOTE — PROGRESS NOTES
Education Record    Learner:  Patient    Disease / Diagnosis:breast ca  Barriers / Limitations:  None   Comments:    Method:  Discussion   Comments:    General Topics:  Plan of care reviewed   Comments:    Outcome:  Shows understanding   Comments:    Pt fe

## 2021-10-08 ENCOUNTER — ORDER TRANSCRIPTION (OUTPATIENT)
Dept: PHYSICAL THERAPY | Facility: HOSPITAL | Age: 51
End: 2021-10-08

## 2021-10-08 ENCOUNTER — OFFICE VISIT (OUTPATIENT)
Dept: HEMATOLOGY/ONCOLOGY | Facility: HOSPITAL | Age: 51
End: 2021-10-08
Attending: INTERNAL MEDICINE
Payer: COMMERCIAL

## 2021-10-08 VITALS
OXYGEN SATURATION: 97 % | BODY MASS INDEX: 34.44 KG/M2 | DIASTOLIC BLOOD PRESSURE: 74 MMHG | HEART RATE: 83 BPM | HEIGHT: 59.21 IN | SYSTOLIC BLOOD PRESSURE: 118 MMHG | WEIGHT: 170.81 LBS | TEMPERATURE: 99 F | RESPIRATION RATE: 20 BRPM

## 2021-10-08 DIAGNOSIS — C77.9 REGIONAL LYMPH NODE METASTASIS PRESENT (HCC): ICD-10-CM

## 2021-10-08 DIAGNOSIS — C77.9 SECONDARY MALIGNANT NEOPLASM OF LYMPH NODES (HCC): ICD-10-CM

## 2021-10-08 DIAGNOSIS — C50.812 MALIGNANT NEOPLASM OF OVERLAPPING SITES OF LEFT BREAST IN FEMALE, ESTROGEN RECEPTOR POSITIVE (HCC): Primary | ICD-10-CM

## 2021-10-08 DIAGNOSIS — Z17.0 MALIGNANT NEOPLASM OF OVERLAPPING SITES OF LEFT BREAST IN FEMALE, ESTROGEN RECEPTOR POSITIVE (HCC): Primary | ICD-10-CM

## 2021-10-08 DIAGNOSIS — Z17.0 ESTROGEN RECEPTOR POSITIVE STATUS (ER+): ICD-10-CM

## 2021-10-08 DIAGNOSIS — C50.812 MALIGNANT NEOPLASM OF OVERLAPPING SITES OF LEFT FEMALE BREAST (HCC): Primary | ICD-10-CM

## 2021-10-08 PROCEDURE — S0028 INJECTION, FAMOTIDINE, 20 MG: HCPCS | Performed by: INTERNAL MEDICINE

## 2021-10-08 PROCEDURE — 96375 TX/PRO/DX INJ NEW DRUG ADDON: CPT

## 2021-10-08 PROCEDURE — 96413 CHEMO IV INFUSION 1 HR: CPT

## 2021-10-08 PROCEDURE — 85025 COMPLETE CBC W/AUTO DIFF WBC: CPT

## 2021-10-08 RX ORDER — DIPHENHYDRAMINE HYDROCHLORIDE 50 MG/ML
25 INJECTION INTRAMUSCULAR; INTRAVENOUS ONCE
Status: COMPLETED | OUTPATIENT
Start: 2021-10-08 | End: 2021-10-08

## 2021-10-08 RX ORDER — FAMOTIDINE 10 MG/ML
20 INJECTION, SOLUTION INTRAVENOUS ONCE
Status: COMPLETED | OUTPATIENT
Start: 2021-10-08 | End: 2021-10-08

## 2021-10-08 RX ADMIN — DIPHENHYDRAMINE HYDROCHLORIDE 25 MG: 50 INJECTION INTRAMUSCULAR; INTRAVENOUS at 11:34:00

## 2021-10-08 RX ADMIN — FAMOTIDINE 20 MG: 10 INJECTION, SOLUTION INTRAVENOUS at 11:38:00

## 2021-10-08 NOTE — PROGRESS NOTES
Pt here for C5D15 taxol.   Arrives Ambulating independently, accompanied by Self           Pregnancy screening: Denies possibility of pregnancy    Modifications in dose or schedule: No     Frequency of blood return and site check throughout administration:

## 2021-10-13 NOTE — PROGRESS NOTES
[facility] Progress Note      Patient Name:  Katharina Murphy  YOB: 1970  Medical Record Number:  [id number]    Date of visit: 10/15/2021    CHIEF COMPLAINT: L breast ca s/p lumpectomy.     HPI:     46year old that I initially saw in  (90 Base) MCG/ACT Inhalation Aero Soln Inhale 2 puffs into the lungs. • ALPRAZolam 0.25 MG Oral Tab Take 0.25 mg by mouth.      • Beclomethasone Diprop HFA (QVAR REDIHALER) 80 MCG/ACT Inhalation Aerosol, Breath Activated Inhale 2 puffs into the Glucose 116 (H) 70 - 99 mg/dL    Sodium 137 136 - 145 mmol/L    Potassium 3.9 3.5 - 5.1 mmol/L    Chloride 106 98 - 112 mmol/L    CO2 23.0 21.0 - 32.0 mmol/L    Anion Gap 8 0 - 18 mmol/L    BUN 12 7 - 18 mg/dL    Creatinine 0.79 0.55 - 1.02 mg/dL    Trip tolerating with mild side effects. Proceed with week # 5/12. After completion of chemotherapy, candidate for RT followed by endocrine therapy for 10 years, adjuvant Abemaciclib x 2 years and adjuvant bisphosphonates. # Anemia: Due to chemotherapy.

## 2021-10-15 ENCOUNTER — OFFICE VISIT (OUTPATIENT)
Dept: HEMATOLOGY/ONCOLOGY | Facility: HOSPITAL | Age: 51
End: 2021-10-15
Attending: INTERNAL MEDICINE
Payer: COMMERCIAL

## 2021-10-15 VITALS
BODY MASS INDEX: 34.2 KG/M2 | OXYGEN SATURATION: 98 % | DIASTOLIC BLOOD PRESSURE: 91 MMHG | RESPIRATION RATE: 18 BRPM | TEMPERATURE: 98 F | HEART RATE: 93 BPM | SYSTOLIC BLOOD PRESSURE: 143 MMHG | HEIGHT: 59.21 IN | WEIGHT: 169.63 LBS

## 2021-10-15 DIAGNOSIS — C50.812 MALIGNANT NEOPLASM OF OVERLAPPING SITES OF LEFT BREAST IN FEMALE, ESTROGEN RECEPTOR POSITIVE (HCC): Primary | ICD-10-CM

## 2021-10-15 DIAGNOSIS — L65.8 ALOPECIA DUE TO CYTOTOXIC DRUG: ICD-10-CM

## 2021-10-15 DIAGNOSIS — Z17.0 MALIGNANT NEOPLASM OF OVERLAPPING SITES OF LEFT BREAST IN FEMALE, ESTROGEN RECEPTOR POSITIVE (HCC): Primary | ICD-10-CM

## 2021-10-15 DIAGNOSIS — T45.1X5A ALOPECIA DUE TO CYTOTOXIC DRUG: ICD-10-CM

## 2021-10-15 DIAGNOSIS — D64.81 ANTINEOPLASTIC CHEMOTHERAPY INDUCED ANEMIA: ICD-10-CM

## 2021-10-15 DIAGNOSIS — D64.9 ANEMIA, UNSPECIFIED TYPE: ICD-10-CM

## 2021-10-15 DIAGNOSIS — T45.1X5A ANTINEOPLASTIC CHEMOTHERAPY INDUCED ANEMIA: ICD-10-CM

## 2021-10-15 DIAGNOSIS — T45.1X5A CINV (CHEMOTHERAPY-INDUCED NAUSEA AND VOMITING): ICD-10-CM

## 2021-10-15 DIAGNOSIS — R11.2 CINV (CHEMOTHERAPY-INDUCED NAUSEA AND VOMITING): ICD-10-CM

## 2021-10-15 DIAGNOSIS — C77.9 REGIONAL LYMPH NODE METASTASIS PRESENT (HCC): ICD-10-CM

## 2021-10-15 PROCEDURE — 80053 COMPREHEN METABOLIC PANEL: CPT

## 2021-10-15 PROCEDURE — 99215 OFFICE O/P EST HI 40 MIN: CPT | Performed by: INTERNAL MEDICINE

## 2021-10-15 PROCEDURE — 85025 COMPLETE CBC W/AUTO DIFF WBC: CPT

## 2021-10-15 PROCEDURE — 96375 TX/PRO/DX INJ NEW DRUG ADDON: CPT

## 2021-10-15 PROCEDURE — S0028 INJECTION, FAMOTIDINE, 20 MG: HCPCS | Performed by: INTERNAL MEDICINE

## 2021-10-15 PROCEDURE — 96413 CHEMO IV INFUSION 1 HR: CPT

## 2021-10-15 RX ORDER — FAMOTIDINE 10 MG/ML
20 INJECTION, SOLUTION INTRAVENOUS ONCE
Status: CANCELLED | OUTPATIENT
Start: 2021-10-15

## 2021-10-15 RX ORDER — FAMOTIDINE 10 MG/ML
20 INJECTION, SOLUTION INTRAVENOUS ONCE
Status: CANCELLED | OUTPATIENT
Start: 2021-10-22

## 2021-10-15 RX ORDER — DIPHENHYDRAMINE HYDROCHLORIDE 50 MG/ML
25 INJECTION INTRAMUSCULAR; INTRAVENOUS ONCE
Status: CANCELLED | OUTPATIENT
Start: 2021-10-15

## 2021-10-15 RX ORDER — DIPHENHYDRAMINE HYDROCHLORIDE 50 MG/ML
25 INJECTION INTRAMUSCULAR; INTRAVENOUS ONCE
Status: COMPLETED | OUTPATIENT
Start: 2021-10-15 | End: 2021-10-15

## 2021-10-15 RX ORDER — DIPHENHYDRAMINE HYDROCHLORIDE 50 MG/ML
25 INJECTION INTRAMUSCULAR; INTRAVENOUS ONCE
Status: CANCELLED | OUTPATIENT
Start: 2021-10-29

## 2021-10-15 RX ORDER — DIPHENHYDRAMINE HYDROCHLORIDE 50 MG/ML
25 INJECTION INTRAMUSCULAR; INTRAVENOUS ONCE
Status: CANCELLED | OUTPATIENT
Start: 2021-10-22

## 2021-10-15 RX ORDER — FAMOTIDINE 10 MG/ML
20 INJECTION, SOLUTION INTRAVENOUS ONCE
Status: CANCELLED | OUTPATIENT
Start: 2021-10-29

## 2021-10-15 RX ORDER — FAMOTIDINE 10 MG/ML
20 INJECTION, SOLUTION INTRAVENOUS ONCE
Status: COMPLETED | OUTPATIENT
Start: 2021-10-15 | End: 2021-10-15

## 2021-10-15 RX ADMIN — FAMOTIDINE 20 MG: 10 INJECTION, SOLUTION INTRAVENOUS at 12:21:00

## 2021-10-15 RX ADMIN — DIPHENHYDRAMINE HYDROCHLORIDE 25 MG: 50 INJECTION INTRAMUSCULAR; INTRAVENOUS at 12:19:00

## 2021-10-15 NOTE — PROGRESS NOTES
Education Record    Learner:  Patient    Disease / Diagnosis:  Breast ca  Barriers / Limitations:  None   Comments:    Method:  Discussion   Comments:    General Topics:  Plan of care reviewed   Comments:    Outcome:  Shows understanding   Comments:    OTV

## 2021-10-15 NOTE — PROGRESS NOTES
Pt here for C6D1 of Taxol.   Arrives Ambulating independently, accompanied by Self           Pregnancy screening: Denies possibility of pregnancy    Modifications in dose or schedule: Yes     Frequency of blood return and site check throughout administratio

## 2021-10-22 ENCOUNTER — OFFICE VISIT (OUTPATIENT)
Dept: HEMATOLOGY/ONCOLOGY | Facility: HOSPITAL | Age: 51
End: 2021-10-22
Attending: INTERNAL MEDICINE
Payer: COMMERCIAL

## 2021-10-22 VITALS
SYSTOLIC BLOOD PRESSURE: 134 MMHG | HEART RATE: 88 BPM | OXYGEN SATURATION: 99 % | RESPIRATION RATE: 16 BRPM | WEIGHT: 170.19 LBS | BODY MASS INDEX: 34.31 KG/M2 | HEIGHT: 59.21 IN | TEMPERATURE: 98 F | DIASTOLIC BLOOD PRESSURE: 88 MMHG

## 2021-10-22 DIAGNOSIS — C50.812 MALIGNANT NEOPLASM OF OVERLAPPING SITES OF LEFT BREAST IN FEMALE, ESTROGEN RECEPTOR POSITIVE (HCC): Primary | ICD-10-CM

## 2021-10-22 DIAGNOSIS — Z17.0 MALIGNANT NEOPLASM OF OVERLAPPING SITES OF LEFT BREAST IN FEMALE, ESTROGEN RECEPTOR POSITIVE (HCC): Primary | ICD-10-CM

## 2021-10-22 DIAGNOSIS — C77.9 REGIONAL LYMPH NODE METASTASIS PRESENT (HCC): ICD-10-CM

## 2021-10-22 PROCEDURE — 96413 CHEMO IV INFUSION 1 HR: CPT

## 2021-10-22 PROCEDURE — 85025 COMPLETE CBC W/AUTO DIFF WBC: CPT

## 2021-10-22 PROCEDURE — 96375 TX/PRO/DX INJ NEW DRUG ADDON: CPT

## 2021-10-22 PROCEDURE — S0028 INJECTION, FAMOTIDINE, 20 MG: HCPCS | Performed by: INTERNAL MEDICINE

## 2021-10-22 RX ORDER — FAMOTIDINE 10 MG/ML
20 INJECTION, SOLUTION INTRAVENOUS ONCE
Status: COMPLETED | OUTPATIENT
Start: 2021-10-22 | End: 2021-10-22

## 2021-10-22 RX ORDER — DIPHENHYDRAMINE HYDROCHLORIDE 50 MG/ML
25 INJECTION INTRAMUSCULAR; INTRAVENOUS ONCE
Status: COMPLETED | OUTPATIENT
Start: 2021-10-22 | End: 2021-10-22

## 2021-10-22 RX ADMIN — FAMOTIDINE 20 MG: 10 INJECTION, SOLUTION INTRAVENOUS at 12:24:00

## 2021-10-22 RX ADMIN — DIPHENHYDRAMINE HYDROCHLORIDE 25 MG: 50 INJECTION INTRAMUSCULAR; INTRAVENOUS at 12:22:00

## 2021-10-22 NOTE — PROGRESS NOTES
Patient here for C6/D8 Taxol. C/O of feeling tired, alternating diarrhea and constipation, but the patient said it is manageable. Had an episode of vomiting last week, but nothing after that.  Experiencing some tingling to her hands in the morning then it g

## 2021-10-26 RX ORDER — LOSARTAN POTASSIUM 50 MG/1
TABLET ORAL
Qty: 45 TABLET | Refills: 0 | Status: SHIPPED | OUTPATIENT
Start: 2021-10-26 | End: 2021-11-26 | Stop reason: DRUGHIGH

## 2021-10-28 ENCOUNTER — APPOINTMENT (OUTPATIENT)
Dept: OCCUPATIONAL MEDICINE | Facility: HOSPITAL | Age: 51
End: 2021-10-28
Attending: INTERNAL MEDICINE
Payer: COMMERCIAL

## 2021-10-29 ENCOUNTER — OFFICE VISIT (OUTPATIENT)
Dept: HEMATOLOGY/ONCOLOGY | Facility: HOSPITAL | Age: 51
End: 2021-10-29
Attending: INTERNAL MEDICINE
Payer: COMMERCIAL

## 2021-10-29 VITALS
HEART RATE: 98 BPM | SYSTOLIC BLOOD PRESSURE: 108 MMHG | HEIGHT: 59.21 IN | DIASTOLIC BLOOD PRESSURE: 61 MMHG | TEMPERATURE: 98 F | RESPIRATION RATE: 18 BRPM | BODY MASS INDEX: 33.67 KG/M2 | OXYGEN SATURATION: 98 % | WEIGHT: 167 LBS

## 2021-10-29 DIAGNOSIS — C77.9 REGIONAL LYMPH NODE METASTASIS PRESENT (HCC): ICD-10-CM

## 2021-10-29 DIAGNOSIS — Z17.0 MALIGNANT NEOPLASM OF OVERLAPPING SITES OF LEFT BREAST IN FEMALE, ESTROGEN RECEPTOR POSITIVE (HCC): Primary | ICD-10-CM

## 2021-10-29 DIAGNOSIS — C50.812 MALIGNANT NEOPLASM OF OVERLAPPING SITES OF LEFT BREAST IN FEMALE, ESTROGEN RECEPTOR POSITIVE (HCC): Primary | ICD-10-CM

## 2021-10-29 PROCEDURE — S0028 INJECTION, FAMOTIDINE, 20 MG: HCPCS | Performed by: INTERNAL MEDICINE

## 2021-10-29 PROCEDURE — 85025 COMPLETE CBC W/AUTO DIFF WBC: CPT

## 2021-10-29 PROCEDURE — 96413 CHEMO IV INFUSION 1 HR: CPT

## 2021-10-29 PROCEDURE — 96375 TX/PRO/DX INJ NEW DRUG ADDON: CPT

## 2021-10-29 RX ORDER — FAMOTIDINE 10 MG/ML
20 INJECTION, SOLUTION INTRAVENOUS ONCE
Status: COMPLETED | OUTPATIENT
Start: 2021-10-29 | End: 2021-10-29

## 2021-10-29 RX ORDER — DIPHENHYDRAMINE HYDROCHLORIDE 50 MG/ML
25 INJECTION INTRAMUSCULAR; INTRAVENOUS ONCE
Status: COMPLETED | OUTPATIENT
Start: 2021-10-29 | End: 2021-10-29

## 2021-10-29 RX ADMIN — FAMOTIDINE 20 MG: 10 INJECTION, SOLUTION INTRAVENOUS at 12:34:00

## 2021-10-29 RX ADMIN — DIPHENHYDRAMINE HYDROCHLORIDE 25 MG: 50 INJECTION INTRAMUSCULAR; INTRAVENOUS at 12:34:00

## 2021-10-29 NOTE — PROGRESS NOTES
Pt here for C6D15 Taxol.   Arrives Ambulating independently, accompanied by Self           Pregnancy screening: Not applicable    Modifications in dose or schedule: No     Frequency of blood return and site check throughout administration: Prior to administ preop not signed yet.    Oliva LOPEZ, Patient Care

## 2021-11-03 NOTE — PROGRESS NOTES
[facility] Progress Note      Patient Name:  Skyler Villarreal  YOB: 1970  Medical Record Number:  [id number]    Date of visit: 11/5/2021    CHIEF COMPLAINT: L breast ca s/p lumpectomy.     HPI:     46year old that I initially saw in 9 puffs into the lungs. • ALPRAZolam 0.25 MG Oral Tab Take 0.25 mg by mouth. • Beclomethasone Diprop HFA (QVAR REDIHALER) 80 MCG/ACT Inhalation Aerosol, Breath Activated Inhale 2 puffs into the lungs 2 (two) times daily.      • Fluticasone Propionate Started weekly paclitaxel last week, tolerating with mild side effects. Proceed with week # 7/12.      After completion of chemotherapy, candidate for RT followed by endocrine therapy for 10 years, adjuvant Abemaciclib x 2 years and adjuvant bisphosphonate

## 2021-11-05 ENCOUNTER — OFFICE VISIT (OUTPATIENT)
Dept: HEMATOLOGY/ONCOLOGY | Facility: HOSPITAL | Age: 51
End: 2021-11-05
Attending: INTERNAL MEDICINE
Payer: COMMERCIAL

## 2021-11-05 VITALS
SYSTOLIC BLOOD PRESSURE: 130 MMHG | BODY MASS INDEX: 33.5 KG/M2 | WEIGHT: 166.19 LBS | OXYGEN SATURATION: 97 % | HEART RATE: 107 BPM | DIASTOLIC BLOOD PRESSURE: 88 MMHG | HEIGHT: 59.21 IN

## 2021-11-05 DIAGNOSIS — C50.812 MALIGNANT NEOPLASM OF OVERLAPPING SITES OF LEFT BREAST IN FEMALE, ESTROGEN RECEPTOR POSITIVE (HCC): Primary | ICD-10-CM

## 2021-11-05 DIAGNOSIS — C77.9 REGIONAL LYMPH NODE METASTASIS PRESENT (HCC): ICD-10-CM

## 2021-11-05 DIAGNOSIS — T45.1X5A CINV (CHEMOTHERAPY-INDUCED NAUSEA AND VOMITING): ICD-10-CM

## 2021-11-05 DIAGNOSIS — Z17.0 MALIGNANT NEOPLASM OF OVERLAPPING SITES OF LEFT BREAST IN FEMALE, ESTROGEN RECEPTOR POSITIVE (HCC): Primary | ICD-10-CM

## 2021-11-05 DIAGNOSIS — R11.2 CINV (CHEMOTHERAPY-INDUCED NAUSEA AND VOMITING): ICD-10-CM

## 2021-11-05 DIAGNOSIS — T45.1X5D ADVERSE EFFECT OF CHEMOTHERAPY, SUBSEQUENT ENCOUNTER: ICD-10-CM

## 2021-11-05 PROCEDURE — 80053 COMPREHEN METABOLIC PANEL: CPT

## 2021-11-05 PROCEDURE — 96375 TX/PRO/DX INJ NEW DRUG ADDON: CPT

## 2021-11-05 PROCEDURE — 96413 CHEMO IV INFUSION 1 HR: CPT

## 2021-11-05 PROCEDURE — 85025 COMPLETE CBC W/AUTO DIFF WBC: CPT

## 2021-11-05 PROCEDURE — 99215 OFFICE O/P EST HI 40 MIN: CPT | Performed by: INTERNAL MEDICINE

## 2021-11-05 PROCEDURE — S0028 INJECTION, FAMOTIDINE, 20 MG: HCPCS | Performed by: INTERNAL MEDICINE

## 2021-11-05 RX ORDER — DIPHENHYDRAMINE HYDROCHLORIDE 50 MG/ML
25 INJECTION INTRAMUSCULAR; INTRAVENOUS ONCE
Status: COMPLETED | OUTPATIENT
Start: 2021-11-05 | End: 2021-11-05

## 2021-11-05 RX ORDER — DIPHENHYDRAMINE HYDROCHLORIDE 50 MG/ML
25 INJECTION INTRAMUSCULAR; INTRAVENOUS ONCE
Status: CANCELLED | OUTPATIENT
Start: 2021-11-12

## 2021-11-05 RX ORDER — FAMOTIDINE 10 MG/ML
20 INJECTION, SOLUTION INTRAVENOUS ONCE
Status: CANCELLED | OUTPATIENT
Start: 2021-11-12

## 2021-11-05 RX ORDER — AMOXICILLIN AND CLAVULANATE POTASSIUM 875; 125 MG/1; MG/1
1 TABLET, FILM COATED ORAL EVERY 12 HOURS
Qty: 20 TABLET | Refills: 0 | Status: SHIPPED | OUTPATIENT
Start: 2021-11-05 | End: 2021-11-19 | Stop reason: ALTCHOICE

## 2021-11-05 RX ORDER — FAMOTIDINE 10 MG/ML
20 INJECTION, SOLUTION INTRAVENOUS ONCE
Status: CANCELLED | OUTPATIENT
Start: 2021-11-05

## 2021-11-05 RX ORDER — DIPHENHYDRAMINE HYDROCHLORIDE 50 MG/ML
25 INJECTION INTRAMUSCULAR; INTRAVENOUS ONCE
Status: CANCELLED | OUTPATIENT
Start: 2021-11-05

## 2021-11-05 RX ORDER — FAMOTIDINE 10 MG/ML
20 INJECTION, SOLUTION INTRAVENOUS ONCE
Status: COMPLETED | OUTPATIENT
Start: 2021-11-05 | End: 2021-11-05

## 2021-11-05 RX ADMIN — FAMOTIDINE 20 MG: 10 INJECTION, SOLUTION INTRAVENOUS at 11:38:00

## 2021-11-05 RX ADMIN — DIPHENHYDRAMINE HYDROCHLORIDE 25 MG: 50 INJECTION INTRAMUSCULAR; INTRAVENOUS at 11:38:00

## 2021-11-05 NOTE — PROGRESS NOTES
Pt here for C 7 D 1 Taxol.   Arrives Ambulating independently, accompanied by Self           Pregnancy screening: Not applicable    Modifications in dose or schedule: No     Frequency of blood return and site check throughout administration: Prior to admini

## 2021-11-10 RX ORDER — ESOMEPRAZOLE MAGNESIUM 40 MG/1
40 CAPSULE, DELAYED RELEASE ORAL
Qty: 90 CAPSULE | Refills: 1 | Status: SHIPPED | OUTPATIENT
Start: 2021-11-10

## 2021-11-10 RX ORDER — ESOMEPRAZOLE MAGNESIUM 40 MG/1
40 CAPSULE, DELAYED RELEASE ORAL
Qty: 90 CAPSULE | Refills: 1 | OUTPATIENT
Start: 2021-11-10

## 2021-11-11 ENCOUNTER — TELEPHONE (OUTPATIENT)
Dept: PHYSICAL THERAPY | Facility: HOSPITAL | Age: 51
End: 2021-11-11

## 2021-11-12 ENCOUNTER — OFFICE VISIT (OUTPATIENT)
Dept: HEMATOLOGY/ONCOLOGY | Facility: HOSPITAL | Age: 51
End: 2021-11-12
Attending: INTERNAL MEDICINE
Payer: COMMERCIAL

## 2021-11-12 VITALS
HEART RATE: 106 BPM | TEMPERATURE: 98 F | RESPIRATION RATE: 16 BRPM | OXYGEN SATURATION: 99 % | SYSTOLIC BLOOD PRESSURE: 102 MMHG | DIASTOLIC BLOOD PRESSURE: 72 MMHG | WEIGHT: 165 LBS | BODY MASS INDEX: 33.26 KG/M2 | HEIGHT: 59.21 IN

## 2021-11-12 DIAGNOSIS — Z17.0 MALIGNANT NEOPLASM OF OVERLAPPING SITES OF LEFT BREAST IN FEMALE, ESTROGEN RECEPTOR POSITIVE (HCC): Primary | ICD-10-CM

## 2021-11-12 DIAGNOSIS — C50.812 MALIGNANT NEOPLASM OF OVERLAPPING SITES OF LEFT BREAST IN FEMALE, ESTROGEN RECEPTOR POSITIVE (HCC): Primary | ICD-10-CM

## 2021-11-12 DIAGNOSIS — C77.9 REGIONAL LYMPH NODE METASTASIS PRESENT (HCC): ICD-10-CM

## 2021-11-12 PROCEDURE — S0028 INJECTION, FAMOTIDINE, 20 MG: HCPCS | Performed by: INTERNAL MEDICINE

## 2021-11-12 PROCEDURE — 96413 CHEMO IV INFUSION 1 HR: CPT

## 2021-11-12 PROCEDURE — 96375 TX/PRO/DX INJ NEW DRUG ADDON: CPT

## 2021-11-12 PROCEDURE — 85025 COMPLETE CBC W/AUTO DIFF WBC: CPT

## 2021-11-12 RX ORDER — DIPHENHYDRAMINE HYDROCHLORIDE 50 MG/ML
25 INJECTION INTRAMUSCULAR; INTRAVENOUS ONCE
Status: COMPLETED | OUTPATIENT
Start: 2021-11-12 | End: 2021-11-12

## 2021-11-12 RX ORDER — FAMOTIDINE 10 MG/ML
20 INJECTION, SOLUTION INTRAVENOUS ONCE
Status: COMPLETED | OUTPATIENT
Start: 2021-11-12 | End: 2021-11-12

## 2021-11-12 RX ADMIN — DIPHENHYDRAMINE HYDROCHLORIDE 25 MG: 50 INJECTION INTRAMUSCULAR; INTRAVENOUS at 13:06:00

## 2021-11-12 RX ADMIN — FAMOTIDINE 20 MG: 10 INJECTION, SOLUTION INTRAVENOUS at 13:06:00

## 2021-11-12 NOTE — PROGRESS NOTES
Pt here for C7D8 Taxol.   Arrives Ambulating independently, accompanied by Self           Pregnancy screening: Denies possibility of pregnancy    Modifications in dose or schedule: no     Frequency of blood return and site check throughout administration: P

## 2021-11-16 ENCOUNTER — OFFICE VISIT (OUTPATIENT)
Dept: OCCUPATIONAL MEDICINE | Facility: HOSPITAL | Age: 51
End: 2021-11-16
Attending: INTERNAL MEDICINE
Payer: COMMERCIAL

## 2021-11-16 DIAGNOSIS — C77.9 SECONDARY MALIGNANT NEOPLASM OF LYMPH NODES (HCC): ICD-10-CM

## 2021-11-16 DIAGNOSIS — Z17.0 ESTROGEN RECEPTOR POSITIVE STATUS (ER+): ICD-10-CM

## 2021-11-16 DIAGNOSIS — C50.812 MALIGNANT NEOPLASM OF OVERLAPPING SITES OF LEFT FEMALE BREAST (HCC): ICD-10-CM

## 2021-11-16 PROCEDURE — 97165 OT EVAL LOW COMPLEX 30 MIN: CPT

## 2021-11-16 NOTE — PROGRESS NOTES
UE LYMPHEDEMA EVALUATION:   Referring Physician: Dr. 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  Skilled Occupational Therapy is medically necessary for  Complete Decongestive Therapy to reduce swelling and decrease risk of infection, STM, ROM, stretching, garment pr Initial evaluation      Charges: Occupational Therapy Eval: Low Complexity   Total Timed Treatment: 55 min     Total Treatment Time: 60 min     Based on clinical rationale and outcome measures, this evaluation involved Low Complexity decision making due treatment and while under my care.     X___________________________________________________ Date____________________    Certification From: 56/41/1811  To:2/14/2022

## 2021-11-17 NOTE — PROGRESS NOTES
[facility] Progress Note      Patient Name:  Yohana Santiago  YOB: 1970  Medical Record Number:  [id number]    Date of visit: 11/19/2021      CHIEF COMPLAINT: L breast ca s/p lumpectomy.     HPI:     46year old that I initially saw i Oral Tab Take 0.25 mg by mouth. • Beclomethasone Diprop HFA (QVAR REDIHALER) 80 MCG/ACT Inhalation Aerosol, Breath Activated Inhale 2 puffs into the lungs 2 (two) times daily.      • Fluticasone Propionate 50 MCG/ACT Nasal Suspension 2 sprays by Nasal r Invasive tumor 100% ER, 80% TN, HER-2 negative, Ki-67 40%. Started adjuvant chemotherapy with chemo with DD AC-weekly Paclitaxel on 7/28/2021. Started weekly paclitaxel last week, tolerating with mild side effects. Proceed with week # 9/12.      After co

## 2021-11-19 ENCOUNTER — OFFICE VISIT (OUTPATIENT)
Dept: HEMATOLOGY/ONCOLOGY | Facility: HOSPITAL | Age: 51
End: 2021-11-19
Attending: INTERNAL MEDICINE
Payer: COMMERCIAL

## 2021-11-19 VITALS
HEART RATE: 92 BPM | WEIGHT: 170 LBS | SYSTOLIC BLOOD PRESSURE: 142 MMHG | BODY MASS INDEX: 34.27 KG/M2 | HEIGHT: 59.21 IN | TEMPERATURE: 98 F | RESPIRATION RATE: 16 BRPM | DIASTOLIC BLOOD PRESSURE: 89 MMHG

## 2021-11-19 DIAGNOSIS — D64.9 ANEMIA, UNSPECIFIED TYPE: ICD-10-CM

## 2021-11-19 DIAGNOSIS — C50.812 MALIGNANT NEOPLASM OF OVERLAPPING SITES OF LEFT BREAST IN FEMALE, ESTROGEN RECEPTOR POSITIVE (HCC): Primary | ICD-10-CM

## 2021-11-19 DIAGNOSIS — C77.9 REGIONAL LYMPH NODE METASTASIS PRESENT (HCC): ICD-10-CM

## 2021-11-19 DIAGNOSIS — R11.2 CINV (CHEMOTHERAPY-INDUCED NAUSEA AND VOMITING): ICD-10-CM

## 2021-11-19 DIAGNOSIS — T45.1X5A CINV (CHEMOTHERAPY-INDUCED NAUSEA AND VOMITING): ICD-10-CM

## 2021-11-19 DIAGNOSIS — Z17.0 MALIGNANT NEOPLASM OF OVERLAPPING SITES OF LEFT BREAST IN FEMALE, ESTROGEN RECEPTOR POSITIVE (HCC): Primary | ICD-10-CM

## 2021-11-19 DIAGNOSIS — T45.1X5D ADVERSE EFFECT OF CHEMOTHERAPY, SUBSEQUENT ENCOUNTER: ICD-10-CM

## 2021-11-19 PROCEDURE — 85025 COMPLETE CBC W/AUTO DIFF WBC: CPT

## 2021-11-19 PROCEDURE — 96375 TX/PRO/DX INJ NEW DRUG ADDON: CPT

## 2021-11-19 PROCEDURE — S0028 INJECTION, FAMOTIDINE, 20 MG: HCPCS | Performed by: INTERNAL MEDICINE

## 2021-11-19 PROCEDURE — 96413 CHEMO IV INFUSION 1 HR: CPT

## 2021-11-19 PROCEDURE — 99215 OFFICE O/P EST HI 40 MIN: CPT | Performed by: INTERNAL MEDICINE

## 2021-11-19 RX ORDER — FAMOTIDINE 10 MG/ML
20 INJECTION, SOLUTION INTRAVENOUS ONCE
Status: CANCELLED | OUTPATIENT
Start: 2021-11-26

## 2021-11-19 RX ORDER — DIPHENHYDRAMINE HYDROCHLORIDE 50 MG/ML
25 INJECTION INTRAMUSCULAR; INTRAVENOUS ONCE
Status: CANCELLED | OUTPATIENT
Start: 2021-11-19

## 2021-11-19 RX ORDER — FAMOTIDINE 10 MG/ML
20 INJECTION, SOLUTION INTRAVENOUS ONCE
Status: COMPLETED | OUTPATIENT
Start: 2021-11-19 | End: 2021-11-19

## 2021-11-19 RX ORDER — FAMOTIDINE 10 MG/ML
20 INJECTION, SOLUTION INTRAVENOUS ONCE
Status: CANCELLED | OUTPATIENT
Start: 2021-11-19

## 2021-11-19 RX ORDER — DIPHENHYDRAMINE HYDROCHLORIDE 50 MG/ML
25 INJECTION INTRAMUSCULAR; INTRAVENOUS ONCE
Status: COMPLETED | OUTPATIENT
Start: 2021-11-19 | End: 2021-11-19

## 2021-11-19 RX ORDER — DIPHENHYDRAMINE HYDROCHLORIDE 50 MG/ML
25 INJECTION INTRAMUSCULAR; INTRAVENOUS ONCE
Status: CANCELLED | OUTPATIENT
Start: 2021-11-26

## 2021-11-19 RX ADMIN — DIPHENHYDRAMINE HYDROCHLORIDE 25 MG: 50 INJECTION INTRAMUSCULAR; INTRAVENOUS at 13:00:00

## 2021-11-19 RX ADMIN — FAMOTIDINE 20 MG: 10 INJECTION, SOLUTION INTRAVENOUS at 13:01:00

## 2021-11-19 NOTE — PROGRESS NOTES
Education Record    Learner:  Patient    Disease / Diagnosis:braest cancer  OTV D15 C7 Taxol    Barriers / Limitations:  None   Comments:    Method:  Discussion   Comments:    General Topics:  Plan of care reviewed   Comments:    Outcome:  Shows understand

## 2021-11-19 NOTE — PROGRESS NOTES
Pt here for C 7 D 15 .   Arrives Ambulating independently, accompanied by Self           Pregnancy screening: Denies possibility of pregnancy    Modifications in dose or schedule: No     Frequency of blood return and site check throughout administration: Pr

## 2021-11-22 ENCOUNTER — OFFICE VISIT (OUTPATIENT)
Dept: OCCUPATIONAL MEDICINE | Facility: HOSPITAL | Age: 51
End: 2021-11-22
Attending: INTERNAL MEDICINE
Payer: COMMERCIAL

## 2021-11-22 PROCEDURE — 97140 MANUAL THERAPY 1/> REGIONS: CPT

## 2021-11-23 NOTE — PROGRESS NOTES
Dx:  Malignant neoplasm of overlapping sites of left female breast [facility]) (L58.811)  [facility] (C77.9)       Insurance (Authorized # of Visits): 2/18-21   Next MD/Plan Renewal Date:  ?/ 2/14/22  Authorizing Physician:

## 2021-11-24 ENCOUNTER — TELEPHONE (OUTPATIENT)
Dept: INTERNAL MEDICINE | Age: 51
End: 2021-11-24

## 2021-11-24 ENCOUNTER — OFFICE VISIT (OUTPATIENT)
Dept: OCCUPATIONAL MEDICINE | Facility: HOSPITAL | Age: 51
End: 2021-11-24
Attending: INTERNAL MEDICINE
Payer: COMMERCIAL

## 2021-11-24 PROCEDURE — 97140 MANUAL THERAPY 1/> REGIONS: CPT

## 2021-11-24 NOTE — PROGRESS NOTES
Dx:  Malignant neoplasm of overlapping sites of left female breast [facility]) (C96.418)  [facility] (C77.9)       Insurance (Authorized # of Visits): 3/18-21   Next MD/Plan Renewal Date:  ?/ 2/14/22  Authorizing Physician: [email or web address].       Plan:   Continue per OT  POC       Charges: 4x MT   Total Timed Treatment: 55 min  Total Treatment Time: 60 min

## 2021-11-26 ENCOUNTER — OFFICE VISIT (OUTPATIENT)
Dept: HEMATOLOGY/ONCOLOGY | Facility: HOSPITAL | Age: 51
End: 2021-11-26
Attending: INTERNAL MEDICINE
Payer: COMMERCIAL

## 2021-11-26 VITALS
SYSTOLIC BLOOD PRESSURE: 137 MMHG | DIASTOLIC BLOOD PRESSURE: 86 MMHG | OXYGEN SATURATION: 99 % | HEIGHT: 59.21 IN | TEMPERATURE: 98 F | RESPIRATION RATE: 16 BRPM | HEART RATE: 98 BPM | BODY MASS INDEX: 34.31 KG/M2 | WEIGHT: 170.19 LBS

## 2021-11-26 DIAGNOSIS — C50.812 MALIGNANT NEOPLASM OF OVERLAPPING SITES OF LEFT BREAST IN FEMALE, ESTROGEN RECEPTOR POSITIVE (HCC): Primary | ICD-10-CM

## 2021-11-26 DIAGNOSIS — Z17.0 MALIGNANT NEOPLASM OF OVERLAPPING SITES OF LEFT BREAST IN FEMALE, ESTROGEN RECEPTOR POSITIVE (HCC): Primary | ICD-10-CM

## 2021-11-26 DIAGNOSIS — C50.812 MALIGNANT NEOPLASM OF OVERLAPPING SITES OF LEFT BREAST IN FEMALE, ESTROGEN RECEPTOR POSITIVE (HCC): ICD-10-CM

## 2021-11-26 DIAGNOSIS — Z17.0 MALIGNANT NEOPLASM OF OVERLAPPING SITES OF LEFT BREAST IN FEMALE, ESTROGEN RECEPTOR POSITIVE (HCC): ICD-10-CM

## 2021-11-26 DIAGNOSIS — C77.9 REGIONAL LYMPH NODE METASTASIS PRESENT (HCC): ICD-10-CM

## 2021-11-26 PROCEDURE — 80053 COMPREHEN METABOLIC PANEL: CPT

## 2021-11-26 PROCEDURE — 96375 TX/PRO/DX INJ NEW DRUG ADDON: CPT

## 2021-11-26 PROCEDURE — 96413 CHEMO IV INFUSION 1 HR: CPT

## 2021-11-26 PROCEDURE — S0028 INJECTION, FAMOTIDINE, 20 MG: HCPCS | Performed by: INTERNAL MEDICINE

## 2021-11-26 PROCEDURE — 85025 COMPLETE CBC W/AUTO DIFF WBC: CPT

## 2021-11-26 RX ORDER — ONDANSETRON HYDROCHLORIDE 8 MG/1
8 TABLET, FILM COATED ORAL EVERY 8 HOURS PRN
Qty: 30 TABLET | Refills: 3 | Status: SHIPPED | OUTPATIENT
Start: 2021-11-26

## 2021-11-26 RX ORDER — FAMOTIDINE 10 MG/ML
20 INJECTION, SOLUTION INTRAVENOUS ONCE
Status: COMPLETED | OUTPATIENT
Start: 2021-11-26 | End: 2021-11-26

## 2021-11-26 RX ORDER — DIPHENHYDRAMINE HYDROCHLORIDE 50 MG/ML
25 INJECTION INTRAMUSCULAR; INTRAVENOUS ONCE
Status: COMPLETED | OUTPATIENT
Start: 2021-11-26 | End: 2021-11-26

## 2021-11-26 RX ORDER — LOSARTAN POTASSIUM 50 MG/1
TABLET ORAL
Qty: 45 TABLET | Refills: 0 | Status: SHIPPED | COMMUNITY
Start: 2021-11-26 | End: 2022-01-10 | Stop reason: SDUPTHER

## 2021-11-26 RX ADMIN — DIPHENHYDRAMINE HYDROCHLORIDE 25 MG: 50 INJECTION INTRAMUSCULAR; INTRAVENOUS at 11:22:00

## 2021-11-26 RX ADMIN — FAMOTIDINE 20 MG: 10 INJECTION, SOLUTION INTRAVENOUS at 11:26:00

## 2021-11-26 NOTE — PROGRESS NOTES
Pt here for C8D1 Taxol.   Arrives Ambulating independently, accompanied by Self           Pregnancy screening: Not applicable    Modifications in dose or schedule: No     Frequency of blood return and site check throughout administration: Prior to [employer]

## 2021-11-29 ENCOUNTER — OFFICE VISIT (OUTPATIENT)
Dept: OCCUPATIONAL MEDICINE | Facility: HOSPITAL | Age: 51
End: 2021-11-29
Attending: INTERNAL MEDICINE
Payer: COMMERCIAL

## 2021-11-29 PROCEDURE — 97140 MANUAL THERAPY 1/> REGIONS: CPT

## 2021-11-30 NOTE — PROGRESS NOTES
Dx:  Malignant neoplasm of overlapping sites of left female breast [facility]) (S21.211)  [facility] (C77.9)       Insurance (Authorized # of Visits): 4/18-21   Next MD/Plan Renewal Date:  ?/ 2/14/22  Authorizing Physician: self-management of lymphedema.       Plan:   Continue per OT  POC       Charges: 4x MT   Total Timed Treatment: 55 min  Total Treatment Time: 60 min

## 2021-12-02 NOTE — PROGRESS NOTES
[facility] Progress Note      Patient Name:  Radha Washburn  YOB: 1970  Medical Record Number:  [id number]    Date of visit: 12/3/2021    CHIEF COMPLAINT: L breast ca s/p lumpectomy.     HPI:     46year old that I initially saw in 9 the lungs. • ALPRAZolam 0.25 MG Oral Tab Take 0.25 mg by mouth. • Beclomethasone Diprop HFA (QVAR REDIHALER) 80 MCG/ACT Inhalation Aerosol, Breath Activated Inhale 2 puffs into the lungs 2 (two) times daily.      • Fluticasone Propionate 50 MCG/ACT - 11.0 x10(3) uL    RBC 3.03 (L) 3.80 - 5.30 x10(6)uL    HGB 9.6 (L) 12.0 - 16.0 g/dL    HCT 28.6 (L) 35.0 - 48.0 %    .0 150.0 - 450.0 10(3)uL    MCV 94.4 80.0 - 100.0 fL    MCH 31.7 26.0 - 34.0 pg    MCHC 33.6 31.0 - 37.0 g/dL    RDW 15.0 %    El survivorship. ORDERS PLACED:        CT CHEST+ABDOMEN+PELVIS(CPT=71250/13078)    Return for Labs, Chemo 1 week. Port flush end Jan. Labs and MD after RT complete. Stefano Sinclair M.D.     [facility] Hematology Oncology [address] [place] [address]

## 2021-12-03 ENCOUNTER — OFFICE VISIT (OUTPATIENT)
Dept: HEMATOLOGY/ONCOLOGY | Facility: HOSPITAL | Age: 51
End: 2021-12-03
Attending: INTERNAL MEDICINE
Payer: COMMERCIAL

## 2021-12-03 VITALS
WEIGHT: 167.63 LBS | RESPIRATION RATE: 16 BRPM | HEIGHT: 59.21 IN | SYSTOLIC BLOOD PRESSURE: 139 MMHG | HEART RATE: 92 BPM | OXYGEN SATURATION: 95 % | DIASTOLIC BLOOD PRESSURE: 93 MMHG | TEMPERATURE: 97 F | BODY MASS INDEX: 33.8 KG/M2

## 2021-12-03 DIAGNOSIS — Z17.0 MALIGNANT NEOPLASM OF OVERLAPPING SITES OF LEFT BREAST IN FEMALE, ESTROGEN RECEPTOR POSITIVE (HCC): Primary | ICD-10-CM

## 2021-12-03 DIAGNOSIS — C77.9 REGIONAL LYMPH NODE METASTASIS PRESENT (HCC): ICD-10-CM

## 2021-12-03 DIAGNOSIS — T45.1X5A CHEMOTHERAPY-INDUCED NEUROPATHY (HCC): ICD-10-CM

## 2021-12-03 DIAGNOSIS — C50.812 MALIGNANT NEOPLASM OF OVERLAPPING SITES OF LEFT BREAST IN FEMALE, ESTROGEN RECEPTOR POSITIVE (HCC): Primary | ICD-10-CM

## 2021-12-03 DIAGNOSIS — G62.0 CHEMOTHERAPY-INDUCED NEUROPATHY (HCC): ICD-10-CM

## 2021-12-03 DIAGNOSIS — K76.9 LIVER LESION: ICD-10-CM

## 2021-12-03 DIAGNOSIS — D64.81 ANTINEOPLASTIC CHEMOTHERAPY INDUCED ANEMIA: ICD-10-CM

## 2021-12-03 DIAGNOSIS — T45.1X5A ANTINEOPLASTIC CHEMOTHERAPY INDUCED ANEMIA: ICD-10-CM

## 2021-12-03 PROCEDURE — 96413 CHEMO IV INFUSION 1 HR: CPT

## 2021-12-03 PROCEDURE — 99215 OFFICE O/P EST HI 40 MIN: CPT | Performed by: INTERNAL MEDICINE

## 2021-12-03 PROCEDURE — 85025 COMPLETE CBC W/AUTO DIFF WBC: CPT

## 2021-12-03 PROCEDURE — 96375 TX/PRO/DX INJ NEW DRUG ADDON: CPT

## 2021-12-03 PROCEDURE — S0028 INJECTION, FAMOTIDINE, 20 MG: HCPCS | Performed by: INTERNAL MEDICINE

## 2021-12-03 RX ORDER — DIPHENHYDRAMINE HYDROCHLORIDE 50 MG/ML
25 INJECTION INTRAMUSCULAR; INTRAVENOUS ONCE
Status: COMPLETED | OUTPATIENT
Start: 2021-12-03 | End: 2021-12-03

## 2021-12-03 RX ORDER — FAMOTIDINE 10 MG/ML
20 INJECTION, SOLUTION INTRAVENOUS ONCE
Status: COMPLETED | OUTPATIENT
Start: 2021-12-03 | End: 2021-12-03

## 2021-12-03 RX ORDER — FAMOTIDINE 10 MG/ML
20 INJECTION, SOLUTION INTRAVENOUS ONCE
Status: CANCELLED | OUTPATIENT
Start: 2021-12-10

## 2021-12-03 RX ORDER — DIPHENHYDRAMINE HYDROCHLORIDE 50 MG/ML
25 INJECTION INTRAMUSCULAR; INTRAVENOUS ONCE
Status: CANCELLED | OUTPATIENT
Start: 2021-12-10

## 2021-12-03 RX ADMIN — FAMOTIDINE 20 MG: 10 INJECTION, SOLUTION INTRAVENOUS at 13:12:00

## 2021-12-03 RX ADMIN — DIPHENHYDRAMINE HYDROCHLORIDE 25 MG: 50 INJECTION INTRAMUSCULAR; INTRAVENOUS at 13:12:00

## 2021-12-03 NOTE — PROGRESS NOTES
Education Record    Learner:  Patient    Disease / Melisa billingsley  OTV D8C8 Taxol.        Barriers / Limitations:  None   Comments:    Method:  Discussion   Comments:    General Topics:  Plan of care reviewed   Comments:    Outcome:  Shows understandin

## 2021-12-03 NOTE — PROGRESS NOTES
Pt here for C 8 D 8 .   Arrives Ambulating independently, accompanied by Self           Pregnancy screening: Not applicable    Modifications in dose or schedule: No     Frequency of blood return and site check throughout administration: Prior to administrat

## 2021-12-08 ENCOUNTER — OFFICE VISIT (OUTPATIENT)
Dept: OCCUPATIONAL MEDICINE | Facility: HOSPITAL | Age: 51
End: 2021-12-08
Attending: INTERNAL MEDICINE
Payer: COMMERCIAL

## 2021-12-08 PROBLEM — I89.0 LYMPHEDEMA OF LEFT ARM: Status: ACTIVE | Noted: 2021-12-08

## 2021-12-08 PROCEDURE — 97140 MANUAL THERAPY 1/> REGIONS: CPT

## 2021-12-09 NOTE — PROGRESS NOTES
Dx:  Malignant neoplasm of overlapping sites of left female breast 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 and to reduce pt's infection risk.   5- Pt will be independent in use of compression garments, self-manual lymph drainage, decongestive exercises forLUE and lymphedema precautions for life-long self-manag

## 2021-12-09 NOTE — PROGRESS NOTES
Nursing Consultation Note  Patient: Shama Conner  YOB: 1970  Age: 46year old  Radiation Oncologist: Dr. Ayla Espinosa  Referring Physician: [email or web address]  Consult Date: 12/9/2021      Chemotherapy: Last chemo today oozes  Erythromycin            NAUSEA AND VOMITING  Dust Mites              UNKNOWN  Mold                    UNKNOWN    Current Outpatient Medications   Medication Sig Dispense Refill   • Esomeprazole Magnesium 40 MG Oral Capsule Delayed Release Take 1 cap 12/10/2021) 30 tablet 3       Preferred Pharmacy:    [facility] [address], [address] AT [place] [address], [phone], [phone]  [address] [place] [address]  Cb Georges  Phone: [phone] Fax: [phone] therapy    Interventions:  Assess patient knowledge level    Expected Outcomes:  Knowledge of radiation therapy     Progress Toward Outcome:  Making progress    Pamphlets/Handouts Given to Patient:  Understanding radiation therapy  Radiation therapy sympto

## 2021-12-10 ENCOUNTER — OFFICE VISIT (OUTPATIENT)
Dept: HEMATOLOGY/ONCOLOGY | Facility: HOSPITAL | Age: 51
End: 2021-12-10
Attending: INTERNAL MEDICINE
Payer: COMMERCIAL

## 2021-12-10 ENCOUNTER — EXTERNAL RECORD (OUTPATIENT)
Dept: HEALTH INFORMATION MANAGEMENT | Facility: OTHER | Age: 51
End: 2021-12-10

## 2021-12-10 ENCOUNTER — HOSPITAL ENCOUNTER (OUTPATIENT)
Dept: RADIATION ONCOLOGY | Facility: HOSPITAL | Age: 51
Discharge: HOME | End: 2021-12-10
Attending: RADIOLOGY
Payer: COMMERCIAL

## 2021-12-10 VITALS
BODY MASS INDEX: 32.86 KG/M2 | WEIGHT: 167.38 LBS | HEIGHT: 60 IN | HEART RATE: 98 BPM | OXYGEN SATURATION: 100 % | DIASTOLIC BLOOD PRESSURE: 84 MMHG | RESPIRATION RATE: 18 BRPM | SYSTOLIC BLOOD PRESSURE: 120 MMHG | TEMPERATURE: 98 F

## 2021-12-10 VITALS
TEMPERATURE: 98 F | WEIGHT: 169 LBS | SYSTOLIC BLOOD PRESSURE: 128 MMHG | OXYGEN SATURATION: 98 % | BODY MASS INDEX: 33.18 KG/M2 | RESPIRATION RATE: 16 BRPM | HEIGHT: 60 IN | DIASTOLIC BLOOD PRESSURE: 91 MMHG | HEART RATE: 95 BPM

## 2021-12-10 DIAGNOSIS — C77.9 REGIONAL LYMPH NODE METASTASIS PRESENT (HCC): ICD-10-CM

## 2021-12-10 DIAGNOSIS — Z17.0 MALIGNANT NEOPLASM OF OVERLAPPING SITES OF LEFT BREAST IN FEMALE, ESTROGEN RECEPTOR POSITIVE (HCC): Primary | ICD-10-CM

## 2021-12-10 DIAGNOSIS — C50.812 MALIGNANT NEOPLASM OF OVERLAPPING SITES OF LEFT BREAST IN FEMALE, ESTROGEN RECEPTOR POSITIVE (HCC): Primary | ICD-10-CM

## 2021-12-10 PROCEDURE — 85025 COMPLETE CBC W/AUTO DIFF WBC: CPT

## 2021-12-10 PROCEDURE — S0028 INJECTION, FAMOTIDINE, 20 MG: HCPCS | Performed by: INTERNAL MEDICINE

## 2021-12-10 PROCEDURE — 99214 OFFICE O/P EST MOD 30 MIN: CPT

## 2021-12-10 PROCEDURE — 96375 TX/PRO/DX INJ NEW DRUG ADDON: CPT

## 2021-12-10 PROCEDURE — 96413 CHEMO IV INFUSION 1 HR: CPT

## 2021-12-10 RX ORDER — FAMOTIDINE 10 MG/ML
20 INJECTION, SOLUTION INTRAVENOUS ONCE
Status: COMPLETED | OUTPATIENT
Start: 2021-12-10 | End: 2021-12-10

## 2021-12-10 RX ORDER — DIPHENHYDRAMINE HYDROCHLORIDE 50 MG/ML
25 INJECTION INTRAMUSCULAR; INTRAVENOUS ONCE
Status: COMPLETED | OUTPATIENT
Start: 2021-12-10 | End: 2021-12-10

## 2021-12-10 RX ADMIN — FAMOTIDINE 20 MG: 10 INJECTION, SOLUTION INTRAVENOUS at 14:01:00

## 2021-12-10 RX ADMIN — DIPHENHYDRAMINE HYDROCHLORIDE 25 MG: 50 INJECTION INTRAMUSCULAR; INTRAVENOUS at 14:01:00

## 2021-12-10 NOTE — PROGRESS NOTES
Pt here for C 8 D 15.   Arrives Ambulating independently, accompanied by Self           Pregnancy screening: Not applicable    Modifications in dose or schedule: No     Frequency of blood return and site check throughout administration: Prior to administrat

## 2021-12-10 NOTE — CONSULTS
[address]    PATIENT'S NAME: Shekhar Zach   RADIATION ONCOLOGIST: Israel Oglesby M.D.    PATIENT ACCOUNT #: [de-identified] [facility]   MEDICAL RECORD #: [id number] YOB: 1970   CONSULTATION DATE: 12/10/2021       RADIATION She is receiving her final treatment today. She has tolerated this reasonably well overall, but has begun to have some neuropathy of late. Her nausea and vomiting have largely subsided, but her energy level continues to be low.   She otherwise is doing r weight of 167 pounds. HEENT:  Pupils are equal, round, reactive to light with accommodation, and the extraocular movements are intact. The oral cavity is without ulcerations or lesions. NECK:  Supple with no lymphadenopathy.    LUNGS:  Clear to auscultat 1000 cGy in 200 cGy fractions. I am optimistic that with these treatments, we will decrease her likelihood for local or regional failure. I then had a long talk with the patient regarding the potential side effects of irradiation.   I told her that she

## 2021-12-10 NOTE — PATIENT INSTRUCTIONS
OUR RADIATION THERAPISTS WILL CALL YOU TO SCHEDULE YOUR CT SIMULATION IN OUR OFFICE.     PLEASE CALL WITH ANY QUESTIONS OR CONCERNS [phone]    CENTRAL SCHEDULING [phone]

## 2021-12-13 ENCOUNTER — OFFICE VISIT (OUTPATIENT)
Dept: OCCUPATIONAL MEDICINE | Facility: HOSPITAL | Age: 51
End: 2021-12-13
Attending: INTERNAL MEDICINE
Payer: COMMERCIAL

## 2021-12-13 PROCEDURE — 97140 MANUAL THERAPY 1/> REGIONS: CPT

## 2021-12-14 NOTE — PROGRESS NOTES
Dx:  Malignant neoplasm of overlapping sites of left female breast [facility]) (W79.970)  Secondary [facility] lymph [facility] ([facility] (C77.9)       Insurance (Authorized # of Visits): 6/18-21   Next MD/Plan Renewal Date:  ?/ 2/14/22  Authorizing Physician: visits)  1- Pt will tolerate L UE light compression via Tensogrip sleeve for 18-23 hours. 2-  Pt will be independent in decongestive exercises. 3-  Pt will be independent in self-manual lymph drainage.   4-  Reduce L UE/axilla/UQ lymphedema volume by 8 cm

## 2021-12-15 ENCOUNTER — OFFICE VISIT (OUTPATIENT)
Dept: OCCUPATIONAL MEDICINE | Facility: HOSPITAL | Age: 51
End: 2021-12-15
Attending: INTERNAL MEDICINE
Payer: COMMERCIAL

## 2021-12-15 PROCEDURE — 97140 MANUAL THERAPY 1/> REGIONS: CPT

## 2021-12-16 ENCOUNTER — HOSPITAL ENCOUNTER (OUTPATIENT)
Dept: RADIATION ONCOLOGY | Facility: HOSPITAL | Age: 51
Discharge: HOME | End: 2021-12-16
Attending: RADIOLOGY
Payer: COMMERCIAL

## 2021-12-16 PROCEDURE — 77334 RADIATION TREATMENT AID(S): CPT | Performed by: RADIOLOGY

## 2021-12-16 PROCEDURE — 77290 THER RAD SIMULAJ FIELD CPLX: CPT | Performed by: RADIOLOGY

## 2021-12-16 NOTE — PROGRESS NOTES
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 No significant tissue changes today. Abnormal tissue (cord) in L axilla moving distally to superior/lateral aspect. Tender to deep palpation. Becoming more mobile by end of tx.

## 2021-12-17 ENCOUNTER — TELEPHONE (OUTPATIENT)
Dept: SURGERY | Age: 51
End: 2021-12-17

## 2021-12-17 PROCEDURE — 77470 SPECIAL RADIATION TREATMENT: CPT | Performed by: RADIOLOGY

## 2021-12-18 ENCOUNTER — EXTERNAL RECORD (OUTPATIENT)
Dept: HEALTH INFORMATION MANAGEMENT | Facility: OTHER | Age: 51
End: 2021-12-18

## 2021-12-18 ENCOUNTER — IMAGING SERVICES (OUTPATIENT)
Dept: OTHER | Age: 51
End: 2021-12-18

## 2021-12-18 ENCOUNTER — HOSPITAL ENCOUNTER (OUTPATIENT)
Dept: CT IMAGING | Facility: HOSPITAL | Age: 51
Discharge: HOME | End: 2021-12-18
Attending: INTERNAL MEDICINE
Payer: COMMERCIAL

## 2021-12-18 DIAGNOSIS — K76.9 LIVER LESION: ICD-10-CM

## 2021-12-18 DIAGNOSIS — C50.812 MALIGNANT NEOPLASM OF OVERLAPPING SITES OF LEFT BREAST IN FEMALE, ESTROGEN RECEPTOR POSITIVE (HCC): ICD-10-CM

## 2021-12-18 DIAGNOSIS — C77.9 REGIONAL LYMPH NODE METASTASIS PRESENT (HCC): ICD-10-CM

## 2021-12-18 DIAGNOSIS — Z17.0 MALIGNANT NEOPLASM OF OVERLAPPING SITES OF LEFT BREAST IN FEMALE, ESTROGEN RECEPTOR POSITIVE (HCC): ICD-10-CM

## 2021-12-18 PROCEDURE — 71250 CT THORAX DX C-: CPT | Performed by: INTERNAL MEDICINE

## 2021-12-18 PROCEDURE — 74176 CT ABD & PELVIS W/O CONTRAST: CPT | Performed by: INTERNAL MEDICINE

## 2021-12-20 ENCOUNTER — APPOINTMENT (OUTPATIENT)
Dept: SURGERY | Age: 51
End: 2021-12-20

## 2021-12-20 ENCOUNTER — OFFICE VISIT (OUTPATIENT)
Dept: OCCUPATIONAL MEDICINE | Facility: HOSPITAL | Age: 51
End: 2021-12-20
Attending: INTERNAL MEDICINE
Payer: COMMERCIAL

## 2021-12-20 PROCEDURE — 97140 MANUAL THERAPY 1/> REGIONS: CPT

## 2021-12-21 ENCOUNTER — APPOINTMENT (OUTPATIENT)
Dept: MAMMOGRAPHY | Age: 51
End: 2021-12-21
Attending: SURGERY

## 2021-12-21 NOTE — PROGRESS NOTES
Dx:  Malignant neoplasm of overlapping sites of left female breast [facility]) (B56.239)  Secondary malignant [facility] lymph nodes [facility] (C77.9)       Insurance (Authorized # of Visits): 8/18-21   Next MD/Plan Renewal Date:  ?/ 2/14/22  Authorizing Physician: endurance remains low today. No significant tissue changes today. Abnormal tissue (cord) in L axilla moving distally to superior/lateral aspect. Tender to deep palpation. Becoming more mobile by end of tx. WNL LUE AROM for functional reaching.     Goal

## 2021-12-22 ENCOUNTER — OFFICE VISIT (OUTPATIENT)
Dept: OCCUPATIONAL MEDICINE | Facility: HOSPITAL | Age: 51
End: 2021-12-22
Attending: INTERNAL MEDICINE
Payer: COMMERCIAL

## 2021-12-22 PROCEDURE — 97140 MANUAL THERAPY 1/> REGIONS: CPT

## 2021-12-22 NOTE — PROGRESS NOTES
Dx:  Malignant neoplasm of overlapping sites of left female breast [facility]) (K24.709)  Secondary malignant neoplasm of lymph nodes ([facility] (C77.9)       Insurance (Authorized # of Visits): 9/18-21   Next MD/Plan Renewal Date:  ?/ 2/14/22  Authorizing Physician: (cord) in L axilla moving distally to superior/lateral aspect. Tender to deep palpation. Becoming more mobile by end of tx. WNL LUE AROM for functional reaching.     Goals:  (to be met in 18-21 visits)  1- Pt will tolerate L UE light compression via Tenso

## 2021-12-23 ENCOUNTER — IMAGING SERVICES (OUTPATIENT)
Dept: MAMMOGRAPHY | Age: 51
End: 2021-12-23
Attending: SURGERY

## 2021-12-23 ENCOUNTER — TELEPHONE (OUTPATIENT)
Dept: PAIN MANAGEMENT | Age: 51
End: 2021-12-23

## 2021-12-23 DIAGNOSIS — C50.912 CARCINOMA OF LEFT BREAST METASTATIC TO AXILLARY LYMPH NODE (CMD): ICD-10-CM

## 2021-12-23 DIAGNOSIS — C77.3 CARCINOMA OF LEFT BREAST METASTATIC TO AXILLARY LYMPH NODE (CMD): ICD-10-CM

## 2021-12-23 DIAGNOSIS — C50.912 INFILTRATING DUCTAL CARCINOMA OF LEFT BREAST (CMD): ICD-10-CM

## 2021-12-23 PROCEDURE — 77061 BREAST TOMOSYNTHESIS UNI: CPT | Performed by: RADIOLOGY

## 2021-12-23 PROCEDURE — 77065 DX MAMMO INCL CAD UNI: CPT | Performed by: RADIOLOGY

## 2021-12-27 ENCOUNTER — APPOINTMENT (OUTPATIENT)
Dept: OCCUPATIONAL MEDICINE | Facility: HOSPITAL | Age: 51
End: 2021-12-27
Attending: INTERNAL MEDICINE
Payer: COMMERCIAL

## 2021-12-29 ENCOUNTER — OFFICE VISIT (OUTPATIENT)
Dept: OCCUPATIONAL MEDICINE | Facility: HOSPITAL | Age: 51
End: 2021-12-29
Attending: INTERNAL MEDICINE
Payer: COMMERCIAL

## 2021-12-29 PROCEDURE — 97140 MANUAL THERAPY 1/> REGIONS: CPT

## 2021-12-30 ENCOUNTER — APPOINTMENT (OUTPATIENT)
Dept: SURGERY | Age: 51
End: 2021-12-30

## 2022-01-01 ENCOUNTER — HOSPITAL ENCOUNTER (OUTPATIENT)
Dept: RADIATION ONCOLOGY | Facility: HOSPITAL | Age: 52
Discharge: HOME | End: 2022-01-01
Attending: RADIOLOGY
Payer: COMMERCIAL

## 2022-01-02 NOTE — PROGRESS NOTES
Dx:  Malignant neoplasm of overlapping sites of left female breast [facility]) (G23.802)  Secondary malignant neoplasm of lymph nodes ([facility] (C77.9)       Insurance (Authorized # of Visits): 10/18-21   Next MD/Plan Renewal Date:  ?/ 2/14/22  Authorizing Physician tx.   WNL LUE AROM for functional reaching. Goals:  (to be met in 18-21 visits)  1- Pt will tolerate L UE light compression via Tensogrip sleeve for 18-23 hours. 2-  Pt will be independent in decongestive exercises.   3-  Pt will be independent in self-

## 2022-01-03 ENCOUNTER — OFFICE VISIT (OUTPATIENT)
Dept: OCCUPATIONAL MEDICINE | Facility: HOSPITAL | Age: 52
End: 2022-01-03
Attending: INTERNAL MEDICINE
Payer: COMMERCIAL

## 2022-01-03 PROCEDURE — 77300 RADIATION THERAPY DOSE PLAN: CPT | Performed by: RADIOLOGY

## 2022-01-03 PROCEDURE — 77293 RESPIRATOR MOTION MGMT SIMUL: CPT | Performed by: RADIOLOGY

## 2022-01-03 PROCEDURE — 77295 3-D RADIOTHERAPY PLAN: CPT | Performed by: RADIOLOGY

## 2022-01-03 PROCEDURE — 77334 RADIATION TREATMENT AID(S): CPT | Performed by: RADIOLOGY

## 2022-01-03 PROCEDURE — 97140 MANUAL THERAPY 1/> REGIONS: CPT

## 2022-01-04 ENCOUNTER — OFFICE VISIT (OUTPATIENT)
Dept: SURGERY | Age: 52
End: 2022-01-04

## 2022-01-04 VITALS — WEIGHT: 170 LBS | BODY MASS INDEX: 33.38 KG/M2 | HEIGHT: 60 IN

## 2022-01-04 DIAGNOSIS — C50.912 CARCINOMA OF LEFT BREAST METASTATIC TO AXILLARY LYMPH NODE (CMD): Primary | ICD-10-CM

## 2022-01-04 DIAGNOSIS — R92.30 INCONCLUSIVE MAMMOGRAPHY DUE TO DENSE BREASTS: ICD-10-CM

## 2022-01-04 DIAGNOSIS — R92.2 INCONCLUSIVE MAMMOGRAPHY DUE TO DENSE BREASTS: ICD-10-CM

## 2022-01-04 DIAGNOSIS — C50.912 INFILTRATING DUCTAL CARCINOMA OF LEFT BREAST (CMD): ICD-10-CM

## 2022-01-04 DIAGNOSIS — C77.3 CARCINOMA OF LEFT BREAST METASTATIC TO AXILLARY LYMPH NODE (CMD): Primary | ICD-10-CM

## 2022-01-04 DIAGNOSIS — N64.89 POSTOPERATIVE BREAST ASYMMETRY: ICD-10-CM

## 2022-01-04 PROCEDURE — 99213 OFFICE O/P EST LOW 20 MIN: CPT | Performed by: SURGERY

## 2022-01-04 NOTE — PROGRESS NOTES
Dx:  Malignant neoplasm of overlapping sites of left female breast [facility]) (M10.869)  Secondary malignant neoplasm of lymph nodes ([facility] (C77.9)       Insurance (Authorized # of Visits): 11/18-21   Next MD/Plan Renewal Date:  ?/ 2/14/22  Authorizing Physician compression via Tensogrip sleeve for 18-23 hours. 2-  Pt will be independent in decongestive exercises. 3-  Pt will be independent in self-manual lymph drainage.   4-  Reduce L UE/axilla/UQ lymphedema volume by 8 cm and improve tissue quality to soft/mobi

## 2022-01-05 ENCOUNTER — OFFICE VISIT (OUTPATIENT)
Dept: OCCUPATIONAL MEDICINE | Facility: HOSPITAL | Age: 52
End: 2022-01-05
Attending: INTERNAL MEDICINE
Payer: COMMERCIAL

## 2022-01-05 PROCEDURE — 97140 MANUAL THERAPY 1/> REGIONS: CPT

## 2022-01-05 NOTE — PROGRESS NOTES
Dx:  Malignant neoplasm of overlapping sites of left female breast [facility]) (Z82.333)  Secondary malignant neoplasm [facility] lymph nodes ([facility] (C77.9)       Insurance (Authorized # of Visits): 12/18-21   Next MD/Plan Renewal Date:  ?/ 2/14/22  Authorizing Physician exercises.   .Stemmer's Sign: absent  Measurements:        UE Circumferential Measurements (cm)    LEFT    RIGHT   Axilla 35 33.5   15cm above elbow crease 35.5 33.8   10cm above elbow crease 34.4 32   5cm above elbow crease 31.0 28.5   Elbow crease 25.0 24 self-management of lymphedema. Plan:   Discharge this date to [facility]AN ex, LIZA. Pt  to wear new compression sleeve when arrives if tolerated in early stages of RT, then once L UQ is healed after RT daily.     Thank you for your referral. If you have an

## 2022-01-10 ENCOUNTER — E-ADVICE (OUTPATIENT)
Dept: INTERNAL MEDICINE | Age: 52
End: 2022-01-10

## 2022-01-11 ENCOUNTER — HOSPITAL ENCOUNTER (OUTPATIENT)
Dept: RADIATION ONCOLOGY | Facility: HOSPITAL | Age: 52
Discharge: HOME | End: 2022-01-11
Attending: RADIOLOGY
Payer: COMMERCIAL

## 2022-01-11 PROCEDURE — 77412 RADIATION TX DELIVERY LVL 3: CPT | Performed by: RADIOLOGY

## 2022-01-11 PROCEDURE — 77417 THER RADIOLOGY PORT IMAGE(S): CPT | Performed by: RADIOLOGY

## 2022-01-11 PROCEDURE — 77280 THER RAD SIMULAJ FIELD SMPL: CPT | Performed by: RADIOLOGY

## 2022-01-11 RX ORDER — LOSARTAN POTASSIUM 50 MG/1
TABLET ORAL
Qty: 90 TABLET | Refills: 0 | Status: SHIPPED | OUTPATIENT
Start: 2022-01-11 | End: 2022-02-05 | Stop reason: SDUPTHER

## 2022-01-12 PROCEDURE — 77387 GUIDANCE FOR RADJ TX DLVR: CPT | Performed by: RADIOLOGY

## 2022-01-12 PROCEDURE — 77412 RADIATION TX DELIVERY LVL 3: CPT | Performed by: RADIOLOGY

## 2022-01-13 PROCEDURE — 77412 RADIATION TX DELIVERY LVL 3: CPT | Performed by: RADIOLOGY

## 2022-01-13 PROCEDURE — 77387 GUIDANCE FOR RADJ TX DLVR: CPT | Performed by: RADIOLOGY

## 2022-01-14 ENCOUNTER — E-ADVICE (OUTPATIENT)
Dept: INTERNAL MEDICINE | Age: 52
End: 2022-01-14

## 2022-01-14 PROCEDURE — 77412 RADIATION TX DELIVERY LVL 3: CPT | Performed by: RADIOLOGY

## 2022-01-14 PROCEDURE — 77387 GUIDANCE FOR RADJ TX DLVR: CPT | Performed by: RADIOLOGY

## 2022-01-17 ENCOUNTER — HOSPITAL ENCOUNTER (OUTPATIENT)
Dept: RADIATION ONCOLOGY | Facility: HOSPITAL | Age: 52
Discharge: HOME | End: 2022-01-17
Attending: RADIOLOGY
Payer: COMMERCIAL

## 2022-01-17 ENCOUNTER — NURSE ONLY (OUTPATIENT)
Dept: HEMATOLOGY/ONCOLOGY | Facility: HOSPITAL | Age: 52
End: 2022-01-17
Attending: INTERNAL MEDICINE
Payer: COMMERCIAL

## 2022-01-17 DIAGNOSIS — Z17.0 MALIGNANT NEOPLASM OF OVERLAPPING SITES OF LEFT BREAST IN FEMALE, ESTROGEN RECEPTOR POSITIVE (HCC): Primary | ICD-10-CM

## 2022-01-17 DIAGNOSIS — C50.812 MALIGNANT NEOPLASM OF OVERLAPPING SITES OF LEFT BREAST IN FEMALE, ESTROGEN RECEPTOR POSITIVE (HCC): Primary | ICD-10-CM

## 2022-01-17 PROCEDURE — 77412 RADIATION TX DELIVERY LVL 3: CPT | Performed by: RADIOLOGY

## 2022-01-17 PROCEDURE — 77387 GUIDANCE FOR RADJ TX DLVR: CPT | Performed by: RADIOLOGY

## 2022-01-17 PROCEDURE — 96523 IRRIG DRUG DELIVERY DEVICE: CPT

## 2022-01-18 PROCEDURE — 77387 GUIDANCE FOR RADJ TX DLVR: CPT | Performed by: RADIOLOGY

## 2022-01-18 PROCEDURE — 77412 RADIATION TX DELIVERY LVL 3: CPT | Performed by: RADIOLOGY

## 2022-01-19 PROCEDURE — 77412 RADIATION TX DELIVERY LVL 3: CPT | Performed by: RADIOLOGY

## 2022-01-19 PROCEDURE — 77387 GUIDANCE FOR RADJ TX DLVR: CPT | Performed by: RADIOLOGY

## 2022-01-20 PROCEDURE — 77387 GUIDANCE FOR RADJ TX DLVR: CPT | Performed by: RADIOLOGY

## 2022-01-20 PROCEDURE — 77412 RADIATION TX DELIVERY LVL 3: CPT | Performed by: RADIOLOGY

## 2022-01-21 PROCEDURE — 77412 RADIATION TX DELIVERY LVL 3: CPT | Performed by: RADIOLOGY

## 2022-01-21 PROCEDURE — 77387 GUIDANCE FOR RADJ TX DLVR: CPT | Performed by: RADIOLOGY

## 2022-01-21 PROCEDURE — 77336 RADIATION PHYSICS CONSULT: CPT | Performed by: RADIOLOGY

## 2022-01-24 ENCOUNTER — HOSPITAL ENCOUNTER (OUTPATIENT)
Dept: RADIATION ONCOLOGY | Facility: HOSPITAL | Age: 52
Discharge: HOME | End: 2022-01-24
Attending: RADIOLOGY
Payer: COMMERCIAL

## 2022-01-24 VITALS
HEART RATE: 80 BPM | DIASTOLIC BLOOD PRESSURE: 77 MMHG | TEMPERATURE: 99 F | RESPIRATION RATE: 20 BRPM | BODY MASS INDEX: 33 KG/M2 | OXYGEN SATURATION: 99 % | SYSTOLIC BLOOD PRESSURE: 120 MMHG | WEIGHT: 169.19 LBS

## 2022-01-24 DIAGNOSIS — Z17.0 MALIGNANT NEOPLASM OF OVERLAPPING SITES OF LEFT BREAST IN FEMALE, ESTROGEN RECEPTOR POSITIVE (HCC): Primary | ICD-10-CM

## 2022-01-24 DIAGNOSIS — C50.812 MALIGNANT NEOPLASM OF OVERLAPPING SITES OF LEFT BREAST IN FEMALE, ESTROGEN RECEPTOR POSITIVE (HCC): Primary | ICD-10-CM

## 2022-01-24 PROCEDURE — 77412 RADIATION TX DELIVERY LVL 3: CPT | Performed by: RADIOLOGY

## 2022-01-24 PROCEDURE — 77387 GUIDANCE FOR RADJ TX DLVR: CPT | Performed by: RADIOLOGY

## 2022-01-24 NOTE — PROGRESS NOTES
[facility] Radiation Treatment Management Note 6-10    Patient:  Zach Pollack  Age:  46year old  Visit Diagnosis:    1.  Malignant neoplasm of overlapping sites of left breast in female, estrogen receptor positive ([facility]      Primary

## 2022-01-25 PROCEDURE — 77412 RADIATION TX DELIVERY LVL 3: CPT | Performed by: RADIOLOGY

## 2022-01-25 PROCEDURE — 77387 GUIDANCE FOR RADJ TX DLVR: CPT | Performed by: RADIOLOGY

## 2022-01-26 PROCEDURE — 77412 RADIATION TX DELIVERY LVL 3: CPT | Performed by: RADIOLOGY

## 2022-01-26 PROCEDURE — 77387 GUIDANCE FOR RADJ TX DLVR: CPT | Performed by: RADIOLOGY

## 2022-01-27 PROCEDURE — 77412 RADIATION TX DELIVERY LVL 3: CPT | Performed by: RADIOLOGY

## 2022-01-27 PROCEDURE — 77387 GUIDANCE FOR RADJ TX DLVR: CPT | Performed by: RADIOLOGY

## 2022-01-28 PROCEDURE — 77336 RADIATION PHYSICS CONSULT: CPT | Performed by: RADIOLOGY

## 2022-01-28 PROCEDURE — 77412 RADIATION TX DELIVERY LVL 3: CPT | Performed by: RADIOLOGY

## 2022-01-28 PROCEDURE — 77387 GUIDANCE FOR RADJ TX DLVR: CPT | Performed by: RADIOLOGY

## 2022-01-31 ENCOUNTER — HOSPITAL ENCOUNTER (OUTPATIENT)
Dept: RADIATION ONCOLOGY | Facility: HOSPITAL | Age: 52
Discharge: HOME | End: 2022-01-31
Attending: RADIOLOGY
Payer: COMMERCIAL

## 2022-01-31 VITALS
OXYGEN SATURATION: 99 % | HEART RATE: 76 BPM | DIASTOLIC BLOOD PRESSURE: 94 MMHG | BODY MASS INDEX: 33 KG/M2 | RESPIRATION RATE: 18 BRPM | SYSTOLIC BLOOD PRESSURE: 135 MMHG | WEIGHT: 169.19 LBS | TEMPERATURE: 99 F

## 2022-01-31 DIAGNOSIS — C50.812 MALIGNANT NEOPLASM OF OVERLAPPING SITES OF LEFT BREAST IN FEMALE, ESTROGEN RECEPTOR POSITIVE (HCC): Primary | ICD-10-CM

## 2022-01-31 DIAGNOSIS — Z17.0 MALIGNANT NEOPLASM OF OVERLAPPING SITES OF LEFT BREAST IN FEMALE, ESTROGEN RECEPTOR POSITIVE (HCC): Primary | ICD-10-CM

## 2022-01-31 PROCEDURE — 77334 RADIATION TREATMENT AID(S): CPT | Performed by: RADIOLOGY

## 2022-01-31 PROCEDURE — 77307 TELETHX ISODOSE PLAN CPLX: CPT | Performed by: RADIOLOGY

## 2022-01-31 PROCEDURE — 77412 RADIATION TX DELIVERY LVL 3: CPT | Performed by: RADIOLOGY

## 2022-01-31 PROCEDURE — 77290 THER RAD SIMULAJ FIELD CPLX: CPT | Performed by: RADIOLOGY

## 2022-01-31 NOTE — PROGRESS NOTES
[facility] Radiation Treatment Management Note 11-15    Patient:  Sheila Smalls  Age:  46year old  Visit Diagnosis:    1. Malignant neoplasm of overlapping sites of left breast in female, estrogen receptor positive ([facility]      Primary Rad/Onc:  Dr. Marquis Ramon    Site Delivered Dose (cGy) Prescribed Dose (cGy) Fraction #   L SCF 2700 5040 15/28   L breast  2700 5040 15/28     First treatment date:   1.11.2022  Concurrent chemotherapy:  NA    Oncology Vitals 12/10/2021 1/24/2022 1/31/2022   Height 5' 0\" - -   Height 152 cm - -   Weight 169 lb 169 lb 3.2 oz 169 lb 3.2 oz   Weight 76.658 kg 76.749 kg 76.749 kg   BSA (m2) 1.74 m2 1.74 m2 1.74 m2   /91 120/77 135/94   Pulse 95 80 76   Resp 16 20 18   Temp 97.5 98.8 98.7   SpO2 98 99 99   Pain Score - - 0   Some recent data might be hidden        Toxicities:  Fatigue Grade 1= Fatigue relieved by rest  Pruritis Grade 0= None  Dry Skin absent  Dermatitis associated with radiation Grade 0= None  Moisturizer used Aquaphor, aveeno, aloe applied Number of times: 3 times daily. Hyperpigmentation absent      Nursing Note:  VSS  Denies pain. Skin intact. Tristan Gilliland, RN    Physician Note:  Subjective:  DOing well, no issues or c/o. Went skiing over the weekend. Using moisturizer as directed. Objective:  Unchanged      Treatment setup imaging have been reviewed:   Yes    Assessment/Plan:    Continue radiotherapy per plan    Next visit:  1 week    Dr. Marquis Ramon

## 2022-02-01 ENCOUNTER — HOSPITAL ENCOUNTER (OUTPATIENT)
Dept: RADIATION ONCOLOGY | Facility: HOSPITAL | Age: 52
Discharge: HOME | End: 2022-02-01
Attending: RADIOLOGY
Payer: COMMERCIAL

## 2022-02-01 PROCEDURE — 77387 GUIDANCE FOR RADJ TX DLVR: CPT | Performed by: RADIOLOGY

## 2022-02-01 PROCEDURE — 77412 RADIATION TX DELIVERY LVL 3: CPT | Performed by: RADIOLOGY

## 2022-02-02 PROCEDURE — 77387 GUIDANCE FOR RADJ TX DLVR: CPT | Performed by: RADIOLOGY

## 2022-02-02 PROCEDURE — 77412 RADIATION TX DELIVERY LVL 3: CPT | Performed by: RADIOLOGY

## 2022-02-03 PROBLEM — C50.919 INFILTRATING DUCTAL CARCINOMA (CMD): Status: ACTIVE | Noted: 2021-06-01

## 2022-02-03 PROCEDURE — 77412 RADIATION TX DELIVERY LVL 3: CPT | Performed by: RADIOLOGY

## 2022-02-03 PROCEDURE — 77387 GUIDANCE FOR RADJ TX DLVR: CPT | Performed by: RADIOLOGY

## 2022-02-04 PROCEDURE — 77387 GUIDANCE FOR RADJ TX DLVR: CPT | Performed by: RADIOLOGY

## 2022-02-04 PROCEDURE — 77412 RADIATION TX DELIVERY LVL 3: CPT | Performed by: RADIOLOGY

## 2022-02-04 PROCEDURE — 77336 RADIATION PHYSICS CONSULT: CPT | Performed by: RADIOLOGY

## 2022-02-05 ENCOUNTER — OFFICE VISIT (OUTPATIENT)
Dept: INTERNAL MEDICINE | Age: 52
End: 2022-02-05

## 2022-02-05 VITALS
HEART RATE: 88 BPM | BODY MASS INDEX: 32 KG/M2 | DIASTOLIC BLOOD PRESSURE: 82 MMHG | TEMPERATURE: 98.4 F | OXYGEN SATURATION: 98 % | HEIGHT: 60 IN | SYSTOLIC BLOOD PRESSURE: 118 MMHG | WEIGHT: 163 LBS | RESPIRATION RATE: 16 BRPM

## 2022-02-05 DIAGNOSIS — I10 PRIMARY HYPERTENSION: Primary | ICD-10-CM

## 2022-02-05 DIAGNOSIS — F41.9 ANXIETY: ICD-10-CM

## 2022-02-05 PROCEDURE — 3074F SYST BP LT 130 MM HG: CPT | Performed by: INTERNAL MEDICINE

## 2022-02-05 PROCEDURE — 99213 OFFICE O/P EST LOW 20 MIN: CPT | Performed by: INTERNAL MEDICINE

## 2022-02-05 PROCEDURE — 3079F DIAST BP 80-89 MM HG: CPT | Performed by: INTERNAL MEDICINE

## 2022-02-05 RX ORDER — LOSARTAN POTASSIUM 50 MG/1
TABLET ORAL
Qty: 30 TABLET | Refills: 5 | Status: SHIPPED | OUTPATIENT
Start: 2022-02-05 | End: 2022-08-16 | Stop reason: SDUPTHER

## 2022-02-05 RX ORDER — ALPRAZOLAM 0.25 MG/1
0.25 TABLET ORAL NIGHTLY PRN
Qty: 30 TABLET | Refills: 0 | Status: SHIPPED | OUTPATIENT
Start: 2022-02-05

## 2022-02-05 ASSESSMENT — PATIENT HEALTH QUESTIONNAIRE - PHQ9
1. LITTLE INTEREST OR PLEASURE IN DOING THINGS: NOT AT ALL
2. FEELING DOWN, DEPRESSED OR HOPELESS: NOT AT ALL
CLINICAL INTERPRETATION OF PHQ2 SCORE: NO FURTHER SCREENING NEEDED
SUM OF ALL RESPONSES TO PHQ9 QUESTIONS 1 AND 2: 0
SUM OF ALL RESPONSES TO PHQ9 QUESTIONS 1 AND 2: 0

## 2022-02-07 ENCOUNTER — HOSPITAL ENCOUNTER (OUTPATIENT)
Dept: RADIATION ONCOLOGY | Facility: HOSPITAL | Age: 52
Discharge: HOME | End: 2022-02-07
Attending: RADIOLOGY
Payer: COMMERCIAL

## 2022-02-07 VITALS
TEMPERATURE: 99 F | HEART RATE: 69 BPM | SYSTOLIC BLOOD PRESSURE: 120 MMHG | RESPIRATION RATE: 16 BRPM | OXYGEN SATURATION: 100 % | DIASTOLIC BLOOD PRESSURE: 87 MMHG

## 2022-02-07 DIAGNOSIS — Z17.0 MALIGNANT NEOPLASM OF OVERLAPPING SITES OF LEFT BREAST IN FEMALE, ESTROGEN RECEPTOR POSITIVE (HCC): Primary | ICD-10-CM

## 2022-02-07 DIAGNOSIS — C50.812 MALIGNANT NEOPLASM OF OVERLAPPING SITES OF LEFT BREAST IN FEMALE, ESTROGEN RECEPTOR POSITIVE (HCC): Primary | ICD-10-CM

## 2022-02-07 PROCEDURE — 77387 GUIDANCE FOR RADJ TX DLVR: CPT | Performed by: RADIOLOGY

## 2022-02-07 PROCEDURE — 77412 RADIATION TX DELIVERY LVL 3: CPT | Performed by: RADIOLOGY

## 2022-02-07 NOTE — PROGRESS NOTES
[facility] Radiation Treatment Management Note 16-20    Patient:  Carole Ott  Age:  46year old  Visit Diagnosis:    1. Malignant neoplasm of overlapping sites of left breast in female, estrogen receptor positive ([facility]      Primary Rad/Onc:  Dr. Alirio Guajardo    Site Delivered Dose (cGy) Prescribed Dose (cGy) Fraction #   LEFT SCF 3600 5040 20/28   LEFT BREAST 3600 5040 20/28     First treatment date:   1/11/22  Concurrent chemotherapy:  N/A    Oncology Vitals 1/24/2022 1/31/2022 2/7/2022   Height - - -   Height - - -   Weight 169 lb 3.2 oz 169 lb 3.2 oz -   Weight 76.749 kg 76.749 kg -   BSA (m2) 1.74 m2 1.74 m2 -   /77 135/94 120/87   Pulse 80 76 69   Resp 20 18 16   Temp 98.8 98.7 98.5   SpO2 99 99 100   Pain Score - 0 -   Some recent data might be hidden        Toxicities:  Fatigue Grade 0= None  Pruritis Grade 1= Mild or localized; topical intervention indicated  Dry Skin absent  Dermatitis associated with radiation Grade 1= Faint erythema or dry desquamation  Moisturizer used Vanicream and Aquaphor applied Number of times: 2 times daily. Hyperpigmentation absent    Nursing Note:  Pt feels well. Has small rashy area to UIQ of breast.  Mild itching, recommended OTC cortisone cream.    Manuel Ying, RN    Physician Note:  Subjective:  Doing well. Continues to be quite active, cross country skiing. Moisturizing as directed. Sporadic fatigue. Normal appetite. Some itching but skin not too bothersome. Objective:  Patches of erythema, grade 1. No desquamation. Treatment setup imaging have been reviewed:   Yes    Assessment/Plan:    Continue radiotherapy per plan    Next visit:  1 week, f/u 1 month after completion    Dr. Alirio Guajardo

## 2022-02-08 PROCEDURE — 77412 RADIATION TX DELIVERY LVL 3: CPT | Performed by: RADIOLOGY

## 2022-02-08 PROCEDURE — 77387 GUIDANCE FOR RADJ TX DLVR: CPT | Performed by: RADIOLOGY

## 2022-02-09 ENCOUNTER — SOCIAL WORK SERVICES (OUTPATIENT)
Dept: HEMATOLOGY/ONCOLOGY | Facility: HOSPITAL | Age: 52
End: 2022-02-09

## 2022-02-09 PROCEDURE — 77387 GUIDANCE FOR RADJ TX DLVR: CPT | Performed by: RADIOLOGY

## 2022-02-09 PROCEDURE — 77412 RADIATION TX DELIVERY LVL 3: CPT | Performed by: RADIOLOGY

## 2022-02-10 PROCEDURE — 77412 RADIATION TX DELIVERY LVL 3: CPT | Performed by: RADIOLOGY

## 2022-02-10 PROCEDURE — 77387 GUIDANCE FOR RADJ TX DLVR: CPT | Performed by: RADIOLOGY

## 2022-02-11 PROCEDURE — 77412 RADIATION TX DELIVERY LVL 3: CPT | Performed by: RADIOLOGY

## 2022-02-11 PROCEDURE — 77336 RADIATION PHYSICS CONSULT: CPT | Performed by: RADIOLOGY

## 2022-02-11 PROCEDURE — 77387 GUIDANCE FOR RADJ TX DLVR: CPT | Performed by: RADIOLOGY

## 2022-02-14 ENCOUNTER — HOSPITAL ENCOUNTER (OUTPATIENT)
Dept: RADIATION ONCOLOGY | Facility: HOSPITAL | Age: 52
Discharge: HOME | End: 2022-02-14
Attending: RADIOLOGY
Payer: COMMERCIAL

## 2022-02-14 VITALS
DIASTOLIC BLOOD PRESSURE: 87 MMHG | SYSTOLIC BLOOD PRESSURE: 127 MMHG | HEART RATE: 80 BPM | OXYGEN SATURATION: 100 % | RESPIRATION RATE: 16 BRPM | TEMPERATURE: 99 F

## 2022-02-14 DIAGNOSIS — C50.812 MALIGNANT NEOPLASM OF OVERLAPPING SITES OF LEFT BREAST IN FEMALE, ESTROGEN RECEPTOR POSITIVE (HCC): Primary | ICD-10-CM

## 2022-02-14 DIAGNOSIS — Z17.0 MALIGNANT NEOPLASM OF OVERLAPPING SITES OF LEFT BREAST IN FEMALE, ESTROGEN RECEPTOR POSITIVE (HCC): Primary | ICD-10-CM

## 2022-02-14 PROCEDURE — 77412 RADIATION TX DELIVERY LVL 3: CPT | Performed by: RADIOLOGY

## 2022-02-14 PROCEDURE — 77387 GUIDANCE FOR RADJ TX DLVR: CPT | Performed by: RADIOLOGY

## 2022-02-14 RX ORDER — MOMETASONE FUROATE 1 MG/G
CREAM TOPICAL
Qty: 30 G | Refills: 0 | Status: SHIPPED | OUTPATIENT
Start: 2022-02-14 | End: 2022-03-15 | Stop reason: ALTCHOICE

## 2022-02-14 NOTE — PROGRESS NOTES
[facility] Radiation Treatment Management Note 21-25    Patient:  Altagracia Julian  Age:  46year old  Visit Diagnosis:    1. Malignant neoplasm of overlapping sites of left breast in female, estrogen receptor positive ([facility]      Primary Rad/Onc:  Dr. Arik Palma    Site Delivered Dose (cGy) Prescribed Dose (cGy) Fraction #   LEFT SCF 4500 5040 25/28   LEFT BREAST 4500 5040 25/28   L BREAST BOOST 0 1000 0/5     First treatment date:   1/11/22  Concurrent chemotherapy:  N/A    Oncology Vitals 1/31/2022 2/7/2022 2/14/2022   Weight 169 lb 3.2 oz - -   Weight 76.749 kg - -   BSA (m2) 1.74 m2 - -   /94 120/87 127/87   Pulse 76 69 80   Resp 18 16 16   Temp 98.7 98.5 98.6   SpO2 99 100 100   Pain Score 0 - 0   Some recent data might be hidden        Toxicities:  Fatigue Grade 1= Fatigue relieved by rest  Pruritis Grade 0= None  Dry Skin absent  Dermatitis associated with radiation Grade 2= Moderate to brisk erythema, patchy moist desquamation mostly confined to skin folds and creases; moderate edema  Moisturizer used Vanicream and Aquaphor applied Number of times: 2 times daily. Hyperpigmentation absent    Nursing Note:  Pt with open desquamation to L SCF area. Recommended Silvadene BID with Telfa. Occ discomfort felt to breast, no pain meds needed. Price Peterson, RN    Physician Note:  Subjective:  Skin reaction in supraclav  Axilla not bothersome      Objective:  Brisk erythema in scf and less in axilla  Dry desq in IM fold  No wet desq      Treatment setup imaging have been reviewed:   Yes    Assessment/Plan:  Switch to boost, then finish up 4 field RT afterward  Steroid cream to scf and axilla  aveeno ok to IM fold    otv 1 wk    Dr. Idalmis Lamas

## 2022-02-15 ENCOUNTER — HOSPITAL ENCOUNTER (OUTPATIENT)
Dept: RADIATION ONCOLOGY | Facility: HOSPITAL | Age: 52
Discharge: HOME | End: 2022-02-15
Attending: RADIOLOGY
Payer: COMMERCIAL

## 2022-02-15 PROCEDURE — 77280 THER RAD SIMULAJ FIELD SMPL: CPT | Performed by: RADIOLOGY

## 2022-02-15 PROCEDURE — 77412 RADIATION TX DELIVERY LVL 3: CPT | Performed by: RADIOLOGY

## 2022-02-16 PROCEDURE — 77412 RADIATION TX DELIVERY LVL 3: CPT | Performed by: RADIOLOGY

## 2022-02-16 PROCEDURE — 77387 GUIDANCE FOR RADJ TX DLVR: CPT | Performed by: RADIOLOGY

## 2022-02-17 ENCOUNTER — APPOINTMENT (OUTPATIENT)
Dept: RADIATION ONCOLOGY | Facility: HOSPITAL | Age: 52
End: 2022-02-17
Attending: RADIOLOGY
Payer: COMMERCIAL

## 2022-02-17 PROCEDURE — 77387 GUIDANCE FOR RADJ TX DLVR: CPT | Performed by: RADIOLOGY

## 2022-02-17 PROCEDURE — 77412 RADIATION TX DELIVERY LVL 3: CPT | Performed by: RADIOLOGY

## 2022-02-18 PROCEDURE — 77387 GUIDANCE FOR RADJ TX DLVR: CPT | Performed by: RADIOLOGY

## 2022-02-18 PROCEDURE — 77412 RADIATION TX DELIVERY LVL 3: CPT | Performed by: RADIOLOGY

## 2022-02-18 PROCEDURE — 77336 RADIATION PHYSICS CONSULT: CPT | Performed by: RADIOLOGY

## 2022-02-21 ENCOUNTER — NURSE ONLY (OUTPATIENT)
Dept: HEMATOLOGY/ONCOLOGY | Facility: HOSPITAL | Age: 52
End: 2022-02-21
Attending: INTERNAL MEDICINE
Payer: COMMERCIAL

## 2022-02-21 ENCOUNTER — HOSPITAL ENCOUNTER (OUTPATIENT)
Dept: RADIATION ONCOLOGY | Facility: HOSPITAL | Age: 52
Discharge: HOME | End: 2022-02-21
Attending: RADIOLOGY
Payer: COMMERCIAL

## 2022-02-21 VITALS
RESPIRATION RATE: 18 BRPM | DIASTOLIC BLOOD PRESSURE: 71 MMHG | HEART RATE: 79 BPM | OXYGEN SATURATION: 100 % | SYSTOLIC BLOOD PRESSURE: 122 MMHG | TEMPERATURE: 98 F

## 2022-02-21 DIAGNOSIS — Z17.0 MALIGNANT NEOPLASM OF OVERLAPPING SITES OF LEFT BREAST IN FEMALE, ESTROGEN RECEPTOR POSITIVE (HCC): Primary | ICD-10-CM

## 2022-02-21 DIAGNOSIS — C50.812 MALIGNANT NEOPLASM OF OVERLAPPING SITES OF LEFT BREAST IN FEMALE, ESTROGEN RECEPTOR POSITIVE (HCC): Primary | ICD-10-CM

## 2022-02-21 PROCEDURE — 96523 IRRIG DRUG DELIVERY DEVICE: CPT

## 2022-02-21 PROCEDURE — 77387 GUIDANCE FOR RADJ TX DLVR: CPT | Performed by: RADIOLOGY

## 2022-02-21 PROCEDURE — 77412 RADIATION TX DELIVERY LVL 3: CPT | Performed by: RADIOLOGY

## 2022-02-21 NOTE — PROGRESS NOTES
[facility] Radiation Treatment Management Note 26-30    Patient:  Zhen Milligan  Age:  46year old  Visit Diagnosis:  Left breast cancer  Primary Rad/Onc:  Dr. Mateo Rizvi    Site Delivered Dose (cGy) Prescribed Dose (cGy) Fraction #   L SCF 4500 5400 25/28   L Breast 4500 5400 25/28   L breast boost 1000 1000 5/5     First treatment date:   1.11.2022  Concurrent chemotherapy:  NA    Oncology Vitals 1/31/2022 2/7/2022 2/14/2022   Weight 169 lb 3.2 oz - -   Weight 76.749 kg - -   BSA (m2) 1.74 m2 - -   /94 120/87 127/87   Pulse 76 69 80   Resp 18 16 16   Temp 98.7 98.5 98.6   SpO2 99 100 100   Pain Score 0 - 0   Some recent data might be hidden        Toxicities:  Fatigue Grade 1= Fatigue relieved by rest  Pruritis Grade 0= None  Dry Skin noted  Dermatitis associated with radiation Grade 1= Faint erythema or dry desquamation  Moisturizer used Mometasone and Aquaphor applied Number of times: 2 times daily. Hyperpigmentation noted    Nursing Note:  VSS  Had been applying mometasone to open areas only. Reviewed instructions for use, and she verbalized understanding. SCF pink and dry, but tender. Will apply aquaphor only to this area going forward. Will plan to see [facility] for skin check. Pt verbalized understanding. Priscilla Ragsdale, DIEGO    Physician Note:  Subjective:  Open area in [facility]  Otherwise ok      Objective:  Small open area in [facility]; no infection      Treatment setup imaging have been reviewed:   Yes    Assessment/Plan:    SSD x 5 d to [facility]; [place] elsewhere x 5 d; skin check Beth Silverman MD  For Mateo Rizvi MD

## 2022-02-22 ENCOUNTER — NURSE NAVIGATOR ENCOUNTER (OUTPATIENT)
Dept: HEMATOLOGY/ONCOLOGY | Facility: HOSPITAL | Age: 52
End: 2022-02-22

## 2022-02-22 PROCEDURE — 77412 RADIATION TX DELIVERY LVL 3: CPT | Performed by: RADIOLOGY

## 2022-02-22 PROCEDURE — 77387 GUIDANCE FOR RADJ TX DLVR: CPT | Performed by: RADIOLOGY

## 2022-02-22 NOTE — PROGRESS NOTES
Phoned patient to discuss plan of care. Pt is checking in for her last radiation appointment now. She will finish RT and will schedule for a follow up with Dr. Maryann Bearden. Encouraged patient to phone with any other questions or concerns.

## 2022-02-23 ENCOUNTER — EXTERNAL RECORD (OUTPATIENT)
Dept: HEALTH INFORMATION MANAGEMENT | Facility: OTHER | Age: 52
End: 2022-02-23

## 2022-02-23 ENCOUNTER — OFFICE VISIT (OUTPATIENT)
Dept: SURGERY | Facility: CLINIC | Age: 52
End: 2022-02-23
Payer: COMMERCIAL

## 2022-02-23 ENCOUNTER — LAB ENCOUNTER (OUTPATIENT)
Dept: LAB | Age: 52
End: 2022-02-23
Attending: PHYSICIAN ASSISTANT
Payer: COMMERCIAL

## 2022-02-23 DIAGNOSIS — Z17.0 MALIGNANT NEOPLASM OF OVERLAPPING SITES OF LEFT BREAST IN FEMALE, ESTROGEN RECEPTOR POSITIVE (HCC): ICD-10-CM

## 2022-02-23 DIAGNOSIS — C50.812 MALIGNANT NEOPLASM OF OVERLAPPING SITES OF LEFT BREAST IN FEMALE, ESTROGEN RECEPTOR POSITIVE (HCC): Primary | ICD-10-CM

## 2022-02-23 DIAGNOSIS — Z17.0 MALIGNANT NEOPLASM OF OVERLAPPING SITES OF LEFT BREAST IN FEMALE, ESTROGEN RECEPTOR POSITIVE (HCC): Primary | ICD-10-CM

## 2022-02-23 DIAGNOSIS — C50.812 MALIGNANT NEOPLASM OF OVERLAPPING SITES OF LEFT BREAST IN FEMALE, ESTROGEN RECEPTOR POSITIVE (HCC): ICD-10-CM

## 2022-02-23 DIAGNOSIS — N64.89 BREAST ASYMMETRY: ICD-10-CM

## 2022-02-23 LAB
ALBUMIN SERPL-MCNC: 3.7 G/DL (ref 3.4–5)
DEPRECATED HBV CORE AB SER IA-ACNC: 159.5 NG/ML
ERYTHROCYTE [DISTWIDTH] IN BLOOD BY AUTOMATED COUNT: 12.9 %
HCT VFR BLD AUTO: 35.5 %
HGB BLD-MCNC: 11.7 G/DL
IRON SATN MFR SERPL: 22 %
IRON SERPL-MCNC: 69 UG/DL
MCH RBC QN AUTO: 30 PG (ref 26–34)
MCHC RBC AUTO-ENTMCNC: 33 G/DL (ref 31–37)
MCV RBC AUTO: 91 FL
PLATELET # BLD AUTO: 194 10(3)UL (ref 150–450)
RBC # BLD AUTO: 3.9 X10(6)UL
TIBC SERPL-MCNC: 319 UG/DL (ref 240–450)
TRANSFERRIN SERPL-MCNC: 214 MG/DL (ref 200–360)
VIT D+METAB SERPL-MCNC: 10.5 NG/ML (ref 30–100)
WBC # BLD AUTO: 3.6 X10(3) UL (ref 4–11)

## 2022-02-23 PROCEDURE — 82728 ASSAY OF FERRITIN: CPT | Performed by: PHYSICIAN ASSISTANT

## 2022-02-23 PROCEDURE — 77387 GUIDANCE FOR RADJ TX DLVR: CPT | Performed by: RADIOLOGY

## 2022-02-23 PROCEDURE — 77412 RADIATION TX DELIVERY LVL 3: CPT | Performed by: RADIOLOGY

## 2022-02-23 PROCEDURE — 82040 ASSAY OF SERUM ALBUMIN: CPT | Performed by: PHYSICIAN ASSISTANT

## 2022-02-23 PROCEDURE — 84134 ASSAY OF PREALBUMIN: CPT | Performed by: PHYSICIAN ASSISTANT

## 2022-02-23 PROCEDURE — 85027 COMPLETE CBC AUTOMATED: CPT | Performed by: PHYSICIAN ASSISTANT

## 2022-02-23 PROCEDURE — 83540 ASSAY OF IRON: CPT | Performed by: PHYSICIAN ASSISTANT

## 2022-02-23 PROCEDURE — 82306 VITAMIN D 25 HYDROXY: CPT | Performed by: PHYSICIAN ASSISTANT

## 2022-02-23 PROCEDURE — 99243 OFF/OP CNSLTJ NEW/EST LOW 30: CPT | Performed by: SURGERY

## 2022-02-23 PROCEDURE — 83550 IRON BINDING TEST: CPT | Performed by: PHYSICIAN ASSISTANT

## 2022-02-24 ENCOUNTER — TELEPHONE (OUTPATIENT)
Dept: SURGERY | Facility: CLINIC | Age: 52
End: 2022-02-24

## 2022-02-24 ENCOUNTER — EXTERNAL RECORD (OUTPATIENT)
Dept: HEALTH INFORMATION MANAGEMENT | Facility: OTHER | Age: 52
End: 2022-02-24

## 2022-02-24 PROCEDURE — 77387 GUIDANCE FOR RADJ TX DLVR: CPT | Performed by: RADIOLOGY

## 2022-02-24 PROCEDURE — 77412 RADIATION TX DELIVERY LVL 3: CPT | Performed by: RADIOLOGY

## 2022-02-24 NOTE — TELEPHONE ENCOUNTER
Calling pt in regards to scheduling surgery. Informed pt that I have 07/26/2022 available at [facility] with Dr. Madelyn Banerjee. Pt verbalized understanding and in agreement with date and location. All questions answered. Encouraged pt to call or manetch message office with any other questions or concerns.

## 2022-02-25 PROCEDURE — 77336 RADIATION PHYSICS CONSULT: CPT | Performed by: RADIOLOGY

## 2022-03-08 ENCOUNTER — EXTERNAL RECORD (OUTPATIENT)
Dept: HEALTH INFORMATION MANAGEMENT | Facility: OTHER | Age: 52
End: 2022-03-08

## 2022-03-09 NOTE — PROGRESS NOTES
[address]    PATIENT'S NAME: Alvera Jose   RADIATION ONCOLOGIST: Rich Green M.D. PATIENT ACCOUNT #: [de-identified] Bettie Hadley   [facility]   MEDICAL RECORD #: [id number] YOB: 1970   DATE: 02/24/2022       RADIATION ONCOLOGY TREATMENT SUMMARY    REFERRING PHYSICIAN:  Arpita Meyers MD.    DIAGNOSIS:  Infiltrating ductal carcinoma of the left breast, mI3H5C9, ER/AL positive, and HER2/layla negative. HISTORY:  The patient is a 15-year-old female diagnosed with a regionally advanced breast cancer in 2021. She had self-palpated a mass in December 2020. A mammogram in May 2021 and additional imaging showed a BI-RADS 5 lesion in the upper outer quadrant of the left breast.  A biopsy showed grade 2 invasive ductal carcinoma and noted to be strongly ER positive and AL positive and HER2/layla negative. She discussed neoadjuvant treatment, but she ultimately wanted to go directly to surgery and had a procedure done by Dr. Cole Both on 06/29/2021. Final pathology showed a 4.0 cm tumor of grade 2 with widely negative margins. There was extensive lymphovascular space invasion, and DCIS was associated with the primary specimen. On the lymph node dissection, the sentinel lymph node was involved, and 7 additional lymph nodes were taken as well. In total, 7 of 8 lymph nodes were involved with extensive invasion into the perinodal adipose tissue. The patient then saw Dr. Juan Adamson to discuss adjuvant therapy. A metastatic workup was negative and then she began treatment with chemotherapy consisting of dose-dense Adriamycin and Cytoxan and then weekly paclitaxel. She tolerated this fairly well and then ultimately, after completion, was referred to Radiation Oncology for further therapy. TECHNICAL SUMMARY:  The patient was treated to the left breast and supraclavicular fossa to a dose of 5040 cGy in 180 cGy daily fractions.   This was done with deep inspiratory breath hold tangents as well as an MCDONNELL/LPO beam arrangement for the supraclavicular fossa. Treatment was accomplished through the use of a mixed beam of 6 and 18 MV photons. I did administer a boost of an additional 1000 cGy in 200 cGy daily fractions. This was done with 3D conformal techniques and a combination of 6 and 10 MV photons. Her treatments all began on 01/11/2022 and completed on 02/24/2022 for a total of 44 elapsed days, during which she received all 33 prescribed radiation treatments. TREATMENT SUMMARY:  The patient was treated adjuvantly with radiation given the extensive regional disease. Given her multiple positive lymph nodes and extracapsular extension, I opted to treat the entire region, including the supraclavicular fossa, to a dose of 5040 cGy in 180 cGy daily fractions. This was done supine with deep inspiratory breath hold tangents. I also treated the supraclavicular fossa to a similar dose scheme. After the treatments were over, I did administer a boost of an additional 1000 cGy in 200 cGy fractions with 3D conformal treatment. TOLERANCE:  The patient tolerated her treatments quite well overall. She remained quite active and continued skiing throughout therapy. She did develop an open area in the supraclavicular fossa by the end of therapy, which was treated with Silvadene cream.  She otherwise was given mometasone cream elsewhere throughout the treatment field. She otherwise did well and did not have much limitations or other difficulties during therapy. She otherwise completed her treatments without any breaks or interruptions in treatment. FOLLOWUP:  The patient was given a followup appointment to see me again in 1 month. She was told to contact my office sooner if she should have any problems or questions prior to that time. Thank you very much for allowing me the opportunity to participate in the care of this patient.   If there should be any questions regarding the radiotherapy, please feel free to contact me at any time. Dictated By Fernando Irvin M.D.  d: 03/08/2022 09:10:58  t: 03/09/2022 02:17:58  [facility] [id number]  NAD/    cc: Thania Lorenz M.D. Dr, Sisto Plana Dr. Starleen Meager

## 2022-03-15 ENCOUNTER — EXTERNAL RECORD (OUTPATIENT)
Dept: HEALTH INFORMATION MANAGEMENT | Facility: OTHER | Age: 52
End: 2022-03-15

## 2022-03-15 ENCOUNTER — OFFICE VISIT (OUTPATIENT)
Dept: HEMATOLOGY/ONCOLOGY | Facility: HOSPITAL | Age: 52
End: 2022-03-15
Attending: INTERNAL MEDICINE
Payer: COMMERCIAL

## 2022-03-15 VITALS
DIASTOLIC BLOOD PRESSURE: 90 MMHG | BODY MASS INDEX: 33.47 KG/M2 | WEIGHT: 170.5 LBS | SYSTOLIC BLOOD PRESSURE: 134 MMHG | TEMPERATURE: 98 F | HEART RATE: 97 BPM | OXYGEN SATURATION: 100 % | HEIGHT: 60 IN | RESPIRATION RATE: 18 BRPM

## 2022-03-15 DIAGNOSIS — Z17.0 MALIGNANT NEOPLASM OF OVERLAPPING SITES OF LEFT BREAST IN FEMALE, ESTROGEN RECEPTOR POSITIVE (HCC): ICD-10-CM

## 2022-03-15 DIAGNOSIS — C50.812 MALIGNANT NEOPLASM OF OVERLAPPING SITES OF LEFT BREAST IN FEMALE, ESTROGEN RECEPTOR POSITIVE (HCC): ICD-10-CM

## 2022-03-15 DIAGNOSIS — Z78.0 POSTMENOPAUSAL: Primary | ICD-10-CM

## 2022-03-15 PROCEDURE — 99215 OFFICE O/P EST HI 40 MIN: CPT | Performed by: INTERNAL MEDICINE

## 2022-03-15 RX ORDER — ANASTROZOLE 1 MG/1
1 TABLET ORAL DAILY
Qty: 30 TABLET | Refills: 6 | Status: SHIPPED | OUTPATIENT
Start: 2022-03-15

## 2022-04-06 ENCOUNTER — HOSPITAL ENCOUNTER (OUTPATIENT)
Dept: BONE DENSITY | Age: 52
Discharge: HOME | End: 2022-04-06
Attending: INTERNAL MEDICINE
Payer: COMMERCIAL

## 2022-04-06 ENCOUNTER — HOSPITAL ENCOUNTER (OUTPATIENT)
Dept: BONE DENSITY | Age: 52
End: 2022-04-06
Attending: INTERNAL MEDICINE
Payer: COMMERCIAL

## 2022-04-06 DIAGNOSIS — Z78.0 POSTMENOPAUSAL: ICD-10-CM

## 2022-04-06 PROCEDURE — 77080 DXA BONE DENSITY AXIAL: CPT | Performed by: INTERNAL MEDICINE

## 2022-04-12 PROBLEM — Z79.811 AROMATASE INHIBITOR USE: Status: ACTIVE | Noted: 2022-04-12

## 2022-04-13 ENCOUNTER — OFFICE VISIT (OUTPATIENT)
Dept: HEMATOLOGY/ONCOLOGY | Facility: HOSPITAL | Age: 52
End: 2022-04-13
Attending: INTERNAL MEDICINE
Payer: COMMERCIAL

## 2022-04-13 VITALS
RESPIRATION RATE: 16 BRPM | OXYGEN SATURATION: 97 % | TEMPERATURE: 98 F | HEART RATE: 99 BPM | SYSTOLIC BLOOD PRESSURE: 111 MMHG | DIASTOLIC BLOOD PRESSURE: 76 MMHG

## 2022-04-13 DIAGNOSIS — K76.9 LIVER LESION: ICD-10-CM

## 2022-04-13 DIAGNOSIS — C50.812 MALIGNANT NEOPLASM OF OVERLAPPING SITES OF LEFT BREAST IN FEMALE, ESTROGEN RECEPTOR POSITIVE (HCC): Primary | ICD-10-CM

## 2022-04-13 DIAGNOSIS — Z17.0 MALIGNANT NEOPLASM OF OVERLAPPING SITES OF LEFT BREAST IN FEMALE, ESTROGEN RECEPTOR POSITIVE (HCC): ICD-10-CM

## 2022-04-13 DIAGNOSIS — C77.9 REGIONAL LYMPH NODE METASTASIS PRESENT (HCC): ICD-10-CM

## 2022-04-13 DIAGNOSIS — C50.812 MALIGNANT NEOPLASM OF OVERLAPPING SITES OF LEFT BREAST IN FEMALE, ESTROGEN RECEPTOR POSITIVE (HCC): ICD-10-CM

## 2022-04-13 DIAGNOSIS — Z78.0 POSTMENOPAUSAL: ICD-10-CM

## 2022-04-13 DIAGNOSIS — R94.8 ABNORMAL RADIONUCLIDE BONE SCAN: ICD-10-CM

## 2022-04-13 DIAGNOSIS — Z17.0 MALIGNANT NEOPLASM OF OVERLAPPING SITES OF LEFT BREAST IN FEMALE, ESTROGEN RECEPTOR POSITIVE (HCC): Primary | ICD-10-CM

## 2022-04-13 LAB
ALBUMIN SERPL-MCNC: 3.8 G/DL (ref 3.4–5)
ALBUMIN/GLOB SERPL: 1.2 {RATIO} (ref 1–2)
ALP LIVER SERPL-CCNC: 79 U/L
ALT SERPL-CCNC: 40 U/L
ANION GAP SERPL CALC-SCNC: 6 MMOL/L (ref 0–18)
AST SERPL-CCNC: 26 U/L (ref 15–37)
BASOPHILS # BLD AUTO: 0.03 X10(3) UL (ref 0–0.2)
BASOPHILS NFR BLD AUTO: 0.6 %
BILIRUB SERPL-MCNC: 1.1 MG/DL (ref 0.1–2)
BUN BLD-MCNC: 24 MG/DL (ref 7–18)
CALCIUM BLD-MCNC: 9.2 MG/DL (ref 8.5–10.1)
CHLORIDE SERPL-SCNC: 108 MMOL/L (ref 98–112)
CO2 SERPL-SCNC: 25 MMOL/L (ref 21–32)
CREAT BLD-MCNC: 1.01 MG/DL
EOSINOPHIL # BLD AUTO: 0.19 X10(3) UL (ref 0–0.7)
EOSINOPHIL NFR BLD AUTO: 3.9 %
ERYTHROCYTE [DISTWIDTH] IN BLOOD BY AUTOMATED COUNT: 13.2 %
FASTING STATUS PATIENT QL REPORTED: NO
GLOBULIN PLAS-MCNC: 3.3 G/DL (ref 2.8–4.4)
GLUCOSE BLD-MCNC: 151 MG/DL (ref 70–99)
HCT VFR BLD AUTO: 34.3 %
HGB BLD-MCNC: 11.6 G/DL
IMM GRANULOCYTES # BLD AUTO: 0.04 X10(3) UL (ref 0–1)
IMM GRANULOCYTES NFR BLD: 0.8 %
LYMPHOCYTES # BLD AUTO: 1.36 X10(3) UL (ref 1–4)
LYMPHOCYTES NFR BLD AUTO: 27.7 %
MCH RBC QN AUTO: 31.1 PG (ref 26–34)
MCHC RBC AUTO-ENTMCNC: 33.8 G/DL (ref 31–37)
MCV RBC AUTO: 92 FL
MONOCYTES # BLD AUTO: 0.48 X10(3) UL (ref 0.1–1)
MONOCYTES NFR BLD AUTO: 9.8 %
NEUTROPHILS # BLD AUTO: 2.81 X10 (3) UL (ref 1.5–7.7)
NEUTROPHILS # BLD AUTO: 2.81 X10(3) UL (ref 1.5–7.7)
NEUTROPHILS NFR BLD AUTO: 57.2 %
OSMOLALITY SERPL CALC.SUM OF ELEC: 295 MOSM/KG (ref 275–295)
PLATELET # BLD AUTO: 195 10(3)UL (ref 150–450)
POTASSIUM SERPL-SCNC: 3.9 MMOL/L (ref 3.5–5.1)
PROT SERPL-MCNC: 7.1 G/DL (ref 6.4–8.2)
RBC # BLD AUTO: 3.73 X10(6)UL
SODIUM SERPL-SCNC: 139 MMOL/L (ref 136–145)
VIT D+METAB SERPL-MCNC: 15.4 NG/ML (ref 30–100)
WBC # BLD AUTO: 4.9 X10(3) UL (ref 4–11)

## 2022-04-13 PROCEDURE — 99214 OFFICE O/P EST MOD 30 MIN: CPT | Performed by: INTERNAL MEDICINE

## 2022-04-13 PROCEDURE — 80053 COMPREHEN METABOLIC PANEL: CPT

## 2022-04-13 PROCEDURE — 85025 COMPLETE CBC W/AUTO DIFF WBC: CPT

## 2022-04-13 PROCEDURE — 36591 DRAW BLOOD OFF VENOUS DEVICE: CPT

## 2022-04-13 PROCEDURE — 82306 VITAMIN D 25 HYDROXY: CPT

## 2022-04-13 NOTE — PROGRESS NOTES
Education Record    Learner:  Patient    Disease / Diagnosis: left breast cancer       Barriers / Limitations:  None   Comments:    Method:  Discussion   Comments:    General Topics:  Plan of care reviewed   Comments:    Outcome:  Shows understanding   Comments:  fini RT in Feb  Pt taking anastrozole for about 1 month. Reports fatigue, not sleeping well due to allergies. Denies joint pain.

## 2022-04-13 NOTE — PROGRESS NOTES
Education Record    Learner:  Patient    Disease / Diagnosis: Breast Cancer - CLL    Barriers / Limitations:  None   Comments:    Method:  Brief focused and Reinforcement   Comments:    General Topics:  Plan of care reviewed   Comments:    Outcome:  Shows understanding   Comments:

## 2022-04-14 ENCOUNTER — TELEPHONE (OUTPATIENT)
Dept: HEMATOLOGY/ONCOLOGY | Facility: HOSPITAL | Age: 52
End: 2022-04-14

## 2022-04-14 NOTE — TELEPHONE ENCOUNTER
Meg Bennett MD  [facility] [address] [facility] [address]  Call, Vit D low, Rx for 12 weekly doses sent to her pharmacy. Had sent last month too, not sure I she started it       Reviewed the above with patient. Verbalized understanding.

## 2022-04-22 ENCOUNTER — TELEPHONE (OUTPATIENT)
Dept: HEMATOLOGY/ONCOLOGY | Facility: HOSPITAL | Age: 52
End: 2022-04-22

## 2022-04-22 NOTE — TELEPHONE ENCOUNTER
Jordana Banuelos calling with [facility] . Pharmacist would like to speak with  regarding patients javier ref# [id number] call [phone].  I did not see medication on list. carito

## 2022-04-25 NOTE — PROGRESS NOTES
[facility] transferred Rx to [facility].    [facility] transferred Rx to  [address] Rx sent to [facility] per request.

## 2022-04-26 ENCOUNTER — TELEPHONE (OUTPATIENT)
Dept: HEMATOLOGY/ONCOLOGY | Facility: HOSPITAL | Age: 52
End: 2022-04-26

## 2022-04-26 NOTE — TELEPHONE ENCOUNTER
Mica Cuevas called she received her delivery of Abemaciclib and was told to call for instructions. Please call.

## 2022-04-26 NOTE — TELEPHONE ENCOUNTER
Returned the call, and let her know we would arrange a chemo education, and then plan when next appt and labs would be due. Pt verbalized understanding.

## 2022-04-29 ENCOUNTER — VIRTUAL PHONE E/M (OUTPATIENT)
Dept: HEMATOLOGY/ONCOLOGY | Facility: HOSPITAL | Age: 52
End: 2022-04-29
Attending: INTERNAL MEDICINE
Payer: COMMERCIAL

## 2022-04-29 DIAGNOSIS — Z17.0 MALIGNANT NEOPLASM OF OVERLAPPING SITES OF LEFT BREAST IN FEMALE, ESTROGEN RECEPTOR POSITIVE (HCC): Primary | ICD-10-CM

## 2022-04-29 DIAGNOSIS — C77.9 REGIONAL LYMPH NODE METASTASIS PRESENT (HCC): ICD-10-CM

## 2022-04-29 DIAGNOSIS — C50.812 MALIGNANT NEOPLASM OF OVERLAPPING SITES OF LEFT BREAST IN FEMALE, ESTROGEN RECEPTOR POSITIVE (HCC): Primary | ICD-10-CM

## 2022-04-29 PROBLEM — J45.20 MILD INTERMITTENT ASTHMA WITHOUT COMPLICATION: Status: ACTIVE | Noted: 2017-04-20

## 2022-04-29 PROBLEM — L71.9 ROSACEA: Status: ACTIVE | Noted: 2021-05-13

## 2022-04-29 PROBLEM — J30.2 SEASONAL ALLERGIES: Status: ACTIVE | Noted: 2019-01-03

## 2022-04-29 PROBLEM — J45.20 MILD INTERMITTENT ASTHMA WITHOUT COMPLICATION (HCC): Status: ACTIVE | Noted: 2017-04-20

## 2022-04-29 PROCEDURE — 99443 PHONE E/M BY PHYS 21-30 MIN: CPT | Performed by: NURSE PRACTITIONER

## 2022-05-05 ENCOUNTER — HOSPITAL ENCOUNTER (OUTPATIENT)
Dept: NUCLEAR MEDICINE | Facility: HOSPITAL | Age: 52
Discharge: HOME | End: 2022-05-05
Attending: INTERNAL MEDICINE
Payer: COMMERCIAL

## 2022-05-05 DIAGNOSIS — R94.8 ABNORMAL RADIONUCLIDE BONE SCAN: ICD-10-CM

## 2022-05-05 DIAGNOSIS — C50.812 MALIGNANT NEOPLASM OF OVERLAPPING SITES OF LEFT BREAST IN FEMALE, ESTROGEN RECEPTOR POSITIVE (HCC): ICD-10-CM

## 2022-05-05 DIAGNOSIS — Z17.0 MALIGNANT NEOPLASM OF OVERLAPPING SITES OF LEFT BREAST IN FEMALE, ESTROGEN RECEPTOR POSITIVE (HCC): ICD-10-CM

## 2022-05-05 PROCEDURE — 78832 RP LOCLZJ TUM SPECT W/CT 2: CPT | Performed by: INTERNAL MEDICINE

## 2022-05-05 PROCEDURE — 78306 BONE IMAGING WHOLE BODY: CPT | Performed by: INTERNAL MEDICINE

## 2022-05-10 ENCOUNTER — HOSPITAL ENCOUNTER (OUTPATIENT)
Dept: CT IMAGING | Facility: HOSPITAL | Age: 52
Discharge: HOME | End: 2022-05-10
Attending: INTERNAL MEDICINE
Payer: COMMERCIAL

## 2022-05-10 DIAGNOSIS — K76.9 LIVER LESION: ICD-10-CM

## 2022-05-10 DIAGNOSIS — C77.9 REGIONAL LYMPH NODE METASTASIS PRESENT (HCC): ICD-10-CM

## 2022-05-10 DIAGNOSIS — Z17.0 MALIGNANT NEOPLASM OF OVERLAPPING SITES OF LEFT BREAST IN FEMALE, ESTROGEN RECEPTOR POSITIVE (HCC): ICD-10-CM

## 2022-05-10 DIAGNOSIS — C50.812 MALIGNANT NEOPLASM OF OVERLAPPING SITES OF LEFT BREAST IN FEMALE, ESTROGEN RECEPTOR POSITIVE (HCC): ICD-10-CM

## 2022-05-10 PROCEDURE — 74177 CT ABD & PELVIS W/CONTRAST: CPT | Performed by: INTERNAL MEDICINE

## 2022-05-10 PROCEDURE — 71260 CT THORAX DX C+: CPT | Performed by: INTERNAL MEDICINE

## 2022-05-10 RX ORDER — IOHEXOL 350 MG/ML
100 INJECTION, SOLUTION INTRAVENOUS
Status: COMPLETED | OUTPATIENT
Start: 2022-05-10 | End: 2022-05-10

## 2022-05-10 RX ADMIN — IOHEXOL 100 ML: 350 INJECTION, SOLUTION INTRAVENOUS at 09:38:00

## 2022-05-13 ENCOUNTER — NURSE ONLY (OUTPATIENT)
Dept: HEMATOLOGY/ONCOLOGY | Facility: HOSPITAL | Age: 52
End: 2022-05-13
Attending: INTERNAL MEDICINE
Payer: COMMERCIAL

## 2022-05-13 DIAGNOSIS — C77.9 REGIONAL LYMPH NODE METASTASIS PRESENT (HCC): ICD-10-CM

## 2022-05-13 DIAGNOSIS — C50.812 MALIGNANT NEOPLASM OF OVERLAPPING SITES OF LEFT BREAST IN FEMALE, ESTROGEN RECEPTOR POSITIVE (HCC): ICD-10-CM

## 2022-05-13 DIAGNOSIS — Z17.0 MALIGNANT NEOPLASM OF OVERLAPPING SITES OF LEFT BREAST IN FEMALE, ESTROGEN RECEPTOR POSITIVE (HCC): ICD-10-CM

## 2022-05-13 LAB
ALBUMIN SERPL-MCNC: 3.8 G/DL (ref 3.4–5)
ALBUMIN/GLOB SERPL: 1.2 {RATIO} (ref 1–2)
ALP LIVER SERPL-CCNC: 62 U/L
ALT SERPL-CCNC: 26 U/L
ANION GAP SERPL CALC-SCNC: 9 MMOL/L (ref 0–18)
AST SERPL-CCNC: 22 U/L (ref 15–37)
BASOPHILS # BLD AUTO: 0.02 X10(3) UL (ref 0–0.2)
BASOPHILS NFR BLD AUTO: 0.5 %
BILIRUB SERPL-MCNC: 0.9 MG/DL (ref 0.1–2)
BUN BLD-MCNC: 16 MG/DL (ref 7–18)
CALCIUM BLD-MCNC: 9.3 MG/DL (ref 8.5–10.1)
CHLORIDE SERPL-SCNC: 106 MMOL/L (ref 98–112)
CO2 SERPL-SCNC: 21 MMOL/L (ref 21–32)
CREAT BLD-MCNC: 1.25 MG/DL
EOSINOPHIL # BLD AUTO: 0.19 X10(3) UL (ref 0–0.7)
EOSINOPHIL NFR BLD AUTO: 4.9 %
ERYTHROCYTE [DISTWIDTH] IN BLOOD BY AUTOMATED COUNT: 12.2 %
FASTING STATUS PATIENT QL REPORTED: NO
GLOBULIN PLAS-MCNC: 3.2 G/DL (ref 2.8–4.4)
GLUCOSE BLD-MCNC: 105 MG/DL (ref 70–99)
HCG SERPL QL: NEGATIVE
HCT VFR BLD AUTO: 31.7 %
HGB BLD-MCNC: 10.8 G/DL
IMM GRANULOCYTES # BLD AUTO: 0.03 X10(3) UL (ref 0–1)
IMM GRANULOCYTES NFR BLD: 0.8 %
LYMPHOCYTES # BLD AUTO: 1.4 X10(3) UL (ref 1–4)
LYMPHOCYTES NFR BLD AUTO: 36.4 %
MCH RBC QN AUTO: 32 PG (ref 26–34)
MCHC RBC AUTO-ENTMCNC: 34.1 G/DL (ref 31–37)
MCV RBC AUTO: 94.1 FL
MONOCYTES # BLD AUTO: 0.26 X10(3) UL (ref 0.1–1)
MONOCYTES NFR BLD AUTO: 6.8 %
NEUTROPHILS # BLD AUTO: 1.95 X10 (3) UL (ref 1.5–7.7)
NEUTROPHILS # BLD AUTO: 1.95 X10(3) UL (ref 1.5–7.7)
NEUTROPHILS NFR BLD AUTO: 50.6 %
OSMOLALITY SERPL CALC.SUM OF ELEC: 284 MOSM/KG (ref 275–295)
PLATELET # BLD AUTO: 149 10(3)UL (ref 150–450)
POTASSIUM SERPL-SCNC: 4.2 MMOL/L (ref 3.5–5.1)
PROT SERPL-MCNC: 7 G/DL (ref 6.4–8.2)
RBC # BLD AUTO: 3.37 X10(6)UL
SODIUM SERPL-SCNC: 136 MMOL/L (ref 136–145)
WBC # BLD AUTO: 3.9 X10(3) UL (ref 4–11)

## 2022-05-13 PROCEDURE — 84703 CHORIONIC GONADOTROPIN ASSAY: CPT

## 2022-05-13 PROCEDURE — 80053 COMPREHEN METABOLIC PANEL: CPT

## 2022-05-13 PROCEDURE — 36591 DRAW BLOOD OFF VENOUS DEVICE: CPT

## 2022-05-13 PROCEDURE — 85025 COMPLETE CBC W/AUTO DIFF WBC: CPT

## 2022-05-24 ENCOUNTER — TELEPHONE (OUTPATIENT)
Dept: SURGERY | Facility: CLINIC | Age: 52
End: 2022-05-24

## 2022-05-24 ENCOUNTER — NURSE ONLY (OUTPATIENT)
Dept: HEMATOLOGY/ONCOLOGY | Facility: HOSPITAL | Age: 52
End: 2022-05-24
Attending: INTERNAL MEDICINE
Payer: COMMERCIAL

## 2022-05-24 VITALS
DIASTOLIC BLOOD PRESSURE: 80 MMHG | TEMPERATURE: 98 F | HEART RATE: 90 BPM | WEIGHT: 168.81 LBS | SYSTOLIC BLOOD PRESSURE: 121 MMHG | OXYGEN SATURATION: 99 % | RESPIRATION RATE: 18 BRPM | BODY MASS INDEX: 33.14 KG/M2 | HEIGHT: 60 IN

## 2022-05-24 DIAGNOSIS — Z17.0 MALIGNANT NEOPLASM OF OVERLAPPING SITES OF LEFT BREAST IN FEMALE, ESTROGEN RECEPTOR POSITIVE (HCC): Primary | ICD-10-CM

## 2022-05-24 DIAGNOSIS — Z78.0 POSTMENOPAUSAL: ICD-10-CM

## 2022-05-24 DIAGNOSIS — T50.905D ADVERSE EFFECT OF DRUG, SUBSEQUENT ENCOUNTER: ICD-10-CM

## 2022-05-24 DIAGNOSIS — C50.812 MALIGNANT NEOPLASM OF OVERLAPPING SITES OF LEFT BREAST IN FEMALE, ESTROGEN RECEPTOR POSITIVE (HCC): Primary | ICD-10-CM

## 2022-05-24 DIAGNOSIS — C77.9 REGIONAL LYMPH NODE METASTASIS PRESENT (HCC): ICD-10-CM

## 2022-05-24 DIAGNOSIS — R19.7 DIARRHEA, UNSPECIFIED TYPE: ICD-10-CM

## 2022-05-24 DIAGNOSIS — Z79.811 AROMATASE INHIBITOR USE: ICD-10-CM

## 2022-05-24 LAB
ALBUMIN SERPL-MCNC: 3.3 G/DL (ref 3.4–5)
ALBUMIN/GLOB SERPL: 1 {RATIO} (ref 1–2)
ALP LIVER SERPL-CCNC: 66 U/L
ALT SERPL-CCNC: 18 U/L
ANION GAP SERPL CALC-SCNC: 6 MMOL/L (ref 0–18)
AST SERPL-CCNC: 16 U/L (ref 15–37)
BASOPHILS # BLD AUTO: 0.04 X10(3) UL (ref 0–0.2)
BASOPHILS NFR BLD AUTO: 1.4 %
BILIRUB SERPL-MCNC: 0.6 MG/DL (ref 0.1–2)
BUN BLD-MCNC: 16 MG/DL (ref 7–18)
CALCIUM BLD-MCNC: 8.7 MG/DL (ref 8.5–10.1)
CHLORIDE SERPL-SCNC: 110 MMOL/L (ref 98–112)
CO2 SERPL-SCNC: 22 MMOL/L (ref 21–32)
CREAT BLD-MCNC: 1.19 MG/DL
EOSINOPHIL # BLD AUTO: 0.14 X10(3) UL (ref 0–0.7)
EOSINOPHIL NFR BLD AUTO: 4.9 %
ERYTHROCYTE [DISTWIDTH] IN BLOOD BY AUTOMATED COUNT: 12.3 %
FASTING STATUS PATIENT QL REPORTED: NO
GLOBULIN PLAS-MCNC: 3.3 G/DL (ref 2.8–4.4)
GLUCOSE BLD-MCNC: 118 MG/DL (ref 70–99)
HCT VFR BLD AUTO: 29.8 %
HGB BLD-MCNC: 10.1 G/DL
IMM GRANULOCYTES # BLD AUTO: 0.01 X10(3) UL (ref 0–1)
IMM GRANULOCYTES NFR BLD: 0.4 %
LYMPHOCYTES # BLD AUTO: 0.94 X10(3) UL (ref 1–4)
LYMPHOCYTES NFR BLD AUTO: 33 %
MAGNESIUM SERPL-MCNC: 2 MG/DL (ref 1.6–2.6)
MCH RBC QN AUTO: 31.7 PG (ref 26–34)
MCHC RBC AUTO-ENTMCNC: 33.9 G/DL (ref 31–37)
MCV RBC AUTO: 93.4 FL
MONOCYTES # BLD AUTO: 0.26 X10(3) UL (ref 0.1–1)
MONOCYTES NFR BLD AUTO: 9.1 %
NEUTROPHILS # BLD AUTO: 1.46 X10 (3) UL (ref 1.5–7.7)
NEUTROPHILS # BLD AUTO: 1.46 X10(3) UL (ref 1.5–7.7)
NEUTROPHILS NFR BLD AUTO: 51.2 %
OSMOLALITY SERPL CALC.SUM OF ELEC: 288 MOSM/KG (ref 275–295)
PHOSPHATE SERPL-MCNC: 3.3 MG/DL (ref 2.5–4.9)
PLATELET # BLD AUTO: 130 10(3)UL (ref 150–450)
POTASSIUM SERPL-SCNC: 4.2 MMOL/L (ref 3.5–5.1)
PROT SERPL-MCNC: 6.6 G/DL (ref 6.4–8.2)
RBC # BLD AUTO: 3.19 X10(6)UL
SODIUM SERPL-SCNC: 138 MMOL/L (ref 136–145)
WBC # BLD AUTO: 2.9 X10(3) UL (ref 4–11)

## 2022-05-24 PROCEDURE — 83735 ASSAY OF MAGNESIUM: CPT

## 2022-05-24 PROCEDURE — 96374 THER/PROPH/DIAG INJ IV PUSH: CPT

## 2022-05-24 PROCEDURE — 84100 ASSAY OF PHOSPHORUS: CPT

## 2022-05-24 PROCEDURE — 99214 OFFICE O/P EST MOD 30 MIN: CPT | Performed by: INTERNAL MEDICINE

## 2022-05-24 PROCEDURE — 80053 COMPREHEN METABOLIC PANEL: CPT

## 2022-05-24 PROCEDURE — 85025 COMPLETE CBC W/AUTO DIFF WBC: CPT

## 2022-05-24 RX ORDER — ONDANSETRON HYDROCHLORIDE 8 MG/1
8 TABLET, FILM COATED ORAL EVERY 8 HOURS PRN
Qty: 30 TABLET | Refills: 3 | Status: SHIPPED | OUTPATIENT
Start: 2022-05-24

## 2022-05-24 NOTE — PROGRESS NOTES
Education Record    Learner:  Patient    Disease / Diagnosis:L  breast ca    Anastrozole/ Abemaciclib/ Zometa. Barriers / Limitations:  None   Comments:    Method:  Discussion   Comments:    General Topics:  Plan of care reviewed   Comments:    Outcome:  Shows understanding   Comments:reviewed port maintenence and flushing requirements. Will discuss with MD  Pt started abemaciclib on 5/2/22. In the beginning a lot of diarrhea, nausea. Taking omeprazole that is helping. Refilled zofran. Using imodium, never needed to take 8 tabs. Maybe improving sl now. Some fatigue.

## 2022-05-24 NOTE — PROGRESS NOTES
Education Record    Learner:  Patient    Disease / Diagnosis: Malignant Neoplasm of Left Breast    Barriers / Limitations:  None   Comments:    Method:  Discussion   Comments:    General Topics:  Plan of care reviewed   Comments:    Outcome:  Shows understanding   Comments: Pt tolerated inf well. Will continue to monitor.

## 2022-06-06 ENCOUNTER — E-ADVICE (OUTPATIENT)
Dept: INTERNAL MEDICINE | Age: 52
End: 2022-06-06

## 2022-06-06 ENCOUNTER — TELEPHONE (OUTPATIENT)
Dept: SURGERY | Age: 52
End: 2022-06-06

## 2022-06-07 ENCOUNTER — IMAGING SERVICES (OUTPATIENT)
Dept: MAMMOGRAPHY | Age: 52
End: 2022-06-07
Attending: SURGERY

## 2022-06-07 DIAGNOSIS — C77.3 CARCINOMA OF LEFT BREAST METASTATIC TO AXILLARY LYMPH NODE (CMD): ICD-10-CM

## 2022-06-07 DIAGNOSIS — C50.912 CARCINOMA OF LEFT BREAST METASTATIC TO AXILLARY LYMPH NODE (CMD): ICD-10-CM

## 2022-06-07 DIAGNOSIS — C50.912 INFILTRATING DUCTAL CARCINOMA OF LEFT BREAST (CMD): ICD-10-CM

## 2022-06-07 PROCEDURE — 77062 BREAST TOMOSYNTHESIS BI: CPT | Performed by: RADIOLOGY

## 2022-06-07 PROCEDURE — 77066 DX MAMMO INCL CAD BI: CPT | Performed by: RADIOLOGY

## 2022-06-22 ENCOUNTER — NURSE ONLY (OUTPATIENT)
Dept: HEMATOLOGY/ONCOLOGY | Facility: HOSPITAL | Age: 52
End: 2022-06-22
Attending: INTERNAL MEDICINE
Payer: COMMERCIAL

## 2022-06-22 VITALS
HEART RATE: 103 BPM | DIASTOLIC BLOOD PRESSURE: 83 MMHG | HEIGHT: 60 IN | TEMPERATURE: 98 F | SYSTOLIC BLOOD PRESSURE: 128 MMHG | BODY MASS INDEX: 33.38 KG/M2 | RESPIRATION RATE: 16 BRPM | WEIGHT: 170 LBS | OXYGEN SATURATION: 98 %

## 2022-06-22 DIAGNOSIS — R19.7 DIARRHEA, UNSPECIFIED TYPE: ICD-10-CM

## 2022-06-22 DIAGNOSIS — Z17.0 MALIGNANT NEOPLASM OF OVERLAPPING SITES OF LEFT BREAST IN FEMALE, ESTROGEN RECEPTOR POSITIVE (HCC): Primary | ICD-10-CM

## 2022-06-22 DIAGNOSIS — C77.9 REGIONAL LYMPH NODE METASTASIS PRESENT (HCC): ICD-10-CM

## 2022-06-22 DIAGNOSIS — Z78.0 POSTMENOPAUSAL: ICD-10-CM

## 2022-06-22 DIAGNOSIS — Z17.0 MALIGNANT NEOPLASM OF OVERLAPPING SITES OF LEFT BREAST IN FEMALE, ESTROGEN RECEPTOR POSITIVE (HCC): ICD-10-CM

## 2022-06-22 DIAGNOSIS — Z79.811 AROMATASE INHIBITOR USE: ICD-10-CM

## 2022-06-22 DIAGNOSIS — C50.812 MALIGNANT NEOPLASM OF OVERLAPPING SITES OF LEFT BREAST IN FEMALE, ESTROGEN RECEPTOR POSITIVE (HCC): Primary | ICD-10-CM

## 2022-06-22 DIAGNOSIS — D64.9 ANEMIA, UNSPECIFIED TYPE: ICD-10-CM

## 2022-06-22 DIAGNOSIS — C50.812 MALIGNANT NEOPLASM OF OVERLAPPING SITES OF LEFT BREAST IN FEMALE, ESTROGEN RECEPTOR POSITIVE (HCC): ICD-10-CM

## 2022-06-22 DIAGNOSIS — T50.905D ADVERSE EFFECT OF DRUG, SUBSEQUENT ENCOUNTER: ICD-10-CM

## 2022-06-22 LAB
ALBUMIN SERPL-MCNC: 3.8 G/DL (ref 3.4–5)
ALBUMIN/GLOB SERPL: 1.2 {RATIO} (ref 1–2)
ALP LIVER SERPL-CCNC: 68 U/L
ALT SERPL-CCNC: 17 U/L
ANION GAP SERPL CALC-SCNC: 5 MMOL/L (ref 0–18)
AST SERPL-CCNC: 20 U/L (ref 15–37)
BASOPHILS # BLD AUTO: 0.08 X10(3) UL (ref 0–0.2)
BASOPHILS NFR BLD AUTO: 1.8 %
BILIRUB SERPL-MCNC: 0.8 MG/DL (ref 0.1–2)
BUN BLD-MCNC: 21 MG/DL (ref 7–18)
CALCIUM BLD-MCNC: 8.8 MG/DL (ref 8.5–10.1)
CHLORIDE SERPL-SCNC: 107 MMOL/L (ref 98–112)
CO2 SERPL-SCNC: 23 MMOL/L (ref 21–32)
CREAT BLD-MCNC: 1.19 MG/DL
EOSINOPHIL # BLD AUTO: 0.18 X10(3) UL (ref 0–0.7)
EOSINOPHIL NFR BLD AUTO: 4 %
ERYTHROCYTE [DISTWIDTH] IN BLOOD BY AUTOMATED COUNT: 15.9 %
GLOBULIN PLAS-MCNC: 3.3 G/DL (ref 2.8–4.4)
GLUCOSE BLD-MCNC: 103 MG/DL (ref 70–99)
HCT VFR BLD AUTO: 29.1 %
HGB BLD-MCNC: 10.3 G/DL
IMM GRANULOCYTES # BLD AUTO: 0.01 X10(3) UL (ref 0–1)
IMM GRANULOCYTES NFR BLD: 0.2 %
LYMPHOCYTES # BLD AUTO: 1.22 X10(3) UL (ref 1–4)
LYMPHOCYTES NFR BLD AUTO: 27 %
MCH RBC QN AUTO: 33.7 PG (ref 26–34)
MCHC RBC AUTO-ENTMCNC: 35.4 G/DL (ref 31–37)
MCV RBC AUTO: 95.1 FL
MONOCYTES # BLD AUTO: 0.65 X10(3) UL (ref 0.1–1)
MONOCYTES NFR BLD AUTO: 14.4 %
NEUTROPHILS # BLD AUTO: 2.38 X10 (3) UL (ref 1.5–7.7)
NEUTROPHILS # BLD AUTO: 2.38 X10(3) UL (ref 1.5–7.7)
NEUTROPHILS NFR BLD AUTO: 52.6 %
OSMOLALITY SERPL CALC.SUM OF ELEC: 283 MOSM/KG (ref 275–295)
PLATELET # BLD AUTO: 231 10(3)UL (ref 150–450)
POTASSIUM SERPL-SCNC: 4.6 MMOL/L (ref 3.5–5.1)
PROT SERPL-MCNC: 7.1 G/DL (ref 6.4–8.2)
RBC # BLD AUTO: 3.06 X10(6)UL
SODIUM SERPL-SCNC: 135 MMOL/L (ref 136–145)
WBC # BLD AUTO: 4.5 X10(3) UL (ref 4–11)

## 2022-06-22 PROCEDURE — 85045 AUTOMATED RETICULOCYTE COUNT: CPT

## 2022-06-22 PROCEDURE — 85025 COMPLETE CBC W/AUTO DIFF WBC: CPT

## 2022-06-22 PROCEDURE — 82728 ASSAY OF FERRITIN: CPT

## 2022-06-22 PROCEDURE — 80053 COMPREHEN METABOLIC PANEL: CPT

## 2022-06-22 PROCEDURE — 36591 DRAW BLOOD OFF VENOUS DEVICE: CPT

## 2022-06-22 PROCEDURE — 99214 OFFICE O/P EST MOD 30 MIN: CPT | Performed by: INTERNAL MEDICINE

## 2022-06-22 PROCEDURE — 82607 VITAMIN B-12: CPT

## 2022-06-22 PROCEDURE — 83550 IRON BINDING TEST: CPT

## 2022-06-22 PROCEDURE — 83540 ASSAY OF IRON: CPT

## 2022-06-22 NOTE — PROGRESS NOTES
Education Record    Learner:  Patient    Disease / Diagnosis:breast cancer    Barriers / Limitations:  None   Comments:    Method:  Discussion   Comments:    General Topics:  Plan of care reviewed   Comments:    Outcome:  Shows understanding   Comments:  Pt with lots of abdominal pain and diarrhea with verzenio. Has held since 6/17/22. Reports feeling better within 2 days of stopping. Reports the bloating and diarrhea and nausea. Had altered taste, and taste aversion. Very limited diet. Taking nexium daily, stomach not quite right. She is moving and lots of stress.

## 2022-06-23 LAB
DEPRECATED HBV CORE AB SER IA-ACNC: 181.4 NG/ML
HGB RETIC QN AUTO: 39.6 PG (ref 28.2–36.6)
IMM RETICS NFR: 0.18 RATIO (ref 0.1–0.3)
IRON SATN MFR SERPL: 24 %
IRON SERPL-MCNC: 70 UG/DL
RETICS # AUTO: 101.9 X10(3) UL (ref 22.5–147.5)
RETICS/RBC NFR AUTO: 3.3 %
TIBC SERPL-MCNC: 286 UG/DL (ref 240–450)
TRANSFERRIN SERPL-MCNC: 192 MG/DL (ref 200–360)
VIT B12 SERPL-MCNC: 284 PG/ML (ref 193–986)

## 2022-06-24 ENCOUNTER — TELEPHONE (OUTPATIENT)
Dept: HEMATOLOGY/ONCOLOGY | Facility: HOSPITAL | Age: 52
End: 2022-06-24

## 2022-06-24 NOTE — TELEPHONE ENCOUNTER
Rohan Patel MD  P Edw Otoniel Mayorga Rns  Pls call, B12 bit low. Kael OTC oral K67 and also folic acid, follow instructions on bottle     Pt informed.

## 2022-06-28 ENCOUNTER — APPOINTMENT (OUTPATIENT)
Dept: INTERNAL MEDICINE | Age: 52
End: 2022-06-28

## 2022-07-05 ENCOUNTER — OFFICE VISIT (OUTPATIENT)
Dept: SURGERY | Age: 52
End: 2022-07-05

## 2022-07-05 VITALS — HEIGHT: 60 IN | BODY MASS INDEX: 32.98 KG/M2 | WEIGHT: 168 LBS

## 2022-07-05 DIAGNOSIS — C50.919 INFILTRATING DUCTAL CARCINOMA (CMD): Primary | ICD-10-CM

## 2022-07-05 DIAGNOSIS — R92.30 INCONCLUSIVE MAMMOGRAPHY DUE TO DENSE BREASTS: ICD-10-CM

## 2022-07-05 DIAGNOSIS — C77.3 CARCINOMA OF LEFT BREAST METASTATIC TO AXILLARY LYMPH NODE (CMD): ICD-10-CM

## 2022-07-05 DIAGNOSIS — C50.912 CARCINOMA OF LEFT BREAST METASTATIC TO AXILLARY LYMPH NODE (CMD): ICD-10-CM

## 2022-07-05 DIAGNOSIS — N64.89 POSTOPERATIVE BREAST ASYMMETRY: ICD-10-CM

## 2022-07-05 DIAGNOSIS — I97.2 LYMPHEDEMA SYNDROME, POSTMASTECTOMY: ICD-10-CM

## 2022-07-05 DIAGNOSIS — Z79.811 USE OF ANASTROZOLE: ICD-10-CM

## 2022-07-05 DIAGNOSIS — R92.2 INCONCLUSIVE MAMMOGRAPHY DUE TO DENSE BREASTS: ICD-10-CM

## 2022-07-05 PROCEDURE — 99214 OFFICE O/P EST MOD 30 MIN: CPT | Performed by: SURGERY

## 2022-07-08 ENCOUNTER — TELEPHONE (OUTPATIENT)
Dept: HEMATOLOGY/ONCOLOGY | Facility: HOSPITAL | Age: 52
End: 2022-07-08

## 2022-07-12 ENCOUNTER — TELEPHONE (OUTPATIENT)
Dept: HEMATOLOGY/ONCOLOGY | Facility: HOSPITAL | Age: 52
End: 2022-07-12

## 2022-07-12 ENCOUNTER — TELEPHONE (OUTPATIENT)
Dept: SURGERY | Age: 52
End: 2022-07-12

## 2022-07-12 NOTE — TELEPHONE ENCOUNTER
Nghia called regarding Verzenio 100 mg. The insurance company said she needs a new prior authorization because the quantity is larger.

## 2022-07-27 ENCOUNTER — APPOINTMENT (OUTPATIENT)
Dept: SURGERY | Age: 52
End: 2022-07-27

## 2022-07-27 ENCOUNTER — OFFICE VISIT (OUTPATIENT)
Dept: SURGERY | Age: 52
End: 2022-07-27

## 2022-07-27 DIAGNOSIS — Z95.828 PORT-A-CATH IN PLACE: Primary | ICD-10-CM

## 2022-07-27 PROCEDURE — 36590 REMOVAL TUNNELED CV CATH: CPT | Performed by: NURSE PRACTITIONER

## 2022-07-28 ENCOUNTER — TELEPHONE (OUTPATIENT)
Dept: HEMATOLOGY/ONCOLOGY | Facility: HOSPITAL | Age: 52
End: 2022-07-28

## 2022-08-16 ENCOUNTER — OFFICE VISIT (OUTPATIENT)
Dept: SURGERY | Age: 52
End: 2022-08-16

## 2022-08-16 DIAGNOSIS — Z85.3 HISTORY OF BREAST CANCER IN FEMALE: Primary | ICD-10-CM

## 2022-08-16 DIAGNOSIS — N64.89 BREAST EDEMA: ICD-10-CM

## 2022-08-16 DIAGNOSIS — Z92.3 HISTORY OF EXTERNAL BEAM RADIATION THERAPY: ICD-10-CM

## 2022-08-16 PROCEDURE — 99213 OFFICE O/P EST LOW 20 MIN: CPT | Performed by: NURSE PRACTITIONER

## 2022-08-17 ENCOUNTER — APPOINTMENT (OUTPATIENT)
Dept: HEMATOLOGY/ONCOLOGY | Facility: HOSPITAL | Age: 52
End: 2022-08-17
Attending: INTERNAL MEDICINE
Payer: COMMERCIAL

## 2022-08-17 ENCOUNTER — TELEPHONE (OUTPATIENT)
Dept: SURGERY | Age: 52
End: 2022-08-17

## 2022-08-17 ENCOUNTER — EXTERNAL RECORD (OUTPATIENT)
Dept: HEALTH INFORMATION MANAGEMENT | Facility: OTHER | Age: 52
End: 2022-08-17

## 2022-08-17 ENCOUNTER — SOCIAL WORK SERVICES (OUTPATIENT)
Dept: HEMATOLOGY/ONCOLOGY | Facility: HOSPITAL | Age: 52
End: 2022-08-17

## 2022-08-17 VITALS
HEART RATE: 78 BPM | DIASTOLIC BLOOD PRESSURE: 101 MMHG | WEIGHT: 173 LBS | TEMPERATURE: 97 F | OXYGEN SATURATION: 100 % | SYSTOLIC BLOOD PRESSURE: 154 MMHG | BODY MASS INDEX: 34 KG/M2 | RESPIRATION RATE: 18 BRPM

## 2022-08-17 DIAGNOSIS — C50.812 MALIGNANT NEOPLASM OF OVERLAPPING SITES OF LEFT BREAST IN FEMALE, ESTROGEN RECEPTOR POSITIVE (HCC): ICD-10-CM

## 2022-08-17 DIAGNOSIS — C77.9 REGIONAL LYMPH NODE METASTASIS PRESENT (HCC): ICD-10-CM

## 2022-08-17 DIAGNOSIS — T45.1X5A CHEMOTHERAPY-INDUCED NEUROPATHY (HCC): ICD-10-CM

## 2022-08-17 DIAGNOSIS — T50.905D ADVERSE EFFECT OF DRUG, SUBSEQUENT ENCOUNTER: Primary | ICD-10-CM

## 2022-08-17 DIAGNOSIS — Z17.0 MALIGNANT NEOPLASM OF OVERLAPPING SITES OF LEFT BREAST IN FEMALE, ESTROGEN RECEPTOR POSITIVE (HCC): ICD-10-CM

## 2022-08-17 DIAGNOSIS — G62.0 CHEMOTHERAPY-INDUCED NEUROPATHY (HCC): ICD-10-CM

## 2022-08-17 LAB
ALBUMIN SERPL-MCNC: 3.6 G/DL (ref 3.4–5)
ALBUMIN/GLOB SERPL: 1.2 {RATIO} (ref 1–2)
ALP LIVER SERPL-CCNC: 62 U/L
ALT SERPL-CCNC: 22 U/L
ANION GAP SERPL CALC-SCNC: 3 MMOL/L (ref 0–18)
AST SERPL-CCNC: 22 U/L (ref 15–37)
BASOPHILS # BLD AUTO: 0.02 X10(3) UL (ref 0–0.2)
BASOPHILS NFR BLD AUTO: 0.5 %
BILIRUB SERPL-MCNC: 0.6 MG/DL (ref 0.1–2)
BUN BLD-MCNC: 18 MG/DL (ref 7–18)
CALCIUM BLD-MCNC: 8.7 MG/DL (ref 8.5–10.1)
CHLORIDE SERPL-SCNC: 111 MMOL/L (ref 98–112)
CO2 SERPL-SCNC: 22 MMOL/L (ref 21–32)
CREAT BLD-MCNC: 0.99 MG/DL
EOSINOPHIL # BLD AUTO: 0.15 X10(3) UL (ref 0–0.7)
EOSINOPHIL NFR BLD AUTO: 3.9 %
ERYTHROCYTE [DISTWIDTH] IN BLOOD BY AUTOMATED COUNT: 11.4 %
GFR SERPLBLD BASED ON 1.73 SQ M-ARVRAT: 68 ML/MIN/1.73M2 (ref 60–?)
GLOBULIN PLAS-MCNC: 3 G/DL (ref 2.8–4.4)
GLUCOSE BLD-MCNC: 114 MG/DL (ref 70–99)
HCT VFR BLD AUTO: 33.2 %
HGB BLD-MCNC: 11.3 G/DL
IMM GRANULOCYTES # BLD AUTO: 0.01 X10(3) UL (ref 0–1)
IMM GRANULOCYTES NFR BLD: 0.3 %
LYMPHOCYTES # BLD AUTO: 1.5 X10(3) UL (ref 1–4)
LYMPHOCYTES NFR BLD AUTO: 38.7 %
MCH RBC QN AUTO: 33.5 PG (ref 26–34)
MCHC RBC AUTO-ENTMCNC: 34 G/DL (ref 31–37)
MCV RBC AUTO: 98.5 FL
MONOCYTES # BLD AUTO: 0.35 X10(3) UL (ref 0.1–1)
MONOCYTES NFR BLD AUTO: 9 %
NEUTROPHILS # BLD AUTO: 1.85 X10 (3) UL (ref 1.5–7.7)
NEUTROPHILS # BLD AUTO: 1.85 X10(3) UL (ref 1.5–7.7)
NEUTROPHILS NFR BLD AUTO: 47.6 %
OSMOLALITY SERPL CALC.SUM OF ELEC: 285 MOSM/KG (ref 275–295)
PLATELET # BLD AUTO: 139 10(3)UL (ref 150–450)
POTASSIUM SERPL-SCNC: 4.1 MMOL/L (ref 3.5–5.1)
PROT SERPL-MCNC: 6.6 G/DL (ref 6.4–8.2)
RBC # BLD AUTO: 3.37 X10(6)UL
SODIUM SERPL-SCNC: 136 MMOL/L (ref 136–145)
WBC # BLD AUTO: 3.9 X10(3) UL (ref 4–11)

## 2022-08-17 PROCEDURE — 99214 OFFICE O/P EST MOD 30 MIN: CPT | Performed by: INTERNAL MEDICINE

## 2022-08-17 NOTE — PROGRESS NOTES
spoke with patient and completed [facility] paperwork, faxing to Hyacinth Krishna at J#[phone], and sending copy to patient via [address]

## 2022-08-17 NOTE — PROGRESS NOTES
Education Record    Learner:  Patient    Disease / Diagnosis:breast cancer      Barriers / Limitations:  None   Comments:    Method:  Discussion   Comments:    General Topics:  Plan of care reviewed   Comments:    Outcome:  Shows understanding   Comments:  abemaciclib 100mg BID, Anastrazole     Supplement caused allergic reaction. temporarily off verzenio due to reaction. Tolerating lower dose, better, less abdominal pain and diarrhea. Reports having breast lymphadema. Ordered pump for tx. Per Dr Annalee Hawkins.

## 2022-08-18 ENCOUNTER — OFFICE VISIT (OUTPATIENT)
Dept: HEMATOLOGY/ONCOLOGY | Facility: HOSPITAL | Age: 52
End: 2022-08-18
Attending: INTERNAL MEDICINE
Payer: COMMERCIAL

## 2022-08-18 ENCOUNTER — EXTERNAL RECORD (OUTPATIENT)
Dept: HEALTH INFORMATION MANAGEMENT | Facility: OTHER | Age: 52
End: 2022-08-18

## 2022-08-18 DIAGNOSIS — Z08 ENCOUNTER FOR FOLLOW-UP EXAMINATION AFTER COMPLETED TREATMENT FOR MALIGNANT NEOPLASM: ICD-10-CM

## 2022-08-18 DIAGNOSIS — Z85.3 PERSONAL HISTORY OF BREAST CANCER: Primary | ICD-10-CM

## 2022-08-18 DIAGNOSIS — I10 PRIMARY HYPERTENSION: ICD-10-CM

## 2022-08-18 DIAGNOSIS — Z71.9 COUNSELING, UNSPECIFIED: ICD-10-CM

## 2022-08-18 PROCEDURE — 99417 PROLNG OP E/M EACH 15 MIN: CPT | Performed by: NURSE PRACTITIONER

## 2022-08-18 PROCEDURE — 99215 OFFICE O/P EST HI 40 MIN: CPT | Performed by: NURSE PRACTITIONER

## 2022-08-18 NOTE — PROGRESS NOTES
I met with Maddy Villafuerte for a Survivorship Clinic visit to provide a survivorship care plan (SCP) and information related to post-treatment care. She had a left lumpectomy with axillary node dissection with Dr. Hermann Hagen on 6/29/2021. Pathology showed 7 of 8 positive lymph nodes, G2, ER+, CO+, HER2-, Stage IB). She received adjuvant chemotherapy with Dr. Dean Francois with Doxorubicin with Cyclophosphamide x 4 cycles, followed by Paclitaxel x 4 cycles, completing on 12/10/2021. She then had radiation therapy to the left breast and SCF with Dr. Malcom Chatterjee which completed on 2/24/2022. She started on Anastrozole for planned 10 years in 3/2022. She started on Zoledronic acid on 5/24/2022, for every 6 months, with next dose due in November. She started on Abemaciclib in 5/2022 for 2 years. She had a recent dose reduction due to abdominal symptoms with diarrhea. She also recently held her dosing for 48 hours after she developed hives and facial flushing. She had also just started a Vitamin B complex with Vitamin C. She stopped this supplement and the hives and flushing resolved. She then resumed the Abemaciclib and has been feeling better with no further allergic symptoms. (See Oncology Treatment Summary SCP for details). Current condition/issues: Maddy Villafuerte takes the Anastrozole daily and notes mild joint stiffness that improves with movement. She denies current hotflashes. She had a normal bone density done on 4/2022. She is not currently on Vitamin D, but had completed the 12 weeks of 50,000U/week. She takes calcium with TUMS. She tolerated first dose Zoledronic acid without side effects. She is currently taking 100mg BID of Abemaciclib. She notes ongoing fatigue, but attributes this to back to school stress and a recent move. She saw Dr. Jennifer Stone yesterday for follow-up and labwork. She is concerned that her hair has stopped growing. She has intermittent nausea and takes Ondansetron. She vomited once over the weekend. She notes persistent neuropathy in her feet, but her fingers have improved. She had been seen post-op by Lymphedema PT for 12 visits for cording and LUE swelling. This had improved, but since radiation, Dr. Dianelys Bell noted an increase in the left breast and LUE. She has started using a compression pump with vest and sleeve. She is to use this 60\"/day for 1-2 months. Dr. Dianelys Bell will determine if she needs further PT once she has used this garment for the full time. She also wears a left compression sleeve prn. She has noted asymmetry with her breasts and saw Dr. Chuy Muro. She plans to have reconstruction next summer when off of school. Kodak Byrne notes ongoing fatigue, but does not sleep well and does limited intentional exercise. She is in good spirits, but does admit to having anxiety, especially when summer is over and returning to school. She does have a therapist she sees prn. 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 She enjoys being outside and active and hopes to begin increasing exercise each day. She likes to bike, walk and swim. She is interested in losing weight. Current BMI is 33. She currently eats a plant based diet. Survivorship Care Plan and letter: She was provided a hard copy of the SCP and a letter that outlined the purpose of the visit and plan. We reviewed all elements of both documents. She is aware that a letter and SCP will be sent to her PCP, Dr. Ginette Liao. Reviewed Cancer Surveillance (office visits, imaging, bone density) and that Dr. Hollis Bustamante will oversee this care with Dr. Caden Paiz. Kodak Byrne had recent bilateral mammogram in June and an MRI was ordered for her to get in December. She will see Dr. Jaquelin Butt again on 11/18 and will get Zoledronic acid on that date. Next bone density will be due 4/2024.        Reviewed Schedule of other clinic visits with Primary Care and specialists: Thi Henson will continue to manage all general health care recommended for age and gender including cancer screening tests. She is up-to-date with colon screening. She is overdue for cervical screening and is interested in getting a new gynecologist.  She was encouraged to continue to see specialists at usual intervals. Reviewed concerning symptoms that she should report to any Provider. Reviewed possible late and long-term effects related to the treatment that was received. We reviewed management of hormone related side effects and care of the surgical arm. Reviewed common cancer survivor issues and resources available. I reviewed some resources on weight loss, exercise options and psychosocial services at local facilities. Various survivorship resources were provided to use going forward as needed (see below). Reviewed Lifestyle/behaviors that can affect ongoing health, including the risk for the cancer coming back or developing another cancer. We reviewed importance of physical activity with weight bearing exercise and a plant based diet. We reviewed skin care and sun safety.      Jony Peoples received a take-home folder that included the following survivorship resources:   -SCP and patient letter  -Breast Survivorship Guide   -[facility] - during pandemic, virtual programming available and includes: nutrition and exercise classes, mind/body programs, education, support groups  -[facility] [place] in [place] pandemic, virtual programming available and includes: education, support groups, special programs, nutrition and exercise classes, movement classes, mind/body programs, survivorship programs, individual counseling  -[facility] in [place] -education, support groups, special programs, nutrition and exercise classes, movement classes, mind/body programs,  survivorship programs, individual counseling  -[facility]  -Jump Start lifestyle program  -Healthy Weight, Healthy You  -Cosmetic resources  -ChooseMyPlate.gov handout-nutrition, physical activity  -ACS Cancer Screening Guidelines  -Websites: [address] for your Life, Living Beyond Breast Cancer, [address] [place] for [address] was given the opportunity to ask questions. She had a few questions and verbalized understanding of the information we discussed today. She appears to be doing very well after this long, ongoing treatment and has a good understanding of her plan of care. My total time spent caring for the patient on the day of the encounter: 110 minutes (10 minutes reviewing chart, 90 minutes of face to face counseling regarding survivorship education, review of care plan and additional resources and 10 minutes of documentation). She was encouraged to call with any further questions.    Maryam Cerna APRN

## 2022-08-22 RX ORDER — LOSARTAN POTASSIUM 50 MG/1
TABLET ORAL
Qty: 90 TABLET | Refills: 0 | Status: SHIPPED | OUTPATIENT
Start: 2022-08-22 | End: 2022-12-12

## 2022-08-30 ENCOUNTER — CLINICAL ABSTRACT (OUTPATIENT)
Dept: HEALTH INFORMATION MANAGEMENT | Facility: OTHER | Age: 52
End: 2022-08-30

## 2022-08-30 ENCOUNTER — TELEPHONE (OUTPATIENT)
Dept: HEMATOLOGY/ONCOLOGY | Facility: HOSPITAL | Age: 52
End: 2022-08-30

## 2022-09-06 ENCOUNTER — OFFICE VISIT (OUTPATIENT)
Dept: INTERNAL MEDICINE | Age: 52
End: 2022-09-06

## 2022-09-06 VITALS
OXYGEN SATURATION: 99 % | SYSTOLIC BLOOD PRESSURE: 126 MMHG | HEIGHT: 60 IN | HEART RATE: 80 BPM | WEIGHT: 171 LBS | BODY MASS INDEX: 33.57 KG/M2 | TEMPERATURE: 98.3 F | RESPIRATION RATE: 16 BRPM | DIASTOLIC BLOOD PRESSURE: 88 MMHG

## 2022-09-06 DIAGNOSIS — Z17.0 MALIGNANT NEOPLASM OF OVERLAPPING SITES OF LEFT BREAST IN FEMALE, ESTROGEN RECEPTOR POSITIVE (HCC): Primary | ICD-10-CM

## 2022-09-06 DIAGNOSIS — C50.812 MALIGNANT NEOPLASM OF OVERLAPPING SITES OF LEFT BREAST IN FEMALE, ESTROGEN RECEPTOR POSITIVE (HCC): Primary | ICD-10-CM

## 2022-09-06 DIAGNOSIS — J45.20 MILD INTERMITTENT ASTHMA WITHOUT COMPLICATION: ICD-10-CM

## 2022-09-06 DIAGNOSIS — I10 PRIMARY HYPERTENSION: Primary | ICD-10-CM

## 2022-09-06 PROCEDURE — 3079F DIAST BP 80-89 MM HG: CPT | Performed by: INTERNAL MEDICINE

## 2022-09-06 PROCEDURE — 99213 OFFICE O/P EST LOW 20 MIN: CPT | Performed by: INTERNAL MEDICINE

## 2022-09-06 PROCEDURE — 3074F SYST BP LT 130 MM HG: CPT | Performed by: INTERNAL MEDICINE

## 2022-09-06 RX ORDER — ANASTROZOLE 1 MG/1
1 TABLET ORAL DAILY
COMMUNITY
Start: 2022-03-15

## 2022-09-06 RX ORDER — ABEMACICLIB 100 MG/1
TABLET ORAL
COMMUNITY
Start: 2022-08-22

## 2022-09-06 SDOH — HEALTH STABILITY: PHYSICAL HEALTH: ON AVERAGE, HOW MANY MINUTES DO YOU ENGAGE IN EXERCISE AT THIS LEVEL?: 0 MIN

## 2022-09-06 SDOH — HEALTH STABILITY: PHYSICAL HEALTH: ON AVERAGE, HOW MANY DAYS PER WEEK DO YOU ENGAGE IN MODERATE TO STRENUOUS EXERCISE (LIKE A BRISK WALK)?: 0 DAYS

## 2022-09-06 ASSESSMENT — PAIN SCALES - GENERAL: PAINLEVEL: 0

## 2022-09-28 NOTE — PROGRESS NOTES
[facility] Radiation Treatment Management Note 1-5    Patient:  Marquis Lagos  Age:  46year old  Visit Diagnosis:    1.  Malignant neoplasm of overlapping sites of left breast in female, estrogen receptor positive ([facility] [address]      Primary 6

## 2022-10-03 RX ORDER — ANASTROZOLE 1 MG/1
TABLET ORAL
Qty: 30 TABLET | Refills: 6 | Status: SHIPPED | OUTPATIENT
Start: 2022-10-03 | End: 2023-04-17

## 2022-10-25 ENCOUNTER — WALK IN (OUTPATIENT)
Dept: URGENT CARE | Age: 52
End: 2022-10-25

## 2022-10-25 ENCOUNTER — TELEPHONE (OUTPATIENT)
Dept: HEMATOLOGY/ONCOLOGY | Facility: HOSPITAL | Age: 52
End: 2022-10-25

## 2022-10-25 VITALS
TEMPERATURE: 100.5 F | DIASTOLIC BLOOD PRESSURE: 78 MMHG | HEART RATE: 93 BPM | WEIGHT: 170 LBS | RESPIRATION RATE: 20 BRPM | OXYGEN SATURATION: 97 % | SYSTOLIC BLOOD PRESSURE: 120 MMHG | BODY MASS INDEX: 33.2 KG/M2

## 2022-10-25 DIAGNOSIS — Z20.822 SUSPECTED COVID-19 VIRUS INFECTION: Primary | ICD-10-CM

## 2022-10-25 DIAGNOSIS — R50.9 FEVER, UNSPECIFIED FEVER CAUSE: ICD-10-CM

## 2022-10-25 LAB
FLUAV AG UPPER RESP QL IA.RAPID: NEGATIVE
FLUBV AG UPPER RESP QL IA.RAPID: NEGATIVE
INTERNAL PROCEDURAL CONTROLS ACCEPTABLE: YES
INTERNAL PROCEDURAL CONTROLS ACCEPTABLE: YES
SARS-COV+SARS-COV-2 AG RESP QL IA.RAPID: NOT DETECTED
TEST LOT EXPIRATION DATE: NORMAL
TEST LOT EXPIRATION DATE: NORMAL
TEST LOT NUMBER: NORMAL
TEST LOT NUMBER: NORMAL

## 2022-10-25 PROCEDURE — 99214 OFFICE O/P EST MOD 30 MIN: CPT | Performed by: FAMILY MEDICINE

## 2022-10-25 PROCEDURE — 87426 SARSCOV CORONAVIRUS AG IA: CPT | Performed by: FAMILY MEDICINE

## 2022-10-25 PROCEDURE — 3074F SYST BP LT 130 MM HG: CPT | Performed by: FAMILY MEDICINE

## 2022-10-25 PROCEDURE — 87804 INFLUENZA ASSAY W/OPTIC: CPT | Performed by: FAMILY MEDICINE

## 2022-10-25 PROCEDURE — 3078F DIAST BP <80 MM HG: CPT | Performed by: FAMILY MEDICINE

## 2022-10-25 ASSESSMENT — PAIN SCALES - GENERAL: PAINLEVEL: 0

## 2022-10-25 NOTE — TELEPHONE ENCOUNTER
Patient called she was having some fevers, cough and just not feeling well she went to [facility] and tested for Covid and Flu and tested negative, they felt she just had a virus. Anastrozole, Abemaciclib    Fever - Grade 1 - had fever of 102 this afternoon and took Tylenol  Denies sob or chest pain, has allergies and uses Zertec  Lungs at urgent care were clear, cough occasional with clear nasal dripping, instructed in use of OTC cough drops, Mucinex or other products that have helped her in the past.  Monitor for worsening symptoms to call. Denies n/v/d, has not eaten much yet today, instructed to push fluids, small frequent meals. Slight light headedness, denies H/A. Feels from fever. She wanted to be sure to update MD and see if she needs anything else. Please advise.

## 2022-10-25 NOTE — TELEPHONE ENCOUNTER
Contacted the patient. Nothing to add, may hold her abemaciclib if she is feeling ill. Continue to monitor, and call if symtoms don't lessen. Verbalized understanding and comfortable with the plan.

## 2022-11-01 ENCOUNTER — HOSPITAL ENCOUNTER (OUTPATIENT)
Age: 52
Discharge: HOME | End: 2022-11-01
Payer: COMMERCIAL

## 2022-11-01 VITALS
HEART RATE: 71 BPM | OXYGEN SATURATION: 98 % | RESPIRATION RATE: 18 BRPM | WEIGHT: 150 LBS | HEIGHT: 60 IN | DIASTOLIC BLOOD PRESSURE: 77 MMHG | BODY MASS INDEX: 29.45 KG/M2 | TEMPERATURE: 98 F | SYSTOLIC BLOOD PRESSURE: 113 MMHG

## 2022-11-01 DIAGNOSIS — J32.9 SINOBRONCHITIS: Primary | ICD-10-CM

## 2022-11-01 DIAGNOSIS — J40 SINOBRONCHITIS: Primary | ICD-10-CM

## 2022-11-01 PROCEDURE — 99213 OFFICE O/P EST LOW 20 MIN: CPT | Performed by: NURSE PRACTITIONER

## 2022-11-01 RX ORDER — METHYLPREDNISOLONE 4 MG/1
TABLET ORAL
Qty: 1 EACH | Refills: 0 | Status: SHIPPED | OUTPATIENT
Start: 2022-11-01 | End: 2022-11-01

## 2022-11-01 RX ORDER — METHYLPREDNISOLONE 4 MG/1
TABLET ORAL
Qty: 1 EACH | Refills: 0 | Status: SHIPPED | OUTPATIENT
Start: 2022-11-01

## 2022-11-01 RX ORDER — CEFDINIR 300 MG/1
300 CAPSULE ORAL 2 TIMES DAILY
Qty: 20 CAPSULE | Refills: 0 | Status: SHIPPED | OUTPATIENT
Start: 2022-11-01 | End: 2022-11-11

## 2022-11-01 RX ORDER — CEFDINIR 300 MG/1
300 CAPSULE ORAL 2 TIMES DAILY
Qty: 20 CAPSULE | Refills: 0 | Status: SHIPPED | OUTPATIENT
Start: 2022-11-01 | End: 2022-11-01

## 2022-11-11 ENCOUNTER — TELEPHONE (OUTPATIENT)
Dept: HEMATOLOGY/ONCOLOGY | Facility: HOSPITAL | Age: 52
End: 2022-11-11

## 2022-11-18 ENCOUNTER — EXTERNAL RECORD (OUTPATIENT)
Dept: HEALTH INFORMATION MANAGEMENT | Facility: OTHER | Age: 52
End: 2022-11-18

## 2022-11-18 ENCOUNTER — TELEPHONE (OUTPATIENT)
Dept: HEMATOLOGY/ONCOLOGY | Facility: HOSPITAL | Age: 52
End: 2022-11-18

## 2022-11-18 ENCOUNTER — OFFICE VISIT (OUTPATIENT)
Dept: HEMATOLOGY/ONCOLOGY | Facility: HOSPITAL | Age: 52
End: 2022-11-18
Attending: INTERNAL MEDICINE
Payer: COMMERCIAL

## 2022-11-18 VITALS
SYSTOLIC BLOOD PRESSURE: 133 MMHG | BODY MASS INDEX: 34.16 KG/M2 | WEIGHT: 174 LBS | OXYGEN SATURATION: 100 % | RESPIRATION RATE: 16 BRPM | DIASTOLIC BLOOD PRESSURE: 83 MMHG | TEMPERATURE: 97 F | HEART RATE: 111 BPM | HEIGHT: 60 IN

## 2022-11-18 DIAGNOSIS — C50.812 MALIGNANT NEOPLASM OF OVERLAPPING SITES OF LEFT BREAST IN FEMALE, ESTROGEN RECEPTOR POSITIVE (HCC): ICD-10-CM

## 2022-11-18 DIAGNOSIS — Z17.0 MALIGNANT NEOPLASM OF OVERLAPPING SITES OF LEFT BREAST IN FEMALE, ESTROGEN RECEPTOR POSITIVE (HCC): ICD-10-CM

## 2022-11-18 DIAGNOSIS — C77.9 REGIONAL LYMPH NODE METASTASIS PRESENT (HCC): ICD-10-CM

## 2022-11-18 DIAGNOSIS — T50.905D ADVERSE EFFECT OF DRUG, SUBSEQUENT ENCOUNTER: ICD-10-CM

## 2022-11-18 DIAGNOSIS — Z78.0 POSTMENOPAUSAL: ICD-10-CM

## 2022-11-18 DIAGNOSIS — Z79.811 AROMATASE INHIBITOR USE: Primary | ICD-10-CM

## 2022-11-18 DIAGNOSIS — C77.9 REGIONAL LYMPH NODE METASTASIS PRESENT (HCC): Primary | ICD-10-CM

## 2022-11-18 DIAGNOSIS — D64.9 ANEMIA, UNSPECIFIED TYPE: ICD-10-CM

## 2022-11-18 DIAGNOSIS — R19.7 DIARRHEA, UNSPECIFIED TYPE: ICD-10-CM

## 2022-11-18 LAB
ALBUMIN SERPL-MCNC: 3.9 G/DL (ref 3.4–5)
ALBUMIN/GLOB SERPL: 1.3 {RATIO} (ref 1–2)
ALP LIVER SERPL-CCNC: 58 U/L
ALT SERPL-CCNC: 38 U/L
ANION GAP SERPL CALC-SCNC: 4 MMOL/L (ref 0–18)
AST SERPL-CCNC: 24 U/L (ref 15–37)
BASOPHILS # BLD AUTO: 0.03 X10(3) UL (ref 0–0.2)
BASOPHILS NFR BLD AUTO: 0.5 %
BILIRUB SERPL-MCNC: 1 MG/DL (ref 0.1–2)
BUN BLD-MCNC: 18 MG/DL (ref 7–18)
CALCIUM BLD-MCNC: 9 MG/DL (ref 8.5–10.1)
CHLORIDE SERPL-SCNC: 111 MMOL/L (ref 98–112)
CO2 SERPL-SCNC: 24 MMOL/L (ref 21–32)
CREAT BLD-MCNC: 0.98 MG/DL
EOSINOPHIL # BLD AUTO: 0.29 X10(3) UL (ref 0–0.7)
EOSINOPHIL NFR BLD AUTO: 4.6 %
ERYTHROCYTE [DISTWIDTH] IN BLOOD BY AUTOMATED COUNT: 13.1 %
FOLATE SERPL-MCNC: 14.7 NG/ML (ref 8.7–?)
GFR SERPLBLD BASED ON 1.73 SQ M-ARVRAT: 69 ML/MIN/1.73M2 (ref 60–?)
GLOBULIN PLAS-MCNC: 3 G/DL (ref 2.8–4.4)
GLUCOSE BLD-MCNC: 120 MG/DL (ref 70–99)
HCT VFR BLD AUTO: 35.5 %
HGB BLD-MCNC: 12.5 G/DL
IMM GRANULOCYTES # BLD AUTO: 0.02 X10(3) UL (ref 0–1)
IMM GRANULOCYTES NFR BLD: 0.3 %
LYMPHOCYTES # BLD AUTO: 1.95 X10(3) UL (ref 1–4)
LYMPHOCYTES NFR BLD AUTO: 30.9 %
MCH RBC QN AUTO: 35 PG (ref 26–34)
MCHC RBC AUTO-ENTMCNC: 35.2 G/DL (ref 31–37)
MCV RBC AUTO: 99.4 FL
MONOCYTES # BLD AUTO: 0.6 X10(3) UL (ref 0.1–1)
MONOCYTES NFR BLD AUTO: 9.5 %
NEUTROPHILS # BLD AUTO: 3.42 X10 (3) UL (ref 1.5–7.7)
NEUTROPHILS # BLD AUTO: 3.42 X10(3) UL (ref 1.5–7.7)
NEUTROPHILS NFR BLD AUTO: 54.2 %
OSMOLALITY SERPL CALC.SUM OF ELEC: 291 MOSM/KG (ref 275–295)
PLATELET # BLD AUTO: 186 10(3)UL (ref 150–450)
POTASSIUM SERPL-SCNC: 4.3 MMOL/L (ref 3.5–5.1)
PROT SERPL-MCNC: 6.9 G/DL (ref 6.4–8.2)
RBC # BLD AUTO: 3.57 X10(6)UL
SODIUM SERPL-SCNC: 139 MMOL/L (ref 136–145)
VIT D+METAB SERPL-MCNC: 15.5 NG/ML (ref 30–100)
WBC # BLD AUTO: 6.3 X10(3) UL (ref 4–11)

## 2022-11-18 PROCEDURE — 99214 OFFICE O/P EST MOD 30 MIN: CPT | Performed by: INTERNAL MEDICINE

## 2022-11-18 PROCEDURE — 80053 COMPREHEN METABOLIC PANEL: CPT

## 2022-11-18 PROCEDURE — 96374 THER/PROPH/DIAG INJ IV PUSH: CPT

## 2022-11-18 RX ORDER — ERGOCALCIFEROL 1.25 MG/1
50000 CAPSULE ORAL WEEKLY
Qty: 12 CAPSULE | Refills: 0 | Status: SHIPPED | OUTPATIENT
Start: 2022-11-18 | End: 2023-02-04

## 2022-11-18 NOTE — PROGRESS NOTES
Education Record    Learner:  Patient    Disease / Diagnosis:breast ca      Barriers / Limitations:  None   Comments:    Method:  Discussion   Comments:    General Topics:  Plan of care reviewed   Comments:    Outcome:  Shows understanding   Comments:  Cont to get over sinus infection. On abx. Starting to feel better. Temp did get 102 for few days. Abemaciclib/ anastrozole.    Has been holding abemaciclib due to illness about 3 weeks,   Also with questions about breast MRI, has not heard back from Dr Suarez Du her surgeon

## 2022-11-29 ENCOUNTER — E-ADVICE (OUTPATIENT)
Dept: SURGERY | Age: 52
End: 2022-11-29

## 2022-12-01 ENCOUNTER — TELEPHONE (OUTPATIENT)
Dept: SURGERY | Age: 52
End: 2022-12-01

## 2022-12-01 DIAGNOSIS — Z85.3 HISTORY OF BREAST CANCER IN FEMALE: Primary | ICD-10-CM

## 2022-12-01 DIAGNOSIS — R92.30 INCONCLUSIVE MAMMOGRAPHY DUE TO DENSE BREASTS: ICD-10-CM

## 2022-12-01 DIAGNOSIS — C50.912 INFILTRATING DUCTAL CARCINOMA OF LEFT BREAST (CMD): ICD-10-CM

## 2022-12-01 DIAGNOSIS — C50.919 INFILTRATING DUCTAL CARCINOMA (CMD): ICD-10-CM

## 2022-12-01 DIAGNOSIS — R92.2 INCONCLUSIVE MAMMOGRAPHY DUE TO DENSE BREASTS: ICD-10-CM

## 2022-12-11 DIAGNOSIS — I10 PRIMARY HYPERTENSION: ICD-10-CM

## 2022-12-12 RX ORDER — LOSARTAN POTASSIUM 50 MG/1
TABLET ORAL
Qty: 90 TABLET | Refills: 0 | Status: SHIPPED | OUTPATIENT
Start: 2022-12-12

## 2022-12-13 ENCOUNTER — EXTERNAL RECORD (OUTPATIENT)
Dept: HEALTH INFORMATION MANAGEMENT | Facility: OTHER | Age: 52
End: 2022-12-13

## 2022-12-17 ENCOUNTER — LAB SERVICES (OUTPATIENT)
Dept: LAB | Age: 52
End: 2022-12-17

## 2022-12-17 DIAGNOSIS — I10 PRIMARY HYPERTENSION: ICD-10-CM

## 2022-12-17 LAB
CHOLEST SERPL-MCNC: 161 MG/DL
CHOLEST/HDLC SERPL: 2.1 {RATIO}
FASTING DURATION TIME PATIENT: 15 HOURS (ref 0–999)
HDLC SERPL-MCNC: 76 MG/DL
LDLC SERPL CALC-MCNC: 75 MG/DL
NONHDLC SERPL-MCNC: 85 MG/DL
TRIGL SERPL-MCNC: 52 MG/DL

## 2022-12-17 PROCEDURE — 80061 LIPID PANEL: CPT | Performed by: INTERNAL MEDICINE

## 2022-12-17 PROCEDURE — 36415 COLL VENOUS BLD VENIPUNCTURE: CPT | Performed by: INTERNAL MEDICINE

## 2022-12-20 ENCOUNTER — APPOINTMENT (OUTPATIENT)
Dept: SURGERY | Age: 52
End: 2022-12-20

## 2022-12-20 ENCOUNTER — OFFICE VISIT (OUTPATIENT)
Dept: SURGERY | Age: 52
End: 2022-12-20

## 2022-12-20 VITALS — HEIGHT: 60 IN | WEIGHT: 170 LBS | BODY MASS INDEX: 33.38 KG/M2

## 2022-12-20 DIAGNOSIS — R92.30 INCONCLUSIVE MAMMOGRAPHY DUE TO DENSE BREASTS: Primary | ICD-10-CM

## 2022-12-20 DIAGNOSIS — C50.912 INFILTRATING DUCTAL CARCINOMA OF LEFT BREAST (CMD): ICD-10-CM

## 2022-12-20 DIAGNOSIS — R92.2 INCONCLUSIVE MAMMOGRAPHY DUE TO DENSE BREASTS: Primary | ICD-10-CM

## 2022-12-20 DIAGNOSIS — Z79.811 USE OF ANASTROZOLE: ICD-10-CM

## 2022-12-20 DIAGNOSIS — I97.2 LYMPHEDEMA SYNDROME, POSTMASTECTOMY: ICD-10-CM

## 2022-12-20 DIAGNOSIS — C77.3 CARCINOMA OF LEFT BREAST METASTATIC TO AXILLARY LYMPH NODE (CMD): ICD-10-CM

## 2022-12-20 DIAGNOSIS — C50.919 INFILTRATING DUCTAL CARCINOMA (CMD): ICD-10-CM

## 2022-12-20 DIAGNOSIS — N64.89 POSTOPERATIVE BREAST ASYMMETRY: ICD-10-CM

## 2022-12-20 DIAGNOSIS — C50.912 CARCINOMA OF LEFT BREAST METASTATIC TO AXILLARY LYMPH NODE (CMD): ICD-10-CM

## 2022-12-20 DIAGNOSIS — Z85.3 HISTORY OF BREAST CANCER IN FEMALE: ICD-10-CM

## 2022-12-20 PROCEDURE — 99214 OFFICE O/P EST MOD 30 MIN: CPT | Performed by: SURGERY

## 2022-12-20 RX ORDER — ERGOCALCIFEROL 1.25 MG/1
CAPSULE ORAL
COMMUNITY
Start: 2022-11-18

## 2023-01-18 ENCOUNTER — TELEPHONE (OUTPATIENT)
Dept: HEMATOLOGY/ONCOLOGY | Facility: HOSPITAL | Age: 53
End: 2023-01-18

## 2023-01-18 NOTE — TELEPHONE ENCOUNTER
Called patient to set up with Dr. Marylou Sam and plastics, as she is transferring care to [facility]. Patient unable to talk and asked to call me back,  Provided direct phone number.

## 2023-01-19 ENCOUNTER — NURSE NAVIGATOR ENCOUNTER (OUTPATIENT)
Dept: HEMATOLOGY/ONCOLOGY | Facility: HOSPITAL | Age: 53
End: 2023-01-19

## 2023-01-19 NOTE — PROGRESS NOTES
[place] with Dr. Allison Beaver. She is up to date with visits, and anticipates next mammogram is due in 6 months. Has some prior breast images at home on CD, navigator requested images from [facility]. Will contact patient for appointment when all images are received.

## 2023-02-17 ENCOUNTER — OFFICE VISIT (OUTPATIENT)
Dept: HEMATOLOGY/ONCOLOGY | Facility: HOSPITAL | Age: 53
End: 2023-02-17
Attending: INTERNAL MEDICINE
Payer: COMMERCIAL

## 2023-02-17 VITALS
WEIGHT: 175.5 LBS | OXYGEN SATURATION: 97 % | HEIGHT: 60 IN | SYSTOLIC BLOOD PRESSURE: 144 MMHG | TEMPERATURE: 98 F | HEART RATE: 85 BPM | BODY MASS INDEX: 34.46 KG/M2 | RESPIRATION RATE: 18 BRPM | DIASTOLIC BLOOD PRESSURE: 99 MMHG

## 2023-02-17 DIAGNOSIS — C50.812 MALIGNANT NEOPLASM OF OVERLAPPING SITES OF LEFT BREAST IN FEMALE, ESTROGEN RECEPTOR POSITIVE (HCC): ICD-10-CM

## 2023-02-17 DIAGNOSIS — Z17.0 MALIGNANT NEOPLASM OF OVERLAPPING SITES OF LEFT BREAST IN FEMALE, ESTROGEN RECEPTOR POSITIVE (HCC): ICD-10-CM

## 2023-02-17 DIAGNOSIS — T50.905D ADVERSE EFFECT OF DRUG, SUBSEQUENT ENCOUNTER: ICD-10-CM

## 2023-02-17 DIAGNOSIS — Z79.811 AROMATASE INHIBITOR USE: Primary | ICD-10-CM

## 2023-02-17 DIAGNOSIS — C77.9 REGIONAL LYMPH NODE METASTASIS PRESENT (HCC): ICD-10-CM

## 2023-02-17 DIAGNOSIS — D64.9 ANEMIA, UNSPECIFIED TYPE: ICD-10-CM

## 2023-02-17 LAB
ALBUMIN SERPL-MCNC: 4.1 G/DL (ref 3.4–5)
ALBUMIN/GLOB SERPL: 1.3 {RATIO} (ref 1–2)
ALP LIVER SERPL-CCNC: 50 U/L
ALT SERPL-CCNC: 23 U/L
ANION GAP SERPL CALC-SCNC: 3 MMOL/L (ref 0–18)
AST SERPL-CCNC: 23 U/L (ref 15–37)
BILIRUB SERPL-MCNC: 1.1 MG/DL (ref 0.1–2)
BUN BLD-MCNC: 17 MG/DL (ref 7–18)
CALCIUM BLD-MCNC: 9.2 MG/DL (ref 8.5–10.1)
CHLORIDE SERPL-SCNC: 107 MMOL/L (ref 98–112)
CO2 SERPL-SCNC: 25 MMOL/L (ref 21–32)
CREAT BLD-MCNC: 1.2 MG/DL
GFR SERPLBLD BASED ON 1.73 SQ M-ARVRAT: 54 ML/MIN/1.73M2 (ref 60–?)
GLOBULIN PLAS-MCNC: 3.2 G/DL (ref 2.8–4.4)
GLUCOSE BLD-MCNC: 97 MG/DL (ref 70–99)
OSMOLALITY SERPL CALC.SUM OF ELEC: 281 MOSM/KG (ref 275–295)
POTASSIUM SERPL-SCNC: 4.4 MMOL/L (ref 3.5–5.1)
PROT SERPL-MCNC: 7.3 G/DL (ref 6.4–8.2)
SODIUM SERPL-SCNC: 135 MMOL/L (ref 136–145)

## 2023-02-17 PROCEDURE — 99214 OFFICE O/P EST MOD 30 MIN: CPT | Performed by: INTERNAL MEDICINE

## 2023-02-17 NOTE — PROGRESS NOTES
Education Record    Learner:  Patient    Disease / Diagnosis:breast cancer    Barriers / Limitations:  None   Comments:    Method:  Discussion   Comments:    General Topics:  Plan of care reviewed   Comments:    Outcome:  Shows understanding   Comments:  Abemaciclib/ anastrozole. Stomachache when she takes the abemaciclib lasts for 30 minutes. Eats crackers and it helps. Denies diarrhea. Energy is pretty low lots of work stress. Pt reports rash/ hives to neck and behind the ears, flares up and doesn't completely go away. Some itching.

## 2023-02-28 ENCOUNTER — HOSPITAL ENCOUNTER (OUTPATIENT)
Age: 53
Discharge: HOME | End: 2023-02-28
Payer: COMMERCIAL

## 2023-02-28 VITALS
WEIGHT: 174 LBS | HEIGHT: 60 IN | RESPIRATION RATE: 17 BRPM | SYSTOLIC BLOOD PRESSURE: 143 MMHG | OXYGEN SATURATION: 97 % | TEMPERATURE: 100 F | DIASTOLIC BLOOD PRESSURE: 99 MMHG | BODY MASS INDEX: 34.16 KG/M2 | HEART RATE: 86 BPM

## 2023-02-28 DIAGNOSIS — J01.90 ACUTE NON-RECURRENT SINUSITIS, UNSPECIFIED LOCATION: Primary | ICD-10-CM

## 2023-02-28 PROCEDURE — 99213 OFFICE O/P EST LOW 20 MIN: CPT | Performed by: PHYSICIAN ASSISTANT

## 2023-02-28 RX ORDER — AMOXICILLIN AND CLAVULANATE POTASSIUM 875; 125 MG/1; MG/1
1 TABLET, FILM COATED ORAL 2 TIMES DAILY
Qty: 20 TABLET | Refills: 0 | Status: SHIPPED | OUTPATIENT
Start: 2023-02-28 | End: 2023-03-10

## 2023-02-28 RX ORDER — METHYLPREDNISOLONE 4 MG/1
TABLET ORAL
Qty: 1 EACH | Refills: 0 | Status: SHIPPED | OUTPATIENT
Start: 2023-02-28

## 2023-02-28 NOTE — DISCHARGE INSTRUCTIONS
Take antibiotic as prescribed. You have also been prescribed a steroid Dosepak. You should take this as directed. Continue to use Flonase and oral decongestant. Follow-up with the ENT. Go to ER with any new or worsening symptoms.

## 2023-03-14 DIAGNOSIS — I10 PRIMARY HYPERTENSION: ICD-10-CM

## 2023-03-21 ENCOUNTER — OFFICE VISIT (OUTPATIENT)
Dept: INTERNAL MEDICINE CLINIC | Facility: CLINIC | Age: 53
End: 2023-03-21
Payer: COMMERCIAL

## 2023-03-21 VITALS
BODY MASS INDEX: 34.91 KG/M2 | RESPIRATION RATE: 18 BRPM | WEIGHT: 177.81 LBS | HEIGHT: 60 IN | DIASTOLIC BLOOD PRESSURE: 96 MMHG | SYSTOLIC BLOOD PRESSURE: 130 MMHG

## 2023-03-21 DIAGNOSIS — I10 ESSENTIAL HYPERTENSION: Primary | ICD-10-CM

## 2023-03-21 DIAGNOSIS — J45.20 MILD INTERMITTENT ASTHMA WITHOUT COMPLICATION: ICD-10-CM

## 2023-03-21 DIAGNOSIS — C50.812 MALIGNANT NEOPLASM OF OVERLAPPING SITES OF LEFT BREAST IN FEMALE, ESTROGEN RECEPTOR POSITIVE (HCC): ICD-10-CM

## 2023-03-21 DIAGNOSIS — Z17.0 MALIGNANT NEOPLASM OF OVERLAPPING SITES OF LEFT BREAST IN FEMALE, ESTROGEN RECEPTOR POSITIVE (HCC): ICD-10-CM

## 2023-03-21 DIAGNOSIS — I89.0 LYMPHEDEMA OF LEFT ARM: ICD-10-CM

## 2023-03-21 DIAGNOSIS — F41.9 ANXIETY: ICD-10-CM

## 2023-03-21 PROCEDURE — 3080F DIAST BP >= 90 MM HG: CPT | Performed by: INTERNAL MEDICINE

## 2023-03-21 PROCEDURE — 99203 OFFICE O/P NEW LOW 30 MIN: CPT | Performed by: INTERNAL MEDICINE

## 2023-03-21 PROCEDURE — 3075F SYST BP GE 130 - 139MM HG: CPT | Performed by: INTERNAL MEDICINE

## 2023-03-21 PROCEDURE — 3008F BODY MASS INDEX DOCD: CPT | Performed by: INTERNAL MEDICINE

## 2023-03-21 RX ORDER — ALBUTEROL SULFATE 90 UG/1
2 AEROSOL, METERED RESPIRATORY (INHALATION) EVERY 4 HOURS PRN
Qty: 1 EACH | Refills: 1 | Status: SHIPPED | OUTPATIENT
Start: 2023-03-21

## 2023-03-21 RX ORDER — LOSARTAN POTASSIUM AND HYDROCHLOROTHIAZIDE 12.5; 5 MG/1; MG/1
1 TABLET ORAL DAILY
Qty: 90 TABLET | Refills: 3 | Status: SHIPPED | OUTPATIENT
Start: 2023-03-21 | End: 2024-03-15

## 2023-03-25 RX ORDER — LOSARTAN POTASSIUM 50 MG/1
TABLET ORAL
Qty: 90 TABLET | Refills: 0 | OUTPATIENT
Start: 2023-03-25

## 2023-03-29 ENCOUNTER — TELEPHONE (OUTPATIENT)
Dept: SURGERY | Facility: CLINIC | Age: 53
End: 2023-03-29

## 2023-03-29 NOTE — TELEPHONE ENCOUNTER
Called pt to discuss moving surgery up to 4/4/23. 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  She was appreciative of the call

## 2023-04-02 ENCOUNTER — LAB ENCOUNTER (OUTPATIENT)
Dept: LAB | Facility: HOSPITAL | Age: 53
End: 2023-04-02
Attending: INTERNAL MEDICINE
Payer: COMMERCIAL

## 2023-04-02 DIAGNOSIS — I10 ESSENTIAL HYPERTENSION: ICD-10-CM

## 2023-04-02 LAB
ANION GAP SERPL CALC-SCNC: 6 MMOL/L (ref 0–18)
BUN BLD-MCNC: 22 MG/DL (ref 7–18)
CALCIUM BLD-MCNC: 9.1 MG/DL (ref 8.5–10.1)
CHLORIDE SERPL-SCNC: 105 MMOL/L (ref 98–112)
CO2 SERPL-SCNC: 24 MMOL/L (ref 21–32)
CREAT BLD-MCNC: 1.11 MG/DL
FASTING STATUS PATIENT QL REPORTED: YES
GFR SERPLBLD BASED ON 1.73 SQ M-ARVRAT: 59 ML/MIN/1.73M2 (ref 60–?)
GLUCOSE BLD-MCNC: 105 MG/DL (ref 70–99)
OSMOLALITY SERPL CALC.SUM OF ELEC: 284 MOSM/KG (ref 275–295)
POTASSIUM SERPL-SCNC: 4.2 MMOL/L (ref 3.5–5.1)
SODIUM SERPL-SCNC: 135 MMOL/L (ref 136–145)

## 2023-04-02 PROCEDURE — 36415 COLL VENOUS BLD VENIPUNCTURE: CPT

## 2023-04-02 PROCEDURE — 80048 BASIC METABOLIC PNL TOTAL CA: CPT

## 2023-05-02 ENCOUNTER — OFFICE VISIT (OUTPATIENT)
Dept: INTERNAL MEDICINE CLINIC | Facility: CLINIC | Age: 53
End: 2023-05-02
Payer: COMMERCIAL

## 2023-05-02 VITALS
DIASTOLIC BLOOD PRESSURE: 86 MMHG | BODY MASS INDEX: 34.68 KG/M2 | HEART RATE: 66 BPM | SYSTOLIC BLOOD PRESSURE: 136 MMHG | OXYGEN SATURATION: 98 % | HEIGHT: 60 IN | WEIGHT: 176.63 LBS

## 2023-05-02 DIAGNOSIS — Z12.83 SCREENING FOR SKIN CANCER: ICD-10-CM

## 2023-05-02 DIAGNOSIS — Z23 NEED FOR TDAP VACCINATION: ICD-10-CM

## 2023-05-02 DIAGNOSIS — I10 ESSENTIAL HYPERTENSION: Primary | ICD-10-CM

## 2023-05-02 PROBLEM — N18.31 CHRONIC KIDNEY DISEASE, STAGE 3A (HCC): Status: ACTIVE | Noted: 2023-05-02

## 2023-05-02 PROCEDURE — 3079F DIAST BP 80-89 MM HG: CPT | Performed by: INTERNAL MEDICINE

## 2023-05-02 PROCEDURE — 90715 TDAP VACCINE 7 YRS/> IM: CPT | Performed by: INTERNAL MEDICINE

## 2023-05-02 PROCEDURE — 99213 OFFICE O/P EST LOW 20 MIN: CPT | Performed by: INTERNAL MEDICINE

## 2023-05-02 PROCEDURE — 90471 IMMUNIZATION ADMIN: CPT | Performed by: INTERNAL MEDICINE

## 2023-05-02 PROCEDURE — 3008F BODY MASS INDEX DOCD: CPT | Performed by: INTERNAL MEDICINE

## 2023-05-02 PROCEDURE — 3075F SYST BP GE 130 - 139MM HG: CPT | Performed by: INTERNAL MEDICINE

## 2023-05-02 RX ORDER — LOSARTAN POTASSIUM AND HYDROCHLOROTHIAZIDE 12.5; 5 MG/1; MG/1
1 TABLET ORAL DAILY
Qty: 30 TABLET | Refills: 5 | Status: SHIPPED | OUTPATIENT
Start: 2023-05-02 | End: 2024-04-26

## 2023-05-05 ENCOUNTER — TELEPHONE (OUTPATIENT)
Dept: PHYSICAL THERAPY | Facility: HOSPITAL | Age: 53
End: 2023-05-05

## 2023-05-10 ENCOUNTER — OFFICE VISIT (OUTPATIENT)
Dept: OCCUPATIONAL MEDICINE | Facility: HOSPITAL | Age: 53
End: 2023-05-10
Attending: INTERNAL MEDICINE
Payer: COMMERCIAL

## 2023-05-10 DIAGNOSIS — I89.0 LYMPHEDEMA OF LEFT ARM: ICD-10-CM

## 2023-05-10 PROCEDURE — 97535 SELF CARE MNGMENT TRAINING: CPT

## 2023-05-10 PROCEDURE — 97167 OT EVAL HIGH COMPLEX 60 MIN: CPT

## 2023-05-17 ENCOUNTER — OFFICE VISIT (OUTPATIENT)
Dept: OCCUPATIONAL MEDICINE | Facility: HOSPITAL | Age: 53
End: 2023-05-17
Attending: INTERNAL MEDICINE
Payer: COMMERCIAL

## 2023-05-17 PROCEDURE — 97140 MANUAL THERAPY 1/> REGIONS: CPT

## 2023-05-18 NOTE — PROGRESS NOTES
Diagnosis:   Lymphedema of left arm (I89.0)     Referring Provider: Indu Velasquez  Date of Evaluation:    5/10/2023    Precautions:  Lymphedema; HTN; h/o CKD Next MD visit:   none scheduled  Date of Surgery: n/a   Insurance Primary/Secondary: [employer] P[place] / N/A # Authorized Visits: 2/18            Next MD/Plan Renewal Date: 8/8/2023        Subjective:   *Pt reports no questions from last session. *Is scheduled for plastic surgery on 6/13, following which there will be a recovery period. Assessment:   Pt appears motivated for short stretch compression bandaging. Appears to understand rationale behind avoiding use of OTS garments. Objective:     OBSERVATION:   *Left UE:  Tissue quality over medial surfaces of upper arm/elbow is boggy, non-pitting; lateral surfaces are slightly boggy; good superficial tissue mobility. Medial surface of forearm is boggy, non-pitting with poor superficial tissue mobility; lateral surface is boggy to slightly boggy with fair superficial tissue mobility. Dorsum of hand is boggy, pitting; fingers slightly boggy. *Left breast/trunk:  Minimal, slightly boggy lymphedema over inferior surface of breast. Subtle, lymphedema over posterior trunk superior to scapular spine and adjacent to axilla. MEASUREMENTS:   None taken   TREATMENT:  *Advised pt that she has scheduled only 7 visits; will need to add more appts. *Re-discussed need for use of custom-fit garment; advised to not wear OTS. *Education provided in benefits of sequential pneumatic compression device. *Left upper quadrant manual lymph drainage   *Discussed moving forward with short stretch compression bandaging of Left UE. Pt in agreement. *Applied initial bandage system. Discussed potential for adding finger wrap and another short stretch compression bandage in the future. Advised to wear for 20-23 hours as tolerated. Replace with custom-fit sleeve.    COMPRESSION:  *Left UE: stockinet; Cellona wrap; 2 short stretch compression bandages: 1, 4cm, 1, 8cm     Goals:  (to be met in 18 visits)  1. Pt will be independent in decongestive exercises. 2. Pt will be independent in self-manual lymph drainage. 3. Pt will obtain necessary supplies for reduction phase of therapy. 4. Pt will tolerate Left UE short stretch compression bandaging for 20-23 hours. 5. Pt/Caregiver will be independent in Left UE short stretch compression bandaging. 6. Reduce Left UE lymphedema volume by 20-25cm to improve pt's comfort and self-esteem and allow pt to be re-fit for custom-fit garments. 7. Reduce Left UE lymphedema density to slightly boggy to reduce infection risk. 8. Pt will be independent in use of compression garments, self-manual lymph drainage, decongestive exercises and lymphedema precautions for life-long self-management of lymphedema. Plan:   Continue current plan. Assess response to compression bandaging; modify as needed.       Charges: 4 Manual Therapy    Total Timed Treatment: 55 min  Total Treatment Time: 60 min         UE Circumferential Measurements (cm) 5/10/2023  LEFT 5/10/2023  RIGHT   Axilla 37.0 35.0   **12cm above elbow crease 37.2 35.0   10cm above elbow crease 37.0 35.0   5cm above elbow crease 37.5 33.8   Elbow crease 28.5 25.8          **17cm above ulnar styloid   30.9   25.8   15cm above ulnar styloid 30.7 25.6   10cm above ulnar styloid 28.0 23.2   5cm above ulnar styloid 23.0 18.7   Ulnar styloid 17.5 15.2     Mid-palm   18.7   18.1   Across MCPs 18.3 18.2   Thumb P1 6.3 6.1   Index P1 6.7 6.2   P2 5.3 5.1   Middle P1 6.4 5.9   P2 5.3 5.3   Ring P1 5.9 5.2   P2 4.8 4.7   Little P1 5.3 4.8   P2 4.2 4.0     TOTALS   394.5   356.7

## 2023-05-19 ENCOUNTER — OFFICE VISIT (OUTPATIENT)
Dept: HEMATOLOGY/ONCOLOGY | Facility: HOSPITAL | Age: 53
End: 2023-05-19
Attending: INTERNAL MEDICINE
Payer: COMMERCIAL

## 2023-05-19 VITALS
OXYGEN SATURATION: 99 % | HEIGHT: 60 IN | WEIGHT: 177 LBS | BODY MASS INDEX: 34.75 KG/M2 | RESPIRATION RATE: 18 BRPM | TEMPERATURE: 99 F | SYSTOLIC BLOOD PRESSURE: 137 MMHG | DIASTOLIC BLOOD PRESSURE: 81 MMHG | HEART RATE: 67 BPM

## 2023-05-19 DIAGNOSIS — Z79.811 AROMATASE INHIBITOR USE: Primary | ICD-10-CM

## 2023-05-19 DIAGNOSIS — D64.9 ANEMIA, UNSPECIFIED TYPE: ICD-10-CM

## 2023-05-19 DIAGNOSIS — R19.7 DIARRHEA, UNSPECIFIED TYPE: ICD-10-CM

## 2023-05-19 DIAGNOSIS — C77.9 REGIONAL LYMPH NODE METASTASIS PRESENT (HCC): Primary | ICD-10-CM

## 2023-05-19 DIAGNOSIS — Z17.0 MALIGNANT NEOPLASM OF OVERLAPPING SITES OF LEFT BREAST IN FEMALE, ESTROGEN RECEPTOR POSITIVE (HCC): ICD-10-CM

## 2023-05-19 DIAGNOSIS — T50.905D ADVERSE EFFECT OF DRUG, SUBSEQUENT ENCOUNTER: ICD-10-CM

## 2023-05-19 DIAGNOSIS — C50.812 MALIGNANT NEOPLASM OF OVERLAPPING SITES OF LEFT BREAST IN FEMALE, ESTROGEN RECEPTOR POSITIVE (HCC): ICD-10-CM

## 2023-05-19 DIAGNOSIS — T45.1X5D ADVERSE EFFECT OF CHEMOTHERAPY, SUBSEQUENT ENCOUNTER: ICD-10-CM

## 2023-05-19 DIAGNOSIS — C77.9 REGIONAL LYMPH NODE METASTASIS PRESENT (HCC): ICD-10-CM

## 2023-05-19 DIAGNOSIS — Z78.0 POSTMENOPAUSAL: ICD-10-CM

## 2023-05-19 LAB
ALBUMIN SERPL-MCNC: 3.5 G/DL (ref 3.4–5)
ALBUMIN/GLOB SERPL: 1.1 {RATIO} (ref 1–2)
ALP LIVER SERPL-CCNC: 66 U/L
ALT SERPL-CCNC: 23 U/L
ANION GAP SERPL CALC-SCNC: 6 MMOL/L (ref 0–18)
AST SERPL-CCNC: 27 U/L (ref 15–37)
BASOPHILS # BLD AUTO: 0.04 X10(3) UL (ref 0–0.2)
BASOPHILS NFR BLD AUTO: 0.6 %
BILIRUB SERPL-MCNC: 0.9 MG/DL (ref 0.1–2)
BUN BLD-MCNC: 19 MG/DL (ref 7–18)
CALCIUM BLD-MCNC: 8.9 MG/DL (ref 8.5–10.1)
CHLORIDE SERPL-SCNC: 103 MMOL/L (ref 98–112)
CO2 SERPL-SCNC: 23 MMOL/L (ref 21–32)
CREAT BLD-MCNC: 0.88 MG/DL
EOSINOPHIL # BLD AUTO: 0.29 X10(3) UL (ref 0–0.7)
EOSINOPHIL NFR BLD AUTO: 4.7 %
ERYTHROCYTE [DISTWIDTH] IN BLOOD BY AUTOMATED COUNT: 11.2 %
FASTING STATUS PATIENT QL REPORTED: NO
GFR SERPLBLD BASED ON 1.73 SQ M-ARVRAT: 78 ML/MIN/1.73M2 (ref 60–?)
GLOBULIN PLAS-MCNC: 3.3 G/DL (ref 2.8–4.4)
GLUCOSE BLD-MCNC: 106 MG/DL (ref 70–99)
HCT VFR BLD AUTO: 34.6 %
HGB BLD-MCNC: 12.1 G/DL
IMM GRANULOCYTES # BLD AUTO: 0.03 X10(3) UL (ref 0–1)
IMM GRANULOCYTES NFR BLD: 0.5 %
LYMPHOCYTES # BLD AUTO: 1.6 X10(3) UL (ref 1–4)
LYMPHOCYTES NFR BLD AUTO: 25.8 %
MCH RBC QN AUTO: 33.3 PG (ref 26–34)
MCHC RBC AUTO-ENTMCNC: 35 G/DL (ref 31–37)
MCV RBC AUTO: 95.3 FL
MONOCYTES # BLD AUTO: 0.65 X10(3) UL (ref 0.1–1)
MONOCYTES NFR BLD AUTO: 10.5 %
NEUTROPHILS # BLD AUTO: 3.58 X10 (3) UL (ref 1.5–7.7)
NEUTROPHILS # BLD AUTO: 3.58 X10(3) UL (ref 1.5–7.7)
NEUTROPHILS NFR BLD AUTO: 57.9 %
OSMOLALITY SERPL CALC.SUM OF ELEC: 277 MOSM/KG (ref 275–295)
PLATELET # BLD AUTO: 227 10(3)UL (ref 150–450)
POTASSIUM SERPL-SCNC: 4.3 MMOL/L (ref 3.5–5.1)
PROT SERPL-MCNC: 6.8 G/DL (ref 6.4–8.2)
RBC # BLD AUTO: 3.63 X10(6)UL
SODIUM SERPL-SCNC: 132 MMOL/L (ref 136–145)
VIT D+METAB SERPL-MCNC: 18.8 NG/ML (ref 30–100)
WBC # BLD AUTO: 6.2 X10(3) UL (ref 4–11)

## 2023-05-19 PROCEDURE — 96374 THER/PROPH/DIAG INJ IV PUSH: CPT

## 2023-05-19 PROCEDURE — 99214 OFFICE O/P EST MOD 30 MIN: CPT | Performed by: INTERNAL MEDICINE

## 2023-05-19 RX ORDER — ERGOCALCIFEROL 1.25 MG/1
50000 CAPSULE ORAL WEEKLY
Qty: 12 CAPSULE | Refills: 0 | Status: SHIPPED | OUTPATIENT
Start: 2023-05-19 | End: 2023-08-05

## 2023-05-19 NOTE — PROGRESS NOTES
Education Record    Learner:  Patient    Disease / Diagnosis:breast ca    Barriers / Limitations:  None   Comments:    Method:  Discussion   Comments:    General Topics:  Plan of care reviewed   Comments:    Outcome:  Shows understanding   Comments:  Taking anastozole. Some fatigue  Back to OT for lymphadema. Using sleeve and therapy, wrapping. Having surgery this summer, right reduction. Stopped abemaciclib, around mid April, feeling so much better  Uses aveeno with hydrocortisone prn for itching/ rash.

## 2023-05-19 NOTE — PROGRESS NOTES
Education Record    Learner:  Patient    Disease / Rakan Alex infusion    Barriers / Limitations:  None   Comments:    Method:  Discussion   Comments:    General Topics:  Medication and Plan of care reviewed   Comments:    Outcome:  Shows understanding   Comments:tolerated infusion without incident. 6 month appointment requested. Discharged to home in stable condition.

## 2023-05-22 ENCOUNTER — OFFICE VISIT (OUTPATIENT)
Dept: SURGERY | Facility: CLINIC | Age: 53
End: 2023-05-22
Payer: COMMERCIAL

## 2023-05-22 ENCOUNTER — OFFICE VISIT (OUTPATIENT)
Dept: OCCUPATIONAL MEDICINE | Facility: HOSPITAL | Age: 53
End: 2023-05-22
Attending: INTERNAL MEDICINE
Payer: COMMERCIAL

## 2023-05-22 VITALS
DIASTOLIC BLOOD PRESSURE: 82 MMHG | HEIGHT: 60 IN | OXYGEN SATURATION: 100 % | TEMPERATURE: 98 F | HEART RATE: 81 BPM | SYSTOLIC BLOOD PRESSURE: 129 MMHG | WEIGHT: 175.38 LBS | RESPIRATION RATE: 16 BRPM | BODY MASS INDEX: 34.43 KG/M2

## 2023-05-22 DIAGNOSIS — Z17.0 MALIGNANT NEOPLASM OF OVERLAPPING SITES OF LEFT BREAST IN FEMALE, ESTROGEN RECEPTOR POSITIVE (HCC): Primary | ICD-10-CM

## 2023-05-22 DIAGNOSIS — C50.812 MALIGNANT NEOPLASM OF OVERLAPPING SITES OF LEFT BREAST IN FEMALE, ESTROGEN RECEPTOR POSITIVE (HCC): Primary | ICD-10-CM

## 2023-05-22 DIAGNOSIS — N64.89 BREAST ASYMMETRY: ICD-10-CM

## 2023-05-22 PROCEDURE — 97140 MANUAL THERAPY 1/> REGIONS: CPT

## 2023-05-22 NOTE — PROGRESS NOTES
Diagnosis:   Lymphedema of left arm (I89.0)     Referring Provider: Fidelina Valdez  Date of Evaluation:    5/10/2023    Precautions:  Lymphedema; HTN; h/o CKD Next MD visit:   none scheduled  Date of Surgery: n/a   Insurance Primary/Secondary: [place] / N/A # Authorized Visits: 3/18            Next MD/Plan Renewal Date: 8/8/2023        Subjective:   *Pt reports she wore Left UE bandage system applied at last session through the following day without difficulty; did not experience any finger swelling. Limb was smaller and softer upon removal of system. Returned to wearing custom-fit garments the following day. Assessment:   Pt with good tolerance to Left UE short stretch compression bandaging; able to wear for therapeutic number of hours. Pt appearing well-motivated for daily short stretch compression bandaging. Objective:     OBSERVATION:   *Left UE:  Tissue quality over medial surfaces of upper arm/elbow is boggy, non-pitting; lateral surfaces are slightly boggy; good superficial tissue mobility. Medial surface of forearm is boggy, non-pitting with poor superficial tissue mobility; lateral surface is boggy to slightly boggy with fair superficial tissue mobility. Dorsum of hand is boggy, pitting; fingers slightly boggy. *Left breast/trunk:  Minimal, slightly boggy lymphedema over inferior surface of breast. Subtle, lymphedema over posterior trunk superior to scapular spine and adjacent to axilla. MEASUREMENTS:   None taken   TREATMENT:  *Step-by-step instruction/demonstration in short stretch compression bandaging of Left UE followed by pt return demonstration; 2-3 verbal cues needed. *Discussed importance of skin hydration via lotion. *Added 1/4\" foam insert to system over dorsum of hand and wrist; added 3rd bandage. *Discussed laundering of stockinet, short stretch compression bandaging. *Provided pt with necessary supplies for reduction phase of therapy along with online websites for ordering. COMPRESSION:  *Left UE: stockinet; 1/4\" foam insert over dorsum of hand/wrist crease; Cellona wrap; 3 short stretch compression bandages: 1, 4cm, 1, 8cm, 1, 10cm     Goals:  (to be met in 18 visits)  1. Pt will be independent in decongestive exercises. 2. Pt will be independent in self-manual lymph drainage. 3. Pt will obtain necessary supplies for reduction phase of therapy. 4. Pt will tolerate Left UE short stretch compression bandaging for 20-23 hours. ACHIEVED   5. Pt/Caregiver will be independent in Left UE short stretch compression bandaging. 6. Reduce Left UE lymphedema volume by 20-25cm to improve pt's comfort and self-esteem and allow pt to be re-fit for custom-fit garments. 7. Reduce Left UE lymphedema density to slightly boggy to reduce infection risk. 8. Pt will be independent in use of compression garments, self-manual lymph drainage, decongestive exercises and lymphedema precautions for life-long self-management of lymphedema. Plan:   Continue current plan. Re-instruction in self-bandaging, as needed.      Charges: 4 Manual Therapy    Total Timed Treatment: 55 min  Total Treatment Time: 60 min         UE Circumferential Measurements (cm) 5/10/2023  LEFT 5/10/2023  RIGHT   Axilla 37.0 35.0   **12cm above elbow crease 37.2 35.0   10cm above elbow crease 37.0 35.0   5cm above elbow crease 37.5 33.8   Elbow crease 28.5 25.8          **17cm above ulnar styloid   30.9   25.8   15cm above ulnar styloid 30.7 25.6   10cm above ulnar styloid 28.0 23.2   5cm above ulnar styloid 23.0 18.7   Ulnar styloid 17.5 15.2     Mid-palm   18.7   18.1   Across MCPs 18.3 18.2   Thumb P1 6.3 6.1   Index P1 6.7 6.2   P2 5.3 5.1   Middle P1 6.4 5.9   P2 5.3 5.3   Ring P1 5.9 5.2   P2 4.8 4.7   Little P1 5.3 4.8   P2 4.2 4.0     TOTALS   394.5   356.7

## 2023-05-24 ENCOUNTER — OFFICE VISIT (OUTPATIENT)
Dept: SURGERY | Facility: CLINIC | Age: 53
End: 2023-05-24
Payer: COMMERCIAL

## 2023-05-24 ENCOUNTER — TELEPHONE (OUTPATIENT)
Dept: INTERNAL MEDICINE CLINIC | Facility: CLINIC | Age: 53
End: 2023-05-24

## 2023-05-24 ENCOUNTER — OFFICE VISIT (OUTPATIENT)
Dept: OCCUPATIONAL MEDICINE | Facility: HOSPITAL | Age: 53
End: 2023-05-24
Attending: INTERNAL MEDICINE
Payer: COMMERCIAL

## 2023-05-24 VITALS — WEIGHT: 175.63 LBS | HEIGHT: 58.86 IN | BODY MASS INDEX: 35.4 KG/M2

## 2023-05-24 DIAGNOSIS — N64.89 BREAST ASYMMETRY: ICD-10-CM

## 2023-05-24 DIAGNOSIS — C50.812 MALIGNANT NEOPLASM OF OVERLAPPING SITES OF LEFT BREAST IN FEMALE, ESTROGEN RECEPTOR POSITIVE (HCC): Primary | ICD-10-CM

## 2023-05-24 DIAGNOSIS — N64.89 BREAST ASYMMETRY: Primary | ICD-10-CM

## 2023-05-24 DIAGNOSIS — Z17.0 MALIGNANT NEOPLASM OF OVERLAPPING SITES OF LEFT BREAST IN FEMALE, ESTROGEN RECEPTOR POSITIVE (HCC): Primary | ICD-10-CM

## 2023-05-24 PROCEDURE — 97140 MANUAL THERAPY 1/> REGIONS: CPT

## 2023-05-24 PROCEDURE — 99213 OFFICE O/P EST LOW 20 MIN: CPT | Performed by: SURGERY

## 2023-05-24 PROCEDURE — 3008F BODY MASS INDEX DOCD: CPT | Performed by: SURGERY

## 2023-05-24 NOTE — PATIENT INSTRUCTIONS
Surgeon:              Dr. Nestor Cooney. Jagdish Dodson, PhD                                          Tel:         [phone]                                  Fax:        [phone]    Surgery/Procedure: Right breast balancing reduction, 2.5 hours, general anesthesia, outpatient             06/13/2023                               Hospital:  [facility]: [address] [place], [place] [address]vd, [place], [address]           [phone]  [facility]: [address], [facility]               [phone]    1. Someone will need to drive you to and from the hospital if your procedure is outpatient. 2.Do not drink alcohol or smoke 24 hours prior to your procedure. 3. Bring a picture ID and your insurance card. 4. You will be contacted by the hospital the day before to confirm the procedure time and location. 5. The hospital will also contact you approximately one week before surgery to schedule your COVID test (only if surgery is inpatient/overnight stay). 6. Do not take any herbal supplements or blood thinners at least one week before your procedure/surgery. This includes NSAID's (aspirin, baby aspirin, Motrin, Ibuprofen, Aleve, Advil, Naproxen, etc), Plavix, fish oil, vitamin E, turmeric, CoQ10, or green tea supplements, etc. *TYLENOL or acetaminophen is ok to take*    7. PRE-OPERATIVE TESTING: History and physical with medical clearance is REQUIRED within 30 days of the surgery date and is mandatory per Dr. Jagdish Dodson. *If this is not done, your surgery will be postponed*  MEDICAL CLEARANCE WITH DR. Alcira Durbin  CBC  CMP  EKG  Oncology clearance with Dr. Nina Drummond     8. Please inform us if you develop any Covid-19 like symptoms, test positive or have been exposed for Covid- 19 prior to surgery.      Consent obtained   Photos taken on 05/24/2023

## 2023-05-24 NOTE — TELEPHONE ENCOUNTER
Pt is scheduled for surgery with  surgery date is 6/13/23 form [facility]ed to [address] [place] [facility]

## 2023-05-25 NOTE — PROGRESS NOTES
Diagnosis:   Lymphedema of left arm (I89.0)     Referring Provider: Adams Pedraza  Date of Evaluation:    5/10/2023    Precautions:  Lymphedema; HTN; h/o CKD Next MD visit:   none scheduled  Date of Surgery: n/a   Insurance Primary/Secondary: [employer] P[place] / N/A # Authorized Visits: 4/18            Next MD/Plan Renewal Date: 8/8/2023        Subjective:   *Pt reports she needed to remove Left UE bandage system at bedtime after last session d/t uncomfortable finger swelling. Replaced with nighttime garment; noticed that garment fit better. *Used custom-fit garments the following day. *Feels limb is \"smaller, softer. \"   *Has ordered more bandaging supplies. Assessment:   Pt demonstrating progress towards reduction of lymphedema density in Left UE. If pt continues to struggle with bandage-induced finger swelling, will need to add Mollelast wrap vs reduce number of bandages. Objective:  Arrived wearing custom-fit sleeve and glove. OBSERVATION:   *Reduction in lymphedema density over Left forearm with improved superficial tissue mobility. *Left UE:  Tissue quality over medial surfaces of upper arm/elbow is boggy, non-pitting; lateral surfaces are slightly boggy; good superficial tissue mobility. Medial surface of forearm is boggy, pitting with fair superficial tissue mobility; lateral surface is boggy to slightly boggy with fair superficial tissue mobility. Dorsum of hand is boggy, pitting; fingers slightly boggy. *Left breast/trunk:  Minimal, slightly boggy lymphedema over inferior surface of breast. Subtle, lymphedema over posterior trunk superior to scapular spine and adjacent to axilla. MEASUREMENTS:   None taken   TREATMENT:  *Left upper quadrant manual lymph drainage with soft tissue mobilization of dense areas of lymphedema. Observed reduction in forearm lymphedema density with improved superficial tissue mobility by end of session. *Discussed use of Mollelast finger wrap with bandage system.  Pt requesting to defer application, sharing that she has a lot of typing to do tomorrow. *Applied bandage system using less aggressive approach. Advised pt if finger swelling occurs, begin by removing final bandage only at first to see if it alleviates swelling/discomfort. Pt expressing understanding. COMPRESSION:  *Left UE: stockinet; 1/4\" foam insert over dorsum of hand/wrist crease; Cellona wrap; 3 short stretch compression bandages: 1, 4cm, 1, 8cm, 1, 10cm     Goals:  (to be met in 18 visits)  1. Pt will be independent in decongestive exercises. 2. Pt will be independent in self-manual lymph drainage. 3. Pt will obtain necessary supplies for reduction phase of therapy. 4. Pt will tolerate Left UE short stretch compression bandaging for 20-23 hours. ACHIEVED   5. Pt/Caregiver will be independent in Left UE short stretch compression bandaging. 6. Reduce Left UE lymphedema volume by 20-25cm to improve pt's comfort and self-esteem and allow pt to be re-fit for custom-fit garments. 7. Reduce Left UE lymphedema density to slightly boggy to reduce infection risk. 8. Pt will be independent in use of compression garments, self-manual lymph drainage, decongestive exercises and lymphedema precautions for life-long self-management of lymphedema. Plan:   Continue current plan. Re-instruction in self-bandaging, as needed.      Charges: 4 Manual Therapy    Total Timed Treatment: 55 min  Total Treatment Time: 60 min         UE Circumferential Measurements (cm) 5/10/2023  LEFT 5/10/2023  RIGHT   Axilla 37.0 35.0   **12cm above elbow crease 37.2 35.0   10cm above elbow crease 37.0 35.0   5cm above elbow crease 37.5 33.8   Elbow crease 28.5 25.8          **17cm above ulnar styloid   30.9   25.8   15cm above ulnar styloid 30.7 25.6   10cm above ulnar styloid 28.0 23.2   5cm above ulnar styloid 23.0 18.7   Ulnar styloid 17.5 15.2     Mid-palm   18.7   18.1   Across MCPs 18.3 18.2   Thumb P1 6.3 6.1   Index P1 6.7 6.2 P2 5.3 5.1   Middle P1 6.4 5.9   P2 5.3 5.3   Ring P1 5.9 5.2   P2 4.8 4.7   Little P1 5.3 4.8   P2 4.2 4.0     TOTALS   394.5   356.7

## 2023-05-25 NOTE — TELEPHONE ENCOUNTER
Our form faxed to Dr. Hewitt July office, conf received and placed in brown folder in phone room.     Future Appointments   Date Time Provider Barney Perdomo   5/31/2023  4:30 PM Delcie Clare, OT [address] [facility]   6/5/2023 10:00 AM Leah Caballero, DO [facility] [address] [facility]   6/5/2023  4:30 PM Delcie Clare, OT [address] [facility]   6/7/2023  4:30 PM Delcie Clare, OT [address] [facility]   7/12/2023 12:30 PM Delcie Clare, OT [address] [facility]   7/18/2023  2:30 PM Delcie Clare, OT [address] [facility]   7/20/2023  2:30 PM Delcie Clare, OT [address] [facility]   7/25/2023  2:30 PM Delcie Clare, OT [address] [facility]   7/27/2023  2:30 PM Delcie Clare, OT [address] [facility]   8/1/2023  2:30 PM Delcie Clare, OT [address] [facility]   8/3/2023  2:30 PM Delcie Clare, OT [address] [facility]   8/8/2023  2:30 PM Delcie Clare, OT [address] [facility]   11/17/2023 10:15 AM Ami Sagastume MD [address] [facility]   11/17/2023 10:30 AM [address] [place] RN1 [facility]   5/21/2024  8:45 AM Kellie Jiang MD [address] [facility]

## 2023-05-30 RX ORDER — ARGININE/GLUTAMINE/CALCIUM BMB 7G-7G-1.5G
1 POWDER IN PACKET (EA) ORAL DAILY
COMMUNITY

## 2023-05-31 ENCOUNTER — TELEPHONE (OUTPATIENT)
Dept: SURGERY | Facility: CLINIC | Age: 53
End: 2023-05-31

## 2023-05-31 ENCOUNTER — LAB ENCOUNTER (OUTPATIENT)
Dept: LAB | Age: 53
End: 2023-05-31
Attending: PHYSICIAN ASSISTANT
Payer: COMMERCIAL

## 2023-05-31 ENCOUNTER — OFFICE VISIT (OUTPATIENT)
Dept: OCCUPATIONAL MEDICINE | Facility: HOSPITAL | Age: 53
End: 2023-05-31
Attending: INTERNAL MEDICINE
Payer: COMMERCIAL

## 2023-05-31 DIAGNOSIS — N64.89 BREAST ASYMMETRY: ICD-10-CM

## 2023-05-31 LAB
PREALB SERPL-MCNC: 24 MG/DL (ref 20–40)
TRANSFERRIN SERPL-MCNC: 244 MG/DL (ref 200–360)

## 2023-05-31 PROCEDURE — 84466 ASSAY OF TRANSFERRIN: CPT | Performed by: PHYSICIAN ASSISTANT

## 2023-05-31 PROCEDURE — 84134 ASSAY OF PREALBUMIN: CPT | Performed by: PHYSICIAN ASSISTANT

## 2023-05-31 PROCEDURE — 97140 MANUAL THERAPY 1/> REGIONS: CPT

## 2023-05-31 NOTE — TELEPHONE ENCOUNTER
Patient was called to be reminded of her nutritional labs. Patient stated she will have them drawn today. Patient was appreciative of the call.

## 2023-05-31 NOTE — PROGRESS NOTES
Diagnosis:   Lymphedema of left arm (I89.0)     Referring Provider: Mayco Monte  Date of Evaluation:    5/10/2023    Precautions:  Lymphedema; HTN; h/o CKD Next MD visit:   none scheduled  Date of Surgery: n/a   Insurance Primary/Secondary: [employer] / N/A # Authorized Visits: 5/18            Next MD/Plan Renewal Date: 8/8/2023        Subjective:   *Pt reports she no difficulty with tolerating Left UE bandage system applied at last session. Has re-applied bandages 2 1/2 days since last seen. Otherwise is wearing custom-fit garments; did remove glove prior to leaving work today. Using nighttime garment on the nights she does not use bandages. *Has not yet returned to using Flexitouch Plus device; was unsure of when to do it. Assessment:   Pt demonstrating progress towards reduction of Left UE lymphedema volume. Would benefit from increasing short stretch compression bandaging to daily as well as adding Flexitouch Plus device to self-management schedule. Objective:  Arrived wearing custom-fit sleeve. OBSERVATION:   *Reduction in lymphedema density over Left upper arm with improved superficial tissue mobility. *Left UE:  Tissue quality over medial surfaces of upper arm/elbow is boggy to slightly boggy; lateral surfaces are slightly boggy; good superficial tissue mobility. Medial surface of forearm is boggy, pitting with fair superficial tissue mobility; lateral surface is boggy to slightly boggy with fair superficial tissue mobility. Dorsum of hand is boggy, pitting; fingers slightly boggy. *Left breast/trunk:  Minimal, slightly boggy lymphedema over inferior surface of breast. Subtle, lymphedema over posterior trunk superior to scapular spine and adjacent to axilla. MEASUREMENTS:   *Left UE circumferential lymphedema decreased by 8.1cm since initial eval. Left UE is 29.7cm larger than dominant Right.    TREATMENT:  *Volume re-measurement   *Left upper quadrant manual lymph drainage with soft tissue mobilization of dense areas of lymphedema. Observed reduction in forearm lymphedema density with improved superficial tissue mobility by end of session. *Discussed use of Flexitouch Plus device. Discussed use of device followed by bandaging vs use of device followed by donning nighttime garment, depending on schedule for the day. *Applied bandage system. *Encouraged pt to increase number of days of short stretch compression bandaging. COMPRESSION:  *Left UE: stockinet; 1/4\" foam insert over dorsum of hand/wrist crease; Cellona wrap; 3 short stretch compression bandages: 1, 4cm, 1, 8cm, 1, 10cm     Goals:  (to be met in 18 visits)  1. Pt will be independent in decongestive exercises. 2. Pt will be independent in self-manual lymph drainage. 3. Pt will obtain necessary supplies for reduction phase of therapy. 4. Pt will tolerate Left UE short stretch compression bandaging for 20-23 hours. ACHIEVED   5. Pt/Caregiver will be independent in Left UE short stretch compression bandaging. ACHIEVED     6. Reduce Left UE lymphedema volume by 20-25cm to improve pt's comfort and self-esteem and allow pt to be re-fit for custom-fit garments. 7. Reduce Left UE lymphedema density to slightly boggy to reduce infection risk. 8. Pt will be independent in use of compression garments, self-manual lymph drainage, decongestive exercises and lymphedema precautions for life-long self-management of lymphedema. Plan:   Continue current plan.      Charges: 4 Manual Therapy    Total Timed Treatment: 55 min  Total Treatment Time: 60 min         UE Circumferential Measurements (cm) 5/10/2023  LEFT 5/10/2023  RIGHT 5/31/2023  LEFT     Axilla 37.0 35.0 37.3   **12cm above elbow crease 37.2 35.0 36.6   10cm above elbow crease 37.0 35.0 36.7   5cm above elbow crease 37.5 33.8 36.6   Elbow crease 28.5 25.8 27.0          **17cm above ulnar styloid   30.9   25.8   29.7   15cm above ulnar styloid 30.7 25.6 29.7   10cm above ulnar styloid 28.0 23.2 27.1   5cm above ulnar styloid 23.0 18.7 21.8   Ulnar styloid 17.5 15.2 17.5     Mid-palm   18.7   18.1   18.7   Across MCPs 18.3 18.2 18.2   Thumb P1 6.3 6.1 6.1   Index P1 6.7 6.2 6.5   P2 5.3 5.1 5.1   Middle P1 6.4 5.9 6.4   P2 5.3 5.3 5.3   Ring P1 5.9 5.2 5.9   P2 4.8 4.7 4.8   Little P1 5.3 4.8 5.3   P2 4.2 4.0 4.1     TOTALS   394.5   356.7   386.4

## 2023-06-05 ENCOUNTER — OFFICE VISIT (OUTPATIENT)
Dept: INTERNAL MEDICINE CLINIC | Facility: CLINIC | Age: 53
End: 2023-06-05
Payer: COMMERCIAL

## 2023-06-05 ENCOUNTER — OFFICE VISIT (OUTPATIENT)
Dept: OCCUPATIONAL MEDICINE | Facility: HOSPITAL | Age: 53
End: 2023-06-05
Attending: INTERNAL MEDICINE
Payer: COMMERCIAL

## 2023-06-05 VITALS
RESPIRATION RATE: 16 BRPM | BODY MASS INDEX: 35.44 KG/M2 | HEART RATE: 86 BPM | OXYGEN SATURATION: 98 % | HEIGHT: 59 IN | SYSTOLIC BLOOD PRESSURE: 126 MMHG | WEIGHT: 175.81 LBS | DIASTOLIC BLOOD PRESSURE: 80 MMHG | TEMPERATURE: 98 F

## 2023-06-05 DIAGNOSIS — I89.0 LYMPHEDEMA OF LEFT ARM: ICD-10-CM

## 2023-06-05 DIAGNOSIS — I10 PRIMARY HYPERTENSION: ICD-10-CM

## 2023-06-05 DIAGNOSIS — Z01.818 PRE-OP EXAM: Primary | ICD-10-CM

## 2023-06-05 DIAGNOSIS — E87.1 HYPONATREMIA: ICD-10-CM

## 2023-06-05 LAB
ATRIAL RATE: 75 BPM
P AXIS: 37 DEGREES
P-R INTERVAL: 154 MS
Q-T INTERVAL: 426 MS
QRS DURATION: 78 MS
QTC CALCULATION (BEZET): 475 MS
R AXIS: 14 DEGREES
T AXIS: 18 DEGREES
VENTRICULAR RATE: 75 BPM

## 2023-06-05 PROCEDURE — 97140 MANUAL THERAPY 1/> REGIONS: CPT

## 2023-06-05 NOTE — PROGRESS NOTES
Diagnosis:   Lymphedema of left arm (I89.0)     Referring Provider: Miracle Bingham  Date of Evaluation:    5/10/2023    Precautions:  Lymphedema; HTN; h/o CKD Next MD visit:   none scheduled  Date of Surgery: n/a   Insurance Primary/Secondary: [employer] / N/A # Authorized Visits: 6/18            Next MD/Plan Renewal Date: 8/8/2023        Subjective:   *Pt reports use of Left UE bandage system on a daily basis, except for when she traveled to [place] for an overnight. Forgot to bring bandages with her, but did use garments. *Compression bandage order arrived before her trip, but she does not know where she put the box. Assessment:   Pt demonstrating improvement in use of short stretch compression bandaging for Left UE lymphedema volume. Good response to session techniques. Objective:  Arrived wearing custom-fit sleeve and glove. OBSERVATION:     *Left UE:  Tissue quality over medial surfaces of upper arm/elbow is boggy to slightly boggy; lateral surfaces are slightly boggy; good superficial tissue mobility. Medial surface of forearm is boggy, pitting with fair superficial tissue mobility; lateral surface is boggy to slightly boggy with fair superficial tissue mobility. Dorsum of hand is boggy, pitting; fingers slightly boggy. *Left breast/trunk:  Minimal, slightly boggy lymphedema over inferior surface of breast. Subtle, lymphedema over posterior trunk superior to scapular spine and adjacent to axilla. MEASUREMENTS:   None taken   TREATMENT:  *Left upper quadrant manual lymph drainage with soft tissue mobilization of dense areas of lymphedema. Observed reduction in forearm lymphedema density with improved superficial tissue mobility by end of session. *Applied bandage system. COMPRESSION:  *Left UE: stockinet; 1/4\" foam insert over dorsum of hand/wrist crease; Cellona wrap; 3 short stretch compression bandages: 1, 4cm, 1, 8cm, 1, 10cm     Goals:  (to be met in 18 visits)  1.  Pt will be independent in decongestive exercises. 2. Pt will be independent in self-manual lymph drainage. 3. Pt will obtain necessary supplies for reduction phase of therapy. 4. Pt will tolerate Left UE short stretch compression bandaging for 20-23 hours. ACHIEVED   5. Pt/Caregiver will be independent in Left UE short stretch compression bandaging. ACHIEVED     6. Reduce Left UE lymphedema volume by 20-25cm to improve pt's comfort and self-esteem and allow pt to be re-fit for custom-fit garments. 7. Reduce Left UE lymphedema density to slightly boggy to reduce infection risk. 8. Pt will be independent in use of compression garments, self-manual lymph drainage, decongestive exercises and lymphedema precautions for life-long self-management of lymphedema. Plan:   Continue current plan.      Charges: 4 Manual Therapy    Total Timed Treatment: 55 min  Total Treatment Time: 60 min         UE Circumferential Measurements (cm) 5/10/2023  LEFT 5/10/2023  RIGHT 5/31/2023  LEFT     Axilla 37.0 35.0 37.3   **12cm above elbow crease 37.2 35.0 36.6   10cm above elbow crease 37.0 35.0 36.7   5cm above elbow crease 37.5 33.8 36.6   Elbow crease 28.5 25.8 27.0          **17cm above ulnar styloid   30.9   25.8   29.7   15cm above ulnar styloid 30.7 25.6 29.7   10cm above ulnar styloid 28.0 23.2 27.1   5cm above ulnar styloid 23.0 18.7 21.8   Ulnar styloid 17.5 15.2 17.5     Mid-palm   18.7   18.1   18.7   Across MCPs 18.3 18.2 18.2   Thumb P1 6.3 6.1 6.1   Index P1 6.7 6.2 6.5   P2 5.3 5.1 5.1   Middle P1 6.4 5.9 6.4   P2 5.3 5.3 5.3   Ring P1 5.9 5.2 5.9   P2 4.8 4.7 4.8   Little P1 5.3 4.8 5.3   P2 4.2 4.0 4.1     TOTALS   394.5   356.7   386.4

## 2023-06-07 ENCOUNTER — OFFICE VISIT (OUTPATIENT)
Dept: OCCUPATIONAL MEDICINE | Facility: HOSPITAL | Age: 53
End: 2023-06-07
Attending: INTERNAL MEDICINE
Payer: COMMERCIAL

## 2023-06-07 PROCEDURE — 97140 MANUAL THERAPY 1/> REGIONS: CPT

## 2023-06-07 NOTE — PROGRESS NOTES
Diagnosis:   Lymphedema of left arm (I89.0)     Referring Provider: Chris Nelson  Date of Evaluation:    5/10/2023    Precautions:  Lymphedema; HTN; h/o CKD Next MD visit:   none scheduled  Date of Surgery: n/a   Insurance Primary/Secondary: [employer] [place] P[place] / N/A # Authorized Visits: 7/18            Next MD/Plan Renewal Date: 8/8/2023        Subjective:   *Pt reports she wore Left UE bandage system applied at last session until yesterday evening. Removed for dinner prep/dinner; replaced at bedtime. Left on self-applied bandages until about 10am this morning. Removed for showering, but wore custom-fit sleeve and glove instead of re-applying as she was doing some cleaning activities and didn't want to soil bandages. *Reports when she removed her bandages this morning, her hand and forearm volume was lower. Disappointed to see that they are more swollen this afternoon, even with wearing garments. *Is completing decongestive exercises. Assessment:   Pt with good compliance with use of Left UE bandaging, with progress made towards improvement of tissue quality. With upcoming surgery, do anticipate some exacerbation of her volume as she most likely will be unable to use her Right UE for bandaging purposes. Pt's Right breast reconstructive sx scheduled for 6/13, with an anticipated return to therapy date of 7/12. Objective:  Arrived wearing custom-fit sleeve and glove. Brought new compression supplies to session. OBSERVATION:   *Less volume over dorsum of hand with improved superficial tissue mobility over lateral forearm. *Left UE:  Tissue quality over medial surfaces of upper arm/elbow is boggy to slightly boggy; lateral surfaces are slightly boggy; good superficial tissue mobility. Medial surface of forearm is boggy, pitting with fair superficial tissue mobility; lateral surface is boggy to slightly boggy with good superficial tissue mobility.  Dorsum of hand is boggy to slightly boggy, pitting; fingers slightly boggy. *Left breast/trunk:  Minimal, slightly boggy lymphedema over inferior surface of breast. Subtle, lymphedema over posterior trunk superior to scapular spine and adjacent to axilla. MEASUREMENTS:   None taken   TREATMENT:  *Left upper quadrant manual lymph drainage with soft tissue mobilization of dense areas of lymphedema. Observed reduction in forearm lymphedema density with improved superficial tissue mobility by end of session along with reduction in volume over dorsum of hand. *Applied bandage system. *Discussed use of Flexitouch Plus device. Recommended beginning use of device on a daily basis until upcoming surgery. *Discussed care of Left UE post-surgery. Advised pt that she will need to resume use of custom-fit garments as most likely self-bandaging will be challenging, and perhaps contraindicated, while she has post-surgical drains in place. Pt stating understanding; sharing that after initial recovery she will try bandaging at least her forearm and hand. COMPRESSION:  *Left UE: stockinet; 1/4\" foam insert over dorsum of hand/wrist crease; Cellona wrap; 3 short stretch compression bandages: 1, 4cm, 1, 8cm, 1, 10cm     Goals:  (to be met in 18 visits)  1. Pt will be independent in decongestive exercises. ACHIEVED   2. Pt will be independent in self-manual lymph drainage. 3. Pt will obtain necessary supplies for reduction phase of therapy. ACHIEVED   4. Pt will tolerate Left UE short stretch compression bandaging for 20-23 hours. ACHIEVED   5. Pt/Caregiver will be independent in Left UE short stretch compression bandaging. ACHIEVED     6. Reduce Left UE lymphedema volume by 20-25cm to improve pt's comfort and self-esteem and allow pt to be re-fit for custom-fit garments. 7. Reduce Left UE lymphedema density to slightly boggy to reduce infection risk.   8. Pt will be independent in use of compression garments, self-manual lymph drainage, decongestive exercises and lymphedema precautions for life-long self-management of lymphedema. Plan:   Pt has previously scheduled reconstructive surgery for Right breast on 6/13, following which she will have a period of recovery. She is scheduled to return to therapy on 7/12.     Charges: 4 Manual Therapy    Total Timed Treatment: 55 min  Total Treatment Time: 60 min         UE Circumferential Measurements (cm) 5/10/2023  LEFT 5/10/2023  RIGHT 5/31/2023  LEFT     Axilla 37.0 35.0 37.3   **12cm above elbow crease 37.2 35.0 36.6   10cm above elbow crease 37.0 35.0 36.7   5cm above elbow crease 37.5 33.8 36.6   Elbow crease 28.5 25.8 27.0          **17cm above ulnar styloid   30.9   25.8   29.7   15cm above ulnar styloid 30.7 25.6 29.7   10cm above ulnar styloid 28.0 23.2 27.1   5cm above ulnar styloid 23.0 18.7 21.8   Ulnar styloid 17.5 15.2 17.5     Mid-palm   18.7   18.1   18.7   Across MCPs 18.3 18.2 18.2   Thumb P1 6.3 6.1 6.1   Index P1 6.7 6.2 6.5   P2 5.3 5.1 5.1   Middle P1 6.4 5.9 6.4   P2 5.3 5.3 5.3   Ring P1 5.9 5.2 5.9   P2 4.8 4.7 4.8   Little P1 5.3 4.8 5.3   P2 4.2 4.0 4.1     TOTALS   394.5   356.7   386.4

## 2023-06-09 ENCOUNTER — TELEPHONE (OUTPATIENT)
Dept: SURGERY | Facility: CLINIC | Age: 53
End: 2023-06-09

## 2023-06-09 NOTE — TELEPHONE ENCOUNTER
Pt called and LVM, regarding CD of MRI, called pt back she will come  by [facility] office to drop off for Tuesday appt with . Pt was very appreciative of the call back.

## 2023-06-11 ENCOUNTER — ANESTHESIA EVENT (OUTPATIENT)
Dept: SURGERY | Facility: HOSPITAL | Age: 53
End: 2023-06-11
Payer: COMMERCIAL

## 2023-06-13 ENCOUNTER — ANESTHESIA (OUTPATIENT)
Dept: SURGERY | Facility: HOSPITAL | Age: 53
End: 2023-06-13
Payer: COMMERCIAL

## 2023-06-13 ENCOUNTER — HOSPITAL ENCOUNTER (OUTPATIENT)
Facility: HOSPITAL | Age: 53
Setting detail: HOSPITAL OUTPATIENT SURGERY
Discharge: HOME | End: 2023-06-13
Attending: SURGERY | Admitting: SURGERY
Payer: COMMERCIAL

## 2023-06-13 VITALS
BODY MASS INDEX: 34.36 KG/M2 | HEART RATE: 65 BPM | WEIGHT: 175 LBS | OXYGEN SATURATION: 98 % | RESPIRATION RATE: 18 BRPM | HEIGHT: 60 IN | SYSTOLIC BLOOD PRESSURE: 148 MMHG | TEMPERATURE: 98 F | DIASTOLIC BLOOD PRESSURE: 89 MMHG

## 2023-06-13 DIAGNOSIS — C50.812 MALIGNANT NEOPLASM OF OVERLAPPING SITES OF LEFT BREAST IN FEMALE, ESTROGEN RECEPTOR POSITIVE (HCC): Primary | ICD-10-CM

## 2023-06-13 DIAGNOSIS — Z17.0 MALIGNANT NEOPLASM OF OVERLAPPING SITES OF LEFT BREAST IN FEMALE, ESTROGEN RECEPTOR POSITIVE (HCC): Primary | ICD-10-CM

## 2023-06-13 PROCEDURE — 0HBT0ZZ EXCISION OF RIGHT BREAST, OPEN APPROACH: ICD-10-PCS | Performed by: SURGERY

## 2023-06-13 PROCEDURE — 88305 TISSUE EXAM BY PATHOLOGIST: CPT | Performed by: SURGERY

## 2023-06-13 RX ORDER — HYDROMORPHONE HYDROCHLORIDE 1 MG/ML
0.4 INJECTION, SOLUTION INTRAMUSCULAR; INTRAVENOUS; SUBCUTANEOUS EVERY 5 MIN PRN
Status: DISCONTINUED | OUTPATIENT
Start: 2023-06-13 | End: 2023-06-13

## 2023-06-13 RX ORDER — HYDROCODONE BITARTRATE AND ACETAMINOPHEN 5; 325 MG/1; MG/1
2 TABLET ORAL ONCE AS NEEDED
Status: COMPLETED | OUTPATIENT
Start: 2023-06-13 | End: 2023-06-13

## 2023-06-13 RX ORDER — PROCHLORPERAZINE EDISYLATE 5 MG/ML
5 INJECTION INTRAMUSCULAR; INTRAVENOUS EVERY 8 HOURS PRN
Status: DISCONTINUED | OUTPATIENT
Start: 2023-06-13 | End: 2023-06-13

## 2023-06-13 RX ORDER — LABETALOL HYDROCHLORIDE 5 MG/ML
5 INJECTION, SOLUTION INTRAVENOUS EVERY 5 MIN PRN
Status: DISCONTINUED | OUTPATIENT
Start: 2023-06-13 | End: 2023-06-13

## 2023-06-13 RX ORDER — HYDROMORPHONE HYDROCHLORIDE 1 MG/ML
0.6 INJECTION, SOLUTION INTRAMUSCULAR; INTRAVENOUS; SUBCUTANEOUS EVERY 5 MIN PRN
Status: DISCONTINUED | OUTPATIENT
Start: 2023-06-13 | End: 2023-06-13

## 2023-06-13 RX ORDER — HYDROMORPHONE HYDROCHLORIDE 1 MG/ML
0.2 INJECTION, SOLUTION INTRAMUSCULAR; INTRAVENOUS; SUBCUTANEOUS EVERY 5 MIN PRN
Status: DISCONTINUED | OUTPATIENT
Start: 2023-06-13 | End: 2023-06-13

## 2023-06-13 RX ORDER — HYDROCODONE BITARTRATE AND ACETAMINOPHEN 5; 325 MG/1; MG/1
1 TABLET ORAL ONCE AS NEEDED
Status: COMPLETED | OUTPATIENT
Start: 2023-06-13 | End: 2023-06-13

## 2023-06-13 RX ORDER — MEPERIDINE HYDROCHLORIDE 25 MG/ML
12.5 INJECTION INTRAMUSCULAR; INTRAVENOUS; SUBCUTANEOUS AS NEEDED
Status: DISCONTINUED | OUTPATIENT
Start: 2023-06-13 | End: 2023-06-13

## 2023-06-13 RX ORDER — ACETAMINOPHEN 500 MG
1000 TABLET ORAL ONCE AS NEEDED
Status: COMPLETED | OUTPATIENT
Start: 2023-06-13 | End: 2023-06-13

## 2023-06-13 RX ORDER — CEFAZOLIN SODIUM/WATER 2 G/20 ML
2 SYRINGE (ML) INTRAVENOUS ONCE
Status: COMPLETED | OUTPATIENT
Start: 2023-06-13 | End: 2023-06-13

## 2023-06-13 RX ORDER — GLYCOPYRROLATE 0.2 MG/ML
INJECTION, SOLUTION INTRAMUSCULAR; INTRAVENOUS AS NEEDED
Status: DISCONTINUED | OUTPATIENT
Start: 2023-06-13 | End: 2023-06-13 | Stop reason: SURG

## 2023-06-13 RX ORDER — HEPARIN SODIUM 5000 [USP'U]/ML
5000 INJECTION, SOLUTION INTRAVENOUS; SUBCUTANEOUS ONCE
Status: COMPLETED | OUTPATIENT
Start: 2023-06-13 | End: 2023-06-13

## 2023-06-13 RX ORDER — ONDANSETRON 2 MG/ML
4 INJECTION INTRAMUSCULAR; INTRAVENOUS EVERY 6 HOURS PRN
Status: DISCONTINUED | OUTPATIENT
Start: 2023-06-13 | End: 2023-06-13

## 2023-06-13 RX ORDER — SCOLOPAMINE TRANSDERMAL SYSTEM 1 MG/1
1 PATCH, EXTENDED RELEASE TRANSDERMAL ONCE
Status: DISCONTINUED | OUTPATIENT
Start: 2023-06-13 | End: 2023-06-13

## 2023-06-13 RX ORDER — NALOXONE HYDROCHLORIDE 0.4 MG/ML
80 INJECTION, SOLUTION INTRAMUSCULAR; INTRAVENOUS; SUBCUTANEOUS AS NEEDED
Status: DISCONTINUED | OUTPATIENT
Start: 2023-06-13 | End: 2023-06-13

## 2023-06-13 RX ORDER — ONDANSETRON 2 MG/ML
INJECTION INTRAMUSCULAR; INTRAVENOUS AS NEEDED
Status: DISCONTINUED | OUTPATIENT
Start: 2023-06-13 | End: 2023-06-13 | Stop reason: SURG

## 2023-06-13 RX ORDER — DIPHENHYDRAMINE HYDROCHLORIDE 50 MG/ML
12.5 INJECTION INTRAMUSCULAR; INTRAVENOUS AS NEEDED
Status: DISCONTINUED | OUTPATIENT
Start: 2023-06-13 | End: 2023-06-13

## 2023-06-13 RX ORDER — CEFAZOLIN SODIUM/WATER 2 G/20 ML
SYRINGE (ML) INTRAVENOUS
Status: DISCONTINUED
Start: 2023-06-13 | End: 2023-06-13

## 2023-06-13 RX ORDER — DOCUSATE SODIUM 100 MG/1
100 CAPSULE, LIQUID FILLED ORAL 2 TIMES DAILY
Qty: 20 CAPSULE | Refills: 1 | Status: SHIPPED | OUTPATIENT
Start: 2023-06-13

## 2023-06-13 RX ORDER — SODIUM CHLORIDE, SODIUM LACTATE, POTASSIUM CHLORIDE, CALCIUM CHLORIDE 600; 310; 30; 20 MG/100ML; MG/100ML; MG/100ML; MG/100ML
INJECTION, SOLUTION INTRAVENOUS CONTINUOUS
Status: DISCONTINUED | OUTPATIENT
Start: 2023-06-13 | End: 2023-06-13

## 2023-06-13 RX ORDER — NEOSTIGMINE METHYLSULFATE 1 MG/ML
INJECTION, SOLUTION INTRAVENOUS AS NEEDED
Status: DISCONTINUED | OUTPATIENT
Start: 2023-06-13 | End: 2023-06-13 | Stop reason: SURG

## 2023-06-13 RX ORDER — DEXAMETHASONE SODIUM PHOSPHATE 4 MG/ML
VIAL (ML) INJECTION AS NEEDED
Status: DISCONTINUED | OUTPATIENT
Start: 2023-06-13 | End: 2023-06-13 | Stop reason: SURG

## 2023-06-13 RX ORDER — HYDROCODONE BITARTRATE AND ACETAMINOPHEN 5; 325 MG/1; MG/1
1-2 TABLET ORAL EVERY 6 HOURS PRN
Qty: 40 TABLET | Refills: 0 | Status: SHIPPED | OUTPATIENT
Start: 2023-06-13

## 2023-06-13 RX ORDER — LIDOCAINE HYDROCHLORIDE AND EPINEPHRINE 10; 10 MG/ML; UG/ML
INJECTION, SOLUTION INFILTRATION; PERINEURAL AS NEEDED
Status: DISCONTINUED | OUTPATIENT
Start: 2023-06-13 | End: 2023-06-13 | Stop reason: HOSPADM

## 2023-06-13 RX ORDER — ROCURONIUM BROMIDE 10 MG/ML
INJECTION, SOLUTION INTRAVENOUS AS NEEDED
Status: DISCONTINUED | OUTPATIENT
Start: 2023-06-13 | End: 2023-06-13 | Stop reason: SURG

## 2023-06-13 RX ORDER — ACETAMINOPHEN 500 MG
1000 TABLET ORAL ONCE
Status: DISCONTINUED | OUTPATIENT
Start: 2023-06-13 | End: 2023-06-13 | Stop reason: HOSPADM

## 2023-06-13 RX ORDER — ACETAMINOPHEN 325 MG/1
650 TABLET ORAL ONCE
Status: DISCONTINUED | OUTPATIENT
Start: 2023-06-13 | End: 2023-06-13

## 2023-06-13 RX ORDER — HEPARIN SODIUM 5000 [USP'U]/ML
INJECTION, SOLUTION INTRAVENOUS; SUBCUTANEOUS
Status: COMPLETED
Start: 2023-06-13 | End: 2023-06-13

## 2023-06-13 RX ORDER — LIDOCAINE HYDROCHLORIDE 10 MG/ML
INJECTION, SOLUTION EPIDURAL; INFILTRATION; INTRACAUDAL; PERINEURAL AS NEEDED
Status: DISCONTINUED | OUTPATIENT
Start: 2023-06-13 | End: 2023-06-13 | Stop reason: SURG

## 2023-06-13 RX ADMIN — SODIUM CHLORIDE, SODIUM LACTATE, POTASSIUM CHLORIDE, CALCIUM CHLORIDE: 600; 310; 30; 20 INJECTION, SOLUTION INTRAVENOUS at 17:07:00

## 2023-06-13 RX ADMIN — ONDANSETRON 4 MG: 2 INJECTION INTRAMUSCULAR; INTRAVENOUS at 15:30:00

## 2023-06-13 RX ADMIN — GLYCOPYRROLATE 0.7 MG: 0.2 INJECTION, SOLUTION INTRAMUSCULAR; INTRAVENOUS at 17:05:00

## 2023-06-13 RX ADMIN — LIDOCAINE HYDROCHLORIDE 100 MG: 10 INJECTION, SOLUTION EPIDURAL; INFILTRATION; INTRACAUDAL; PERINEURAL at 15:22:00

## 2023-06-13 RX ADMIN — DEXAMETHASONE SODIUM PHOSPHATE 8 MG: 4 MG/ML VIAL (ML) INJECTION at 15:29:00

## 2023-06-13 RX ADMIN — ROCURONIUM BROMIDE 50 MG: 10 INJECTION, SOLUTION INTRAVENOUS at 15:22:00

## 2023-06-13 RX ADMIN — NEOSTIGMINE METHYLSULFATE 4 MG: 1 INJECTION, SOLUTION INTRAVENOUS at 17:05:00

## 2023-06-13 RX ADMIN — SODIUM CHLORIDE, SODIUM LACTATE, POTASSIUM CHLORIDE, CALCIUM CHLORIDE: 600; 310; 30; 20 INJECTION, SOLUTION INTRAVENOUS at 15:17:00

## 2023-06-13 RX ADMIN — CEFAZOLIN SODIUM/WATER 2 G: 2 G/20 ML SYRINGE (ML) INTRAVENOUS at 15:24:00

## 2023-06-13 NOTE — BRIEF OP NOTE
Pre-Operative Diagnosis: Malignant neoplasm of overlapping sites of left breast in female, estrogen receptor positive ([facility] [address] [C50.812, Z17.0]     Post-Operative Diagnosis: Malignant neoplasm of overlapping sites of left breast in female, estrogen receptor positive ([facility] [address] [C50.812, Z17.0]      Procedure Performed:   Right breast balancing reduction     Surgeon(s) and Role:     Mariano Castañeda MD - Primary    Assistant(s):  PA: DAVIDE Bradshaw     Surgical Findings: see dictation     Specimen: see pathology     Estimated Blood Loss: Blood Output: 10 mL (6/13/2023  4:57 PM)    DAVIDE Jung  6/13/2023  5:13 PM

## 2023-06-13 NOTE — ANESTHESIA PROCEDURE NOTES
Airway  Date/Time: 6/13/2023 3:22 PM  Urgency: elective    Airway not difficult    General Information and Staff    Patient location during procedure: OR  Anesthesiologist: Aron Colin MD  Performed: anesthesiologist   Performed by: Aron Colin MD  Authorized by: Aron Colin MD      Indications and Patient Condition  Indications for airway management: anesthesia  Spontaneous Ventilation: absent  Sedation level: deep  Preoxygenated: yes  Patient position: sniffing  Mask difficulty assessment: 0 - not attempted    Final Airway Details  Final airway type: endotracheal airway      Successful airway: ETT  Cuffed: yes   Successful intubation technique: direct laryngoscopy  Facilitating devices/methods: intubating stylet  Endotracheal tube insertion site: oral  Blade: Erick  Blade size: #3  ETT size (mm): 7.0    Cormack-Lehane Classification: grade IIB - view of arytenoids or posterior of glottis only  Placement verified by: capnometry   Cuff volume (mL): 11  Measured from: lips  ETT to lips (cm): 22  Number of attempts at approach: 1  Number of other approaches attempted: 0

## 2023-06-13 NOTE — ANESTHESIA POSTPROCEDURE EVALUATION
[facility] Patient Status:  Hospital Outpatient Surgery   Age/Gender 52year old female MRN [id number]   Location [facility] Attending Myla Kayser., MD   Hosp Day # 0 PCP Tacho Robison MD       Anesthesia Post-op Note    Right breast balancing reduction     Procedure Summary     Date: 06/13/23 Room / Location: [facility] OR 04 / [facility] OR    Anesthesia Start: 6197 Anesthesia Stop:     Procedure: Right breast balancing reduction (Right: Breast) Diagnosis:       Malignant neoplasm of overlapping sites of left breast in female, estrogen receptor positive ([address]      (Malignant neoplasm of overlapping sites of left breast in female, estrogen receptor positive ([facility] [address] [P31.819, Z17.0])    Surgeons: Myla Kayser., MD Anesthesiologist: Cayla William MD    Anesthesia Type: general ASA Status: 2          Anesthesia Type: general    Vitals Value Taken Time   /83 06/13/23 1719   Temp 97 06/13/23 1721   Pulse 94 06/13/23 1720   Resp 20 06/13/23 1720   SpO2 98 % 06/13/23 1720   Vitals shown include unvalidated device data. Patient Location: PACU    Anesthesia Type: general    Airway Patency: patent and extubated    Postop Pain Control: adequate    Mental Status: preanesthetic baseline    Nausea/Vomiting: none    Cardiopulmonary/Hydration status: stable euvolemic    Complications: no apparent anesthesia related complications    Postop vital signs: stable    Dental Exam: Unchanged from Preop    Patient to be discharged from PACU when criteria met.

## 2023-06-14 NOTE — OPERATIVE REPORT
[facility]    PATIENT'S NAME: Adriano Fuchs   ATTENDING PHYSICIAN: Alcira Mccarty MD   OPERATING PHYSICIAN: Alcira Mccarty MD   PATIENT ACCOUNT#:   [id number]    LOCATION:  [address]  MEDICAL RECORD #:   [id number]       YOB: 1970  ADMISSION DATE:       06/13/2023      OPERATION DATE:  06/13/2023    OPERATIVE REPORT      PREOPERATIVE DIAGNOSIS:  History of left-sided breast cancer, status post mastectomy and radiation; breast asymmetry; right breast macromastia. POSTOPERATIVE DIAGNOSIS:  History of left-sided breast cancer, status post mastectomy and radiation; breast asymmetry; right breast macromastia. PROCEDURE:  Right breast balancing reduction (223 g). ASSISTANT:  Silverio Cr PA-C. ANESTHESIA:  General endotracheal anesthesia. COMPLICATIONS:  None. BLOOD LOSS:  Minimal.    INDICATIONS:  This is a 49-year-old female who had a history of left-sided breast cancer for which she underwent mastectomy and radiation therapy. She now presents with significant breast asymmetry and right-sided breast macromastia. She was seen to discuss right breast balancing reduction. Potential risks, complications, benefits and alternatives were discussed. Risks and complications including, but are not limited to, infection, bleeding, scarring, damage to surrounding structures, hematoma, seroma, nipple-areolar necrosis, breast necrosis, skin necrosis, wound dehiscence, delayed healing, need for further surgery. The patient understands and wishes to proceed. Questions were answered. OPERATIVE TECHNIQUE:  The patient was identified in the preoperative area. Informed consent was obtained. The site was marked. The patient was then brought back to the operating room, placed in supine position. She was adequately padded. Sequential compression devices were applied. Perioperative antibiotics were given.   Then, her entire chest and breasts were prepped and draped in the usual sterile fashion. Then, the procedure was started with marking her nipple-areolar complex similar to the left side at 4 cm. Then, a periareolar incision was made using 10 blade and then the wide skin excision margin was confirmed. The 10 blade was used to make those incisions as well. Then, the skin was de-epithelialized. Then, the lateral breast skin flaps were elevated and inferior pedicle and central mound pedicle were maintained. Then, the medial breast flaps were elevated as well. Then, lateral pillar breast parenchyma was dissected, followed by reduction of portion and large portion on the superior aspect as well. A small portion medially and laterally as well inframammary fold. Breast tissue removed was 223 g. Patient was placed in the sitting position several times throughout the case to check for symmetry. Then, hemostasis was assured and the temporary staples were placed. Acceptable symmetry was noted and then the inset of the breast flaps was performed. This was done with 3-0 Vicryl sutures for the deep dermal layer at T-junction and inframammary fold as well as the vertical incision and the periareolar incision. Then, the skin was closed with 4-0 Monocryl subcuticular sutures. This was all done overlying a 15 round BRUNA suction drain which was secured as well. Then, the incision was covered with Dermabond and Steri-Strips. Fluffs and a bra were applied. The patient tolerated the procedure well and awoke from anesthesia without difficulty. All sponge, instrument, and needle counts were correct at the end of the case. Dictated By Idalmis Looney.  Marguerite Lagunas MD  d: 06/13/2023 17:41:36  t: 06/13/2023 21:17:20  Natalia Stevens [id number]  [facility]/

## 2023-06-19 ENCOUNTER — OFFICE VISIT (OUTPATIENT)
Dept: SURGERY | Facility: CLINIC | Age: 53
End: 2023-06-19
Payer: COMMERCIAL

## 2023-06-19 DIAGNOSIS — N64.89 BREAST ASYMMETRY: Primary | ICD-10-CM

## 2023-06-19 PROCEDURE — 99024 POSTOP FOLLOW-UP VISIT: CPT | Performed by: PHYSICIAN ASSISTANT

## 2023-06-19 NOTE — PROGRESS NOTES
Corona Cota is a 52year old female who presents today for a follow-up after right breast balancing reduction on 6/13/2023. She denies fever and chills. She denies nausea, vomiting, diarrhea or constipation. Her pain is controlled. Physical Exam     Breasts: Right breast incisions clean, dry and intact without wound drainage or wound dehiscence. Right breast skin is without erythema, necrosis, skin breakdown. There is resolving ecchymosis on the right. No evidence of hematoma or seroma. Right nipple viable. Right drain site clean, dry and intact. There were no vitals filed for this visit. Assessment and Plan     Corona Cota is doing well s/p right breast balancing reduction on 6/13/2023. The right breast drain was removed today due to low output. Neosporin, gauze, Tegaderm was placed. She tolerated this well. New Steri-Strips were placed over the breast incisions. She should continue with compression and activity restrictions. We reviewed reasons to contact our office. She will follow-up in 1 week with Dr. Julius Loomis for recheck. Questions were answered. Patient understands.      J Luis Alfaro  6/19/2023  3:39 PM

## 2023-06-20 DIAGNOSIS — C50.812 MALIGNANT NEOPLASM OF OVERLAPPING SITES OF LEFT BREAST IN FEMALE, ESTROGEN RECEPTOR POSITIVE (HCC): Primary | ICD-10-CM

## 2023-06-20 DIAGNOSIS — Z17.0 MALIGNANT NEOPLASM OF OVERLAPPING SITES OF LEFT BREAST IN FEMALE, ESTROGEN RECEPTOR POSITIVE (HCC): Primary | ICD-10-CM

## 2023-06-28 ENCOUNTER — OFFICE VISIT (OUTPATIENT)
Dept: SURGERY | Facility: CLINIC | Age: 53
End: 2023-06-28
Payer: COMMERCIAL

## 2023-06-28 DIAGNOSIS — N64.89 BREAST ASYMMETRY: Primary | ICD-10-CM

## 2023-06-28 PROCEDURE — 99024 POSTOP FOLLOW-UP VISIT: CPT | Performed by: SURGERY

## 2023-07-12 ENCOUNTER — OFFICE VISIT (OUTPATIENT)
Dept: OCCUPATIONAL MEDICINE | Facility: HOSPITAL | Age: 53
End: 2023-07-12
Attending: INTERNAL MEDICINE
Payer: COMMERCIAL

## 2023-07-12 DIAGNOSIS — N64.89 BREAST ASYMMETRY: Primary | ICD-10-CM

## 2023-07-12 PROCEDURE — 97140 MANUAL THERAPY 1/> REGIONS: CPT

## 2023-07-12 NOTE — PROGRESS NOTES
Diagnosis:   Lymphedema of left arm (I89.0)     Referring Provider: Indu Velasquez  Date of Evaluation:    5/10/2023    Precautions:  Lymphedema; HTN; h/o CKD Next MD visit:   none scheduled  Date of Surgery: n/a   Insurance Primary/Secondary: [employer] [place] / N/A # Authorized Visits: 8/26            Next MD/Plan Renewal Date: 10/10/2023         Progress Summary  Pt has attended 8/18 visits in Occupational Therapy. Subjective:   Pt reports a couple of weeks ago she returned to bandaging of her Left UE 3-4 nights/week. Is not bandaging during the day; mostly wearing her [employer]st Relax nighttime garment during the day. Has not attempted to resume using her Flexitouch Plus device. During today's session, shared that she is not planning on obtaining any new garments; states she \"has enough garments;\" unable to afford new garments. Post Right breast reduction surgery she has been wearing a bra \"24/7. \" Is concerned today about a section of her scar which has had a change in its appearance. Assessment:   Pt returns today after last being seen on 6/7 d/t recuperating from Right breast reduction surgery on 6/13. She returns with a rise in Left UE lymphedema volume and density to near baseline levels. Due to this, she will need to resume complete decongestive therapy with an extension in the number of her visits to assist her with reduction in her limb volume and improvement in tissue quality to reduce her infection risk. She will in all likelihood need new garments at the end of her course of treatment, but she is resistive to this recommendation at this time. This therapist will be assisting her with finding out what her current insurance benefits are relative to custom-fit garments. Objective:  Pt returns to Occupational Therapy today for complete decongestive therapy of her Left upper quadrant. Both pt and therapist contacted surgeon's office re: change in pt's surgical scar.    EDEMA/TISSUE QUALITY:   *Left UE circumferential volume increased since last session to near baseline level at 393.1cm. Left UE is 37.5cm larger than dominant Right. *Left UE:  Tissue quality over medial surfaces of upper arm/elbow is boggy to slightly boggy, slightly pitting; lateral surfaces are slightly boggy; good superficial tissue mobility. Ventral surface of forearm is densely boggy to boggy, non-pitting with poor to fair superficial tissue mobility; dorsal surface is boggy, slightly pitting with fair superficial tissue mobility. Dorsum of hand is boggy, pitting; fingers are slightly boggy. *Left breast/trunk:  Minimal, slightly boggy lymphedema over inferior surface of breast. Subtle, lymphedema over posterior trunk superior to scapular spine and adjacent to axilla. *1.5cm x 0.5cm whitish tissue opening at intersection between Right vertical scar and inframammary crease scar. TREATMENT:  *Re-assessment of status   *Discussed increase in volume and tissue density of Left UE; need to return to short stretch compression bandaging for all hours of the day except for showering. Explained that Leonor will not reduce her limb volume as needed and that she should not be wearing her prior custom-fit garments d/t their ill-fit at this time. Pt sharing she feels she may struggle with having to wear bandages for recommended hours. Education provided in potential benefits of Circaid arm reduction garment. Pt stating interest in garment. Discussed need for reduction in volume prior to being measured for new garments. At this point, pt stating that she does not plan to obtain new garments as she cannot afford them and because she \"has enough garments. \" Discussed determining pt's current financial responsibility for garments by checking with garment provider; pt agreeable with therapist's assistance with this.  Also discussed therapist reviewing pt's recent garment measurements with garment provider to determine if she would be able to return to using her prior garments. During discussion, re-explained need for reducing limb volume and improving tissue quality to avoid infection. *Left upper quadrant manual lymph drainage with soft tissue mobilization of dense areas of lymphedema. *Unable to apply bandage system as pt left bandages at home. Pt in agreement to don bandages upon return to home. *Recommended resuming use of Flexitouch Plus device, but using limb-only program vs full program.   COMPRESSION:  *Left UE: stockinet; 1/4\" foam insert over dorsum of hand/wrist crease; Cellona wrap; 3 short stretch compression bandages: 1, 4cm, 1, 8cm, 1, 10cm     Goals:  (to be met in 18 visits)  1. Pt will be independent in decongestive exercises. ACHIEVED   2. Pt will be independent in self-manual lymph drainage. 3. Pt will obtain necessary supplies for reduction phase of therapy. ACHIEVED   4. Pt will tolerate Left UE short stretch compression bandaging for 20-23 hours. ACHIEVED   5. Pt/Caregiver will be independent in Left UE short stretch compression bandaging. ACHIEVED     6. Reduce Left UE lymphedema volume by 20-25cm to improve pt's comfort and self-esteem and allow pt to be re-fit for custom-fit garments. 7. Reduce Left UE lymphedema density to slightly boggy to reduce infection risk. 8. Pt will be independent in use of compression garments, self-manual lymph drainage, decongestive exercises and lymphedema precautions for life-long self-management of lymphedema. Plan: Extend skilled Occupational Therapy 2 x/week or a total of 18 visits over a 90 day period. Treatment will include: complete decongestive therapy of Leftp upper quadrant; assist with garment prescription. Patient/Family/Caregiver was advised of these findings, precautions, and treatment options and has agreed to actively participate in planning and for this course of care.     Thank you for your referral. If you have any questions, please contact me at Dept: [phone]. Sincerely,  Electronically signed by therapist: Tsering Jacob OT     Physician's certification required:  Yes  Please co-sign or sign and return this letter via fax as soon as possible to [phone]. I certify the need for these services furnished under this plan of treatment and while under my care. X___________________________________________________ Date____________________    Certification From: 1/94/7510  To:10/10/2023    Plan:   Pt has previously scheduled reconstructive surgery for Right breast on 6/13, following which she will have a period of recovery. She is scheduled to return to therapy on 7/12. Charges: 4 Manual Therapy    Total Timed Treatment: 55 min  Total Treatment Time: 60 min         UE Circumferential Measurements (cm) 5/10/2023  LEFT 5/10/2023  RIGHT 5/31/2023  LEFT   7/12/2023  LEFT 7/12/2023  RIGHT   Axilla 37.0 35.0 37.3 37.3 34.8   **12cm above elbow crease 37.2 35.0 36.6 36.8 35.0   10cm above elbow crease 37.0 35.0 36.7 36.5 34.3   5cm above elbow crease 37.5 33.8 36.6 37.0 34.0   Elbow crease 28.5 25.8 27.0 28.1 25.5          **17cm above ulnar styloid   30.9   25.8   29.7   30.7   25.6   15cm above ulnar styloid 30.7 25.6 29.7 30.5 25.4   10cm above ulnar styloid 28.0 23.2 27.1 28.1 23.6   5cm above ulnar styloid 23.0 18.7 21.8 22.9 19.0   Ulnar styloid 17.5 15.2 17.5 18.7 15.4     Mid-palm   18.7   18.1   18.7   19.2   17.7   Across MCPs 18.3 18.2 18.2 18.2 18.0   Thumb P1 6.3 6.1 6.1 6.2 6.1   Index P1 6.7 6.2 6.5 6.5 6.5   P2 5.3 5.1 5.1 5.2 5.0   Middle P1 6.4 5.9 6.4 6.4 5.9   P2 5.3 5.3 5.3 5.1 5.3   Ring P1 5.9 5.2 5.9 5.8 5.3   P2 4.8 4.7 4.8 4.7 4.6   Little P1 5.3 4.8 5.3 5.2 4.7   P2 4.2 4.0 4.1 4.0 3.9     TOTALS   394.5   356.7   386. 4   393.1   355.6

## 2023-07-18 ENCOUNTER — NURSE ONLY (OUTPATIENT)
Dept: SURGERY | Facility: CLINIC | Age: 53
End: 2023-07-18
Payer: COMMERCIAL

## 2023-07-18 ENCOUNTER — OFFICE VISIT (OUTPATIENT)
Dept: OCCUPATIONAL MEDICINE | Facility: HOSPITAL | Age: 53
End: 2023-07-18
Attending: INTERNAL MEDICINE
Payer: COMMERCIAL

## 2023-07-18 DIAGNOSIS — N64.89 BREAST ASYMMETRY: Primary | ICD-10-CM

## 2023-07-18 PROCEDURE — 97140 MANUAL THERAPY 1/> REGIONS: CPT

## 2023-07-18 PROCEDURE — 99024 POSTOP FOLLOW-UP VISIT: CPT

## 2023-07-18 NOTE — PROGRESS NOTES
Kaylee Corado is a 52year old female who presents today for a follow-up after right breast balancing reduction on 6/13/2023. She denies fever and chills. She denies nausea, vomiting, diarrhea or constipation. Her pain is controlled. Physical Exam     Breast: The right breast incision is clean, dry, and intact. There is no evidence of hematoma or seroma. No evidence of wound dehiscence or necrosis. The T-Junction has a 1.5 cm area of delayed wound healing with spitting Vicryl suture present. Negative for erythema or wound drainage. There were no vitals filed for this visit. Assessment and Plan     Kaylee Corado is doing well s/p right breast balancing reduction on 6/13/2023. She is here for a wound recheck. The patient first noticed a stitch that was protruding out of her incision on 7/12/2023. There were no associated symptoms reported including fever, chills, swelling, or redness of the incision. The patient was prescribed Mupirocin ointment on 7/12/2023 and she has been compliant with application since. The site was redressed today with Neosporin, Xeroform, Telfa, and Paper tape. Dressing changes and instructions were reviewed with the patient and supplies were given. The patient was instructed to continue Mupirocin ointment twice daily with her dressing changes. She was also instructed to implement wound healing supplementation such as Faheem. She will follow up with either myself or a nurse in 2 weeks for wound recheck. She was encouraged to call the office with any questions or concerns. Questions were answered. Patient understands.      GÓMEZ Romero  7/18/2023  10:35 AM

## 2023-07-18 NOTE — PROGRESS NOTES
Diagnosis:   Lymphedema of left arm (I89.0)     Referring Provider: Vilma Lord  Date of Evaluation:    5/10/2023    Precautions:  Lymphedema; HTN; h/o CKD Next MD visit:   none scheduled  Date of Surgery: n/a   Insurance Primary/Secondary: [employer] [place] / N/A # Authorized Visits: 9/26            Next MD/Plan Renewal Date: 10/10/2023          Subjective:   *Pt reports she traveled out-of-town this weekend and wore her custom-fit garments vs travel with bandages. Decided to wear garments again today as she was coming to session. *Reports had follow-up visit at surgeon's office. Was advised that her body is making \"cysts\" around her sutures which are causing sutures to come out of skin; she is to continue with prescription antibiotic with her dressing changes, which includes xeroform. Assessment:   Pt with some softening of Left forearm lymphedema density by end of session. Will need to return to short stretch compression bandaging for optimal results with volume reduction and improvement in tissue quality. Objective:  Pt arrived wearing Left custom-fit sleeve and glove. OBSERVATION:     *Left UE:  Tissue quality over medial surfaces of upper arm/elbow is boggy to slightly boggy, slightly pitting; lateral surfaces are slightly boggy; good superficial tissue mobility. Ventral surface of forearm is densely boggy to boggy, non-pitting with poor to fair superficial tissue mobility; dorsal surface is boggy, slightly pitting with fair superficial tissue mobility. Dorsum of hand is boggy, pitting; fingers are slightly boggy. *Left breast/trunk:  Minimal, slightly boggy lymphedema over inferior surface of breast. Subtle, lymphedema over posterior trunk superior to scapular spine and adjacent to axilla. *7/18** Right vertical scar and inframammary crease scars not observed. MEASUREMENTS:   None taken   TREATMENT:  *Advised pt of her insurance benefits for Circaid arm reduction garment.  Pt in agreement to move forward with ordering garment. *Left upper quadrant manual lymph drainage with soft tissue mobilization of dense areas of lymphedema with extended focus over forearm. *Applied bandage system, sans 1/4\" foam insert which pt left at home. Advised pt to resume daily short stretch compression bandaging. COMPRESSION:  *Left UE: stockinet; (DEFERRED -- 1/4\" foam insert over dorsum of hand/wrist crease); Cellona wrap; 3 short stretch compression bandages: 1, 4cm, 1, 8cm, 1, 10cm     Goals:  (to be met in 18 visits)  1. Pt will be independent in decongestive exercises. ACHIEVED   2. Pt will be independent in self-manual lymph drainage. 3. Pt will obtain necessary supplies for reduction phase of therapy. ACHIEVED   4. Pt will tolerate Left UE short stretch compression bandaging for 20-23 hours. ACHIEVED   5. Pt/Caregiver will be independent in Left UE short stretch compression bandaging. ACHIEVED     6. Reduce Left UE lymphedema volume by 20-25cm to improve pt's comfort and self-esteem and allow pt to be re-fit for custom-fit garments. 7. Reduce Left UE lymphedema density to slightly boggy to reduce infection risk. 8. Pt will be independent in use of compression garments, self-manual lymph drainage, decongestive exercises and lymphedema precautions for life-long self-management of lymphedema. Plan:   Continue current plan. Fit Circaid arm reduction once it arrives.      Charges: 4 Manual Therapy    Total Timed Treatment: 55 min  Total Treatment Time: 60 min         UE Circumferential Measurements (cm) 5/10/2023  LEFT 5/10/2023  RIGHT 5/31/2023  LEFT   7/12/2023  LEFT 7/12/2023  RIGHT   Axilla 37.0 35.0 37.3 37.3 34.8   **12cm above elbow crease 37.2 35.0 36.6 36.8 35.0   10cm above elbow crease 37.0 35.0 36.7 36.5 34.3   5cm above elbow crease 37.5 33.8 36.6 37.0 34.0   Elbow crease 28.5 25.8 27.0 28.1 25.5          **17cm above ulnar styloid   30.9   25.8   29.7   30.7   25.6   15cm above ulnar styloid 30.7 25.6 29.7 30.5 25.4   10cm above ulnar styloid 28.0 23.2 27.1 28.1 23.6   5cm above ulnar styloid 23.0 18.7 21.8 22.9 19.0   Ulnar styloid 17.5 15.2 17.5 18.7 15.4     Mid-palm   18.7   18.1   18.7   19.2   17.7   Across MCPs 18.3 18.2 18.2 18.2 18.0   Thumb P1 6.3 6.1 6.1 6.2 6.1   Index P1 6.7 6.2 6.5 6.5 6.5   P2 5.3 5.1 5.1 5.2 5.0   Middle P1 6.4 5.9 6.4 6.4 5.9   P2 5.3 5.3 5.3 5.1 5.3   Ring P1 5.9 5.2 5.9 5.8 5.3   P2 4.8 4.7 4.8 4.7 4.6   Little P1 5.3 4.8 5.3 5.2 4.7   P2 4.2 4.0 4.1 4.0 3.9     TOTALS   394.5   356.7   386. 4   393.1   355.6

## 2023-07-20 ENCOUNTER — OFFICE VISIT (OUTPATIENT)
Dept: OCCUPATIONAL MEDICINE | Facility: HOSPITAL | Age: 53
End: 2023-07-20
Attending: INTERNAL MEDICINE
Payer: COMMERCIAL

## 2023-07-20 PROCEDURE — 97140 MANUAL THERAPY 1/> REGIONS: CPT

## 2023-07-20 NOTE — PROGRESS NOTES
Diagnosis:   Lymphedema of left arm (I89.0)     Referring Provider: Don Jordan  Date of Evaluation:    5/10/2023    Precautions:  Lymphedema; HTN; h/o CKD Next MD visit:   none scheduled  Date of Surgery: n/a   Insurance Primary/Secondary: [employer] / N/A # Authorized Visits: 10/26            Next MD/Plan Renewal Date: 10/10/2023          Subjective:   *Pt reports she wore Left bandage system applied at last session overnight until later morning the following day. Did wake up in the night to re-wrap because bandages felt tight around her hand. After showering wore her compression sleeve and re-donned bandages in the evening. Repeated the same for last night and today. Stating she could not re-apply her bandages today because she had chores to do -- wash dishes, do laundry, clean litter box. *Completed order for Circaid reduction garment today. Was advised garment should arrive in 7-10 business days. *Is following surgeon office's recommendations for scar car. Sharing that she is having some discomfort from products; not sure which one is causing the discomfort; is thinking about substituting a tape that she has at home to rule out if the surgeon's tape is the irritant. Assessment:   While pt is wearing bandages over night, she needs to extend use to daytime use for optimal effect. Good response to session techniques. Objective:  Pt arrived with no compression on. OBSERVATION:     *Left UE:  Tissue quality over medial surfaces of upper arm/elbow is boggy to slightly boggy, slightly pitting; lateral surfaces are slightly boggy; good superficial tissue mobility. Ventral surface of forearm is densely boggy to boggy, non-pitting with poor to fair superficial tissue mobility; dorsal surface is boggy, slightly pitting with fair superficial tissue mobility. Dorsum of hand is boggy, pitting; fingers are slightly boggy.    *Left breast/trunk:  Minimal, slightly boggy lymphedema over inferior surface of breast. Subtle, lymphedema over posterior trunk superior to scapular spine and adjacent to axilla. *7/20** Right vertical scar and inframammary crease scars not observed. MEASUREMENTS:   None taken   TREATMENT:  *Left upper quadrant manual lymph drainage with soft tissue mobilization of dense areas of lymphedema with extended focus over forearm. Observed reduction in volume over dorsum of hand with improved forearm superficial tissue mobility by end of session. *Advised pt to contact her surgeon's office for guidance with scar management dressings vs trying to figure it out on her own. *Advised pt to begin using arm program for Flexitouch Plus device, followed by application of bandages. *Applied bandage system. Advised pt to resume daily short stretch compression bandaging. COMPRESSION:  *Left UE: stockinet; 1/4\" foam insert over dorsum of hand/wrist crease; Cellona wrap; 3 short stretch compression bandages: 1, 4cm, 1, 8cm, 1, 10cm     Goals:  (to be met in 18 visits)  1. Pt will be independent in decongestive exercises. ACHIEVED   2. Pt will be independent in self-manual lymph drainage. 3. Pt will obtain necessary supplies for reduction phase of therapy. ACHIEVED   4. Pt will tolerate Left UE short stretch compression bandaging for 20-23 hours. ACHIEVED   5. Pt/Caregiver will be independent in Left UE short stretch compression bandaging. ACHIEVED     6. Reduce Left UE lymphedema volume by 20-25cm to improve pt's comfort and self-esteem and allow pt to be re-fit for custom-fit garments. 7. Reduce Left UE lymphedema density to slightly boggy to reduce infection risk. 8. Pt will be independent in use of compression garments, self-manual lymph drainage, decongestive exercises and lymphedema precautions for life-long self-management of lymphedema. Plan:   Continue current plan. Fit Circaid arm reduction once it arrives.      Charges: 4 Manual Therapy    Total Timed Treatment: 55 min  Total Treatment Time: 60 min         UE Circumferential Measurements (cm) 5/10/2023  LEFT 5/10/2023  RIGHT 5/31/2023  LEFT   7/12/2023  LEFT 7/12/2023  RIGHT   Axilla 37.0 35.0 37.3 37.3 34.8   **12cm above elbow crease 37.2 35.0 36.6 36.8 35.0   10cm above elbow crease 37.0 35.0 36.7 36.5 34.3   5cm above elbow crease 37.5 33.8 36.6 37.0 34.0   Elbow crease 28.5 25.8 27.0 28.1 25.5          **17cm above ulnar styloid   30.9   25.8   29.7   30.7   25.6   15cm above ulnar styloid 30.7 25.6 29.7 30.5 25.4   10cm above ulnar styloid 28.0 23.2 27.1 28.1 23.6   5cm above ulnar styloid 23.0 18.7 21.8 22.9 19.0   Ulnar styloid 17.5 15.2 17.5 18.7 15.4     Mid-palm   18.7   18.1   18.7   19.2   17.7   Across MCPs 18.3 18.2 18.2 18.2 18.0   Thumb P1 6.3 6.1 6.1 6.2 6.1   Index P1 6.7 6.2 6.5 6.5 6.5   P2 5.3 5.1 5.1 5.2 5.0   Middle P1 6.4 5.9 6.4 6.4 5.9   P2 5.3 5.3 5.3 5.1 5.3   Ring P1 5.9 5.2 5.9 5.8 5.3   P2 4.8 4.7 4.8 4.7 4.6   Little P1 5.3 4.8 5.3 5.2 4.7   P2 4.2 4.0 4.1 4.0 3.9     TOTALS   394.5   356.7   386. 4   393.1   355.6

## 2023-07-25 ENCOUNTER — OFFICE VISIT (OUTPATIENT)
Dept: OCCUPATIONAL MEDICINE | Facility: HOSPITAL | Age: 53
End: 2023-07-25
Attending: INTERNAL MEDICINE
Payer: COMMERCIAL

## 2023-07-25 PROCEDURE — 97140 MANUAL THERAPY 1/> REGIONS: CPT

## 2023-07-26 NOTE — PROGRESS NOTES
Diagnosis:   Lymphedema of left arm (I89.0)     Referring Provider: Vilma Lord  Date of Evaluation:    5/10/2023    Precautions:  Lymphedema; HTN; h/o CKD Next MD visit:   none scheduled  Date of Surgery: n/a   Insurance Primary/Secondary: [employer] [place] PPO / N/A # Authorized Visits: 11/26            Next MD/Plan Renewal Date: 10/10/2023          Subjective:   *Pt reports use of Left UE bandage system on a daily/nightly basis. 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 *Used Flexitouch Plus device once since last session. *Noticed she had a package when leaving for her session. Thinks Circaid arm reduction garment has arrived. *Reporting that after removing bandages, using pump, her volume is reduced, but it returns quickly. *Reports Right surgical scars are healing. Assessment:   Pt demonstrating progress towards reduction of Left hand/finger volume. Improved frequency of use of short stretch compression bandaging, h/e needs to use therapeutic compression for reduction purposes vs using garments in order to obtain desired goal of volume reduction. Objective:  Pt wearing Left UE bandage system. OBSERVATION:   *Improvement in reduction of volume over dorsum of hand and fingers with improved tissue quality. *Left UE:  Tissue quality over medial surfaces of upper arm/elbow is boggy to slightly boggy, slightly pitting; lateral surfaces are slightly boggy; good superficial tissue mobility. Ventral surface of forearm is densely boggy to boggy, non-pitting with poor to fair superficial tissue mobility; dorsal surface is boggy, slightly pitting with fair superficial tissue mobility. Dorsum of hand is boggy to slightly boggy, pitting; fingers are slightly boggy to supple.    *Left breast/trunk:  Minimal, slightly boggy lymphedema over inferior surface of breast. Subtle, lymphedema over posterior trunk superior to scapular spine and adjacent to axilla. *7/25** Right vertical scar and inframammary crease scars not observed. MEASUREMENTS:   None taken   TREATMENT:  *Removal of bandage system. *Left upper quadrant manual lymph drainage with soft tissue mobilization of dense areas of lymphedema with extended focus over forearm. Observed reduction in volume over dorsum of hand with improved forearm superficial tissue mobility by end of session. *Re-education provided in aggressiveness of pt's Left UE lymphedema. *Applied bandage system. COMPRESSION:  *Left UE: stockinet; 1/4\" foam insert over dorsum of hand/wrist crease; Cellona wrap; 3 short stretch compression bandages: 1, 4cm, 1, 8cm, 1, 10cm     Goals:  (to be met in 18 visits)  1. Pt will be independent in decongestive exercises. ACHIEVED   2. Pt will be independent in self-manual lymph drainage. 3. Pt will obtain necessary supplies for reduction phase of therapy. ACHIEVED   4. Pt will tolerate Left UE short stretch compression bandaging for 20-23 hours. ACHIEVED   5. Pt/Caregiver will be independent in Left UE short stretch compression bandaging. ACHIEVED     6. Reduce Left UE lymphedema volume by 20-25cm to improve pt's comfort and self-esteem and allow pt to be re-fit for custom-fit garments. 7. Reduce Left UE lymphedema density to slightly boggy to reduce infection risk. 8. Pt will be independent in use of compression garments, self-manual lymph drainage, decongestive exercises and lymphedema precautions for life-long self-management of lymphedema. Plan:   Continue current plan. Fit Circaid arm reduction once it arrives.      Charges: 4 Manual Therapy    Total Timed Treatment: 55 min  Total Treatment Time: 60 min         UE Circumferential Measurements (cm) 5/10/2023  LEFT 5/10/2023  RIGHT 5/31/2023  LEFT   7/12/2023  LEFT 7/12/2023  RIGHT   Axilla 37.0 35.0 37.3 37.3 34.8   **12cm above elbow crease 37.2 35.0 36.6 36.8 35.0   10cm above elbow crease 37.0 35.0 36.7 36.5 34.3   5cm above elbow crease 37.5 33.8 36.6 37.0 34.0   Elbow crease 28.5 25.8 27.0 28.1 25.5          **17cm above ulnar styloid   30.9   25.8   29.7   30.7   25.6   15cm above ulnar styloid 30.7 25.6 29.7 30.5 25.4   10cm above ulnar styloid 28.0 23.2 27.1 28.1 23.6   5cm above ulnar styloid 23.0 18.7 21.8 22.9 19.0   Ulnar styloid 17.5 15.2 17.5 18.7 15.4     Mid-palm   18.7   18.1   18.7   19.2   17.7   Across MCPs 18.3 18.2 18.2 18.2 18.0   Thumb P1 6.3 6.1 6.1 6.2 6.1   Index P1 6.7 6.2 6.5 6.5 6.5   P2 5.3 5.1 5.1 5.2 5.0   Middle P1 6.4 5.9 6.4 6.4 5.9   P2 5.3 5.3 5.3 5.1 5.3   Ring P1 5.9 5.2 5.9 5.8 5.3   P2 4.8 4.7 4.8 4.7 4.6   Little P1 5.3 4.8 5.3 5.2 4.7   P2 4.2 4.0 4.1 4.0 3.9     TOTALS   394.5   356.7   386. 4   393.1   355.6

## 2023-07-27 ENCOUNTER — OFFICE VISIT (OUTPATIENT)
Dept: OCCUPATIONAL MEDICINE | Facility: HOSPITAL | Age: 53
End: 2023-07-27
Attending: INTERNAL MEDICINE
Payer: COMMERCIAL

## 2023-07-27 PROCEDURE — 97140 MANUAL THERAPY 1/> REGIONS: CPT

## 2023-07-27 NOTE — PROGRESS NOTES
Diagnosis:   Lymphedema of left arm (I89.0)     Referring Provider: Ronel Price  Date of Evaluation:    5/10/2023    Precautions:  Lymphedema; HTN; h/o CKD Next MD visit:   none scheduled  Date of Surgery: n/a   Insurance Primary/Secondary: [employer] [place] P[place] / N/A # Authorized Visits: 12/26            Next MD/Plan Renewal Date: 10/10/2023          Subjective:   *Pt reports use of Left UE bandage system on a daily/nightly basis since last session. *Using Flexitouch Plus device. Assessment:   Pt appears motivated to use Circaid reduction garments. Objective:  Pt arrived without compression on; removed prior coming to session. Brought Circaid arm reduction kit to session. OBSERVATION:   *Improvement in reduction of volume over dorsum of hand and fingers with improved tissue quality. *Left UE:  Tissue quality over medial surfaces of upper arm/elbow is boggy to slightly boggy, slightly pitting; lateral surfaces are slightly boggy; good superficial tissue mobility. Ventral surface of forearm is densely boggy to boggy, non-pitting with poor to fair superficial tissue mobility; dorsal surface is boggy, slightly pitting with fair superficial tissue mobility. Dorsum of hand is boggy to slightly boggy, pitting; fingers are slightly boggy to supple. *Left breast/trunk:  Minimal, slightly boggy lymphedema over inferior surface of breast. Subtle, lymphedema over posterior trunk superior to scapular spine and adjacent to axilla. *7/27** Right vertical scar and inframammary crease scars not observed. MEASUREMENTS:   None taken   TREATMENT:  *Custom fit Circaid arm and reduction kits to pt's Left UE and hand. *Instruction in how to don/remove garments; how to apply appropriate tension on straps; use and care of garment. Pt with good return demonstration of donning garment. *Instruction in loosening of elbow strap for comfort during driving, sleeping.    *Advised pt to use on a daily/nightly basis until next session; can alternate with use of short stretch compression bandaging. *Advised to bring bandages to next session. COMPRESSION:  *Left UE: Circaid arm and hand reduction garments (DEFERRED -- stockinet; 1/4\" foam insert over dorsum of hand/wrist crease; Cellona wrap; 3 short stretch compression bandages: 1, 4cm, 1, 8cm, 1, 10cm)     Goals:  (to be met in 18 visits)  1. Pt will be independent in decongestive exercises. ACHIEVED   2. Pt will be independent in self-manual lymph drainage. 3. Pt will obtain necessary supplies for reduction phase of therapy. ACHIEVED   4. Pt will tolerate Left UE short stretch compression bandaging for 20-23 hours. ACHIEVED   5. Pt/Caregiver will be independent in Left UE short stretch compression bandaging. ACHIEVED     6. Reduce Left UE lymphedema volume by 20-25cm to improve pt's comfort and self-esteem and allow pt to be re-fit for custom-fit garments. 7. Reduce Left UE lymphedema density to slightly boggy to reduce infection risk. 8. Pt will be independent in use of compression garments, self-manual lymph drainage, decongestive exercises and lymphedema precautions for life-long self-management of lymphedema. Plan:   Continue current plan. Fit Circaid arm reduction once it arrives.      Charges: 4 Manual Therapy    Total Timed Treatment: 55 min  Total Treatment Time: 60 min         UE Circumferential Measurements (cm) 5/10/2023  LEFT 5/10/2023  RIGHT 5/31/2023  LEFT   7/12/2023  LEFT 7/12/2023  RIGHT   Axilla 37.0 35.0 37.3 37.3 34.8   **12cm above elbow crease 37.2 35.0 36.6 36.8 35.0   10cm above elbow crease 37.0 35.0 36.7 36.5 34.3   5cm above elbow crease 37.5 33.8 36.6 37.0 34.0   Elbow crease 28.5 25.8 27.0 28.1 25.5          **17cm above ulnar styloid   30.9   25.8   29.7   30.7   25.6   15cm above ulnar styloid 30.7 25.6 29.7 30.5 25.4   10cm above ulnar styloid 28.0 23.2 27.1 28.1 23.6   5cm above ulnar styloid 23.0 18.7 21.8 22.9 19.0   Ulnar styloid 17.5 15.2 17.5 18.7 15.4     Mid-palm   18.7   18.1   18.7   19.2   17.7   Across MCPs 18.3 18.2 18.2 18.2 18.0   Thumb P1 6.3 6.1 6.1 6.2 6.1   Index P1 6.7 6.2 6.5 6.5 6.5   P2 5.3 5.1 5.1 5.2 5.0   Middle P1 6.4 5.9 6.4 6.4 5.9   P2 5.3 5.3 5.3 5.1 5.3   Ring P1 5.9 5.2 5.9 5.8 5.3   P2 4.8 4.7 4.8 4.7 4.6   Little P1 5.3 4.8 5.3 5.2 4.7   P2 4.2 4.0 4.1 4.0 3.9     TOTALS   394.5   356.7   386. 4   393.1   355.6

## 2023-07-31 ENCOUNTER — TELEPHONE (OUTPATIENT)
Dept: INTERNAL MEDICINE CLINIC | Facility: CLINIC | Age: 53
End: 2023-07-31

## 2023-07-31 ENCOUNTER — OFFICE VISIT (OUTPATIENT)
Dept: SURGERY | Facility: CLINIC | Age: 53
End: 2023-07-31
Payer: COMMERCIAL

## 2023-07-31 DIAGNOSIS — N64.89 BREAST ASYMMETRY: Primary | ICD-10-CM

## 2023-07-31 PROCEDURE — 99024 POSTOP FOLLOW-UP VISIT: CPT

## 2023-07-31 NOTE — PROGRESS NOTES
Nesha Lund is a 52year old female who presents today for a follow-up after right breast balancing reduction on 6/13/2023. She has been compliant with dressing care and Mupirocin ointment application. She denies fever and chills. She denies nausea, vomiting, diarrhea or constipation. Her pain is controlled. Physical Exam     Breast: The right breast incision is clean, dry, and intact. There is no evidence of hematoma or seroma. No evidence of wound dehiscence or necrosis. The [place] has a resolving 0.5 cm area of superficial delayed wound healing. Negative for erythema or wound drainage. There were no vitals filed for this visit. Assessment and Plan     Nesha Lund is doing well s/p right breast balancing reduction on 6/13/2023. She is here for a wound recheck. The site was redressed today with Neosporin, Xeroform, Telfa, and Paper tape. Dressing changes and instructions were reviewed with the patient and supplies were given. The patient was instructed to continue Mupirocin ointment twice daily with her dressing changes. She will follow up with me at the end of August for a wound recheck. She was encouraged to call the office with any questions or concerns. Questions were answered. Patient understands.      GÓMEZ Solorzano  7/31/2023  10:13 AM

## 2023-08-01 ENCOUNTER — OFFICE VISIT (OUTPATIENT)
Dept: OCCUPATIONAL MEDICINE | Facility: HOSPITAL | Age: 53
End: 2023-08-01
Attending: INTERNAL MEDICINE
Payer: COMMERCIAL

## 2023-08-01 PROCEDURE — 97140 MANUAL THERAPY 1/> REGIONS: CPT

## 2023-08-01 NOTE — TELEPHONE ENCOUNTER
LMB 8/1 to ask Aurelio to re-fax. PSRs- please inform Aurelio we did not receive fax.  Please ask him to re-fax with attn triage

## 2023-08-01 NOTE — PROGRESS NOTES
Diagnosis:   Lymphedema of left arm (I89.0)     Referring Provider: Eliot Mccormick  Date of Evaluation:    5/10/2023    Precautions:  Lymphedema; HTN; h/o CKD Next MD visit:   none scheduled  Date of Surgery: n/a   Insurance Primary/Secondary: [employer] [place] PPO / N/A # Authorized Visits: 13/26            Next MD/Plan Renewal Date: 10/10/2023          Subjective:   *Pt reports daily use of Left Circaid arm reduction garment without difficulty. Has not used bandages since last session. Relying on Circaid garment and, today, wore her compression sleeve and glove when she was out hiking. Did not want to get Circaid garment dirty. *Has not used Flexitouch Plus device since last session. Was visiting with friends and did not have device with her. *Reports Right breast surgical area is healing well. Assessment:   Pt making progress towards reduction of Left UE lymphedema volume with improving tissue quality. Good tolerance to use of Circaid reduction garment. Would benefit from avoiding use of prior compression garments while undergoing reduction phase of treatment. Objective:  Pt arrived without compression on; removed prior coming to session. OBSERVATION:   *Improving tissue quality throughout Left UE.   *Left UE:  Tissue quality over medial surfaces of upper arm/elbow is boggy to slightly boggy, slightly pitting; lateral surfaces are slightly boggy; good superficial tissue mobility. Ventral surface of forearm is densely boggy to boggy, non-pitting with poor to fair superficial tissue mobility; dorsal surface is boggy, slightly pitting with fair superficial tissue mobility. Dorsum of hand is boggy to slightly boggy, pitting; fingers are slightly boggy to supple. *Left breast/trunk:  Minimal, slightly boggy lymphedema over inferior surface of breast. Subtle, lymphedema over posterior trunk superior to scapular spine and adjacent to axilla.     MEASUREMENTS:   *Left UE circumferential lymphedema volume decreased by 12.0cm since 7/12 for an overall reduction of 13.4cm. Left UE is 25.5cm larger than dominant Right. TREATMENT:  *Volume re-measurement   *Left upper quadrant manual lymph drainage with soft tissue mobilization of dense areas of tissue. Observed improvement in superficial tissue mobility of forearm with reduced hand volume by end of MLD. *Applied bandage system  *Advised to be in bandage system/Circaid garment as much as possible. Avoid deferring to use of compression sleeve/glove. *Requested to bring Circaid garment to next session in the event garment size needs to be reduced. COMPRESSION:  *Left UE: stockinet; 1/4\" foam insert over dorsum of hand/wrist crease; Cellona wrap; 3 short stretch compression bandages: 1, 4cm, 1, 8cm, 1, 10cm (DEFERRED -- Circaid arm and hand reduction garments)     Goals:  (to be met in 18 visits)  1. Pt will be independent in decongestive exercises. ACHIEVED   2. Pt will be independent in self-manual lymph drainage. 3. Pt will obtain necessary supplies for reduction phase of therapy. ACHIEVED   4. Pt will tolerate Left UE short stretch compression bandaging for 20-23 hours. ACHIEVED   5. Pt/Caregiver will be independent in Left UE short stretch compression bandaging. ACHIEVED     6. Reduce Left UE lymphedema volume by 20-25cm to improve pt's comfort and self-esteem and allow pt to be re-fit for custom-fit garments. 7. Reduce Left UE lymphedema density to slightly boggy to reduce infection risk. 8. Pt will be independent in use of compression garments, self-manual lymph drainage, decongestive exercises and lymphedema precautions for life-long self-management of lymphedema. Plan:   Continue current plan.       Charges: 4 Manual Therapy    Total Timed Treatment: 55 min  Total Treatment Time: 60 min         UE Circumferential Measurements (cm) 5/10/2023  LEFT 5/10/2023  RIGHT 5/31/2023  LEFT   7/12/2023  LEFT 7/12/2023  RIGHT   Axilla 37.0 35.0 37.3 37.3 34.8   **12cm above elbow crease 37.2 35.0 36.6 36.8 35.0   10cm above elbow crease 37.0 35.0 36.7 36.5 34.3   5cm above elbow crease 37.5 33.8 36.6 37.0 34.0   Elbow crease 28.5 25.8 27.0 28.1 25.5          **17cm above ulnar styloid   30.9   25.8   29.7   30.7   25.6   15cm above ulnar styloid 30.7 25.6 29.7 30.5 25.4   10cm above ulnar styloid 28.0 23.2 27.1 28.1 23.6   5cm above ulnar styloid 23.0 18.7 21.8 22.9 19.0   Ulnar styloid 17.5 15.2 17.5 18.7 15.4     Mid-palm   18.7   18.1   18.7   19.2   17.7   Across MCPs 18.3 18.2 18.2 18.2 18.0   Thumb P1 6.3 6.1 6.1 6.2 6.1   Index P1 6.7 6.2 6.5 6.5 6.5   P2 5.3 5.1 5.1 5.2 5.0   Middle P1 6.4 5.9 6.4 6.4 5.9   P2 5.3 5.3 5.3 5.1 5.3   Ring P1 5.9 5.2 5.9 5.8 5.3   P2 4.8 4.7 4.8 4.7 4.6   Little P1 5.3 4.8 5.3 5.2 4.7   P2 4.2 4.0 4.1 4.0 3.9     TOTALS   394.5   356.7   386. 4   393.1   355.6            UE Circumferential Measurements (cm) 8/1/2023   LEFT   Axilla 36.2   **12cm above elbow crease 36.0   10cm above elbow crease 36.0   5cm above elbow crease 35.7   Elbow crease 27.1          **17cm above ulnar styloid   29.7   15cm above ulnar styloid 29.3   10cm above ulnar styloid 26.4   5cm above ulnar styloid 22.0   Ulnar styloid 18.0     Mid-palm   18.7   Across MCPs 18.0   Thumb P1 6.0   Index P1 6.3   P2 5.1   Middle P1 6.2   P2 5.2   Ring P1 5.9   P2 4.4   Little P1 5.1   P2 3.8     TOTALS   381.1

## 2023-08-03 ENCOUNTER — OFFICE VISIT (OUTPATIENT)
Dept: OCCUPATIONAL MEDICINE | Facility: HOSPITAL | Age: 53
End: 2023-08-03
Attending: INTERNAL MEDICINE
Payer: COMMERCIAL

## 2023-08-03 PROCEDURE — 97140 MANUAL THERAPY 1/> REGIONS: CPT

## 2023-08-03 NOTE — TELEPHONE ENCOUNTER
Received request from [employer] for ready-made velcro compression arm sleeve. Put in TB bin for review and signature.

## 2023-08-03 NOTE — PROGRESS NOTES
Diagnosis:   Lymphedema of left arm (I89.0)     Referring Provider: Shobha Ospina  Date of Evaluation:    5/10/2023    Precautions:  Lymphedema; HTN; h/o CKD Next MD visit:   none scheduled  Date of Surgery: n/a   Insurance Primary/Secondary: [employer] [place] PPO / N/A # Authorized Visits: 14/26            Next MD/Plan Renewal Date: 10/10/2023          Subjective:   *Pt reports she wore Left UE bandage system until showering yesterday. Replaced with Circaid reduction garment and custom-fit glove. Assessment:   Pt with good follow through with compression for means of volume reduction. Appears excess volume over dorsum of hand may have been caused by too much tension on wrist strap of reduction garment. Making progress towards improvement of Left UE tissue quality. Good response to session techniques. Objective:  Arrived wearing Circaid arm reduction garment and custom-fit compression glove. OBSERVATION:   *Observed increase in dorsal hand volume today. Noted tight application of wrist strap of Circaid garment. *Improving tissue quality in forearm. *Left UE:  Tissue quality over medial surfaces of upper arm/elbow is boggy to slightly boggy, slightly pitting; lateral surfaces are slightly boggy; good superficial tissue mobility. Medial side of ventral surface of forearm is densely boggy to boggy, non-pitting with poor to fair superficial tissue mobility; lateral side and dorsal surface is boggy, slightly pitting with fair superficial tissue mobility. Dorsum of hand is boggy to slightly boggy, pitting; fingers are slightly boggy to supple. *Left breast/trunk:  Minimal, slightly boggy lymphedema over inferior surface of breast. Subtle, lymphedema over posterior trunk superior to scapular spine and adjacent to axilla. MEASUREMENTS:   None taken   TREATMENT:  *Noted strap lengths crossing over to medial surface of UE are now too long.  Pt hesitant/resistive to shortening straps; concerned if volume in limb increases she will not be able to wear garment. Explained purpose of garment and need for appropriate strap lengths. Pt, again, voiced her concern. [place] agreement to allow pt to trim 4 straps by 1cm each. 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 Re-explained need to continue with reduction process until volume stabilizes prior to being measured for new garments. *Discussed increased dorsal hand volume. Advised pt to avoid pulling wrist strap too tight; reminded her that glove is also providing compression for the wrist.   *Left upper quadrant manual lymph drainage with soft tissue mobilization of dense areas of tissue. Observed improvement in superficial tissue mobility of forearm with reduced forearm and hand volume by end of MLD. *Applied bandage system    COMPRESSION:  *Left UE: stockinet; 1/4\" foam insert over dorsum of hand/wrist crease; Cellona wrap; 3 short stretch compression bandages: 1, 4cm, 1, 8cm, 1, 10cm (DEFERRED -- Circaid arm and hand reduction garments)     Goals:  (to be met in 18 visits)  1. Pt will be independent in decongestive exercises. ACHIEVED   2. Pt will be independent in self-manual lymph drainage. 3. Pt will obtain necessary supplies for reduction phase of therapy. ACHIEVED   4. Pt will tolerate Left UE short stretch compression bandaging for 20-23 hours. ACHIEVED   5. Pt/Caregiver will be independent in Left UE short stretch compression bandaging. ACHIEVED     6. Reduce Left UE lymphedema volume by 20-25cm to improve pt's comfort and self-esteem and allow pt to be re-fit for custom-fit garments. 7. Reduce Left UE lymphedema density to slightly boggy to reduce infection risk. 8. Pt will be independent in use of compression garments, self-manual lymph drainage, decongestive exercises and lymphedema precautions for life-long self-management of lymphedema.       Plan:   Continue current plan. Charges: 4 Manual Therapy    Total Timed Treatment: 55 min  Total Treatment Time: 60 min         UE Circumferential Measurements (cm) 5/10/2023  LEFT 5/10/2023  RIGHT 5/31/2023  LEFT   7/12/2023  LEFT 7/12/2023  RIGHT   Axilla 37.0 35.0 37.3 37.3 34.8   **12cm above elbow crease 37.2 35.0 36.6 36.8 35.0   10cm above elbow crease 37.0 35.0 36.7 36.5 34.3   5cm above elbow crease 37.5 33.8 36.6 37.0 34.0   Elbow crease 28.5 25.8 27.0 28.1 25.5          **17cm above ulnar styloid   30.9   25.8   29.7   30.7   25.6   15cm above ulnar styloid 30.7 25.6 29.7 30.5 25.4   10cm above ulnar styloid 28.0 23.2 27.1 28.1 23.6   5cm above ulnar styloid 23.0 18.7 21.8 22.9 19.0   Ulnar styloid 17.5 15.2 17.5 18.7 15.4     Mid-palm   18.7   18.1   18.7   19.2   17.7   Across MCPs 18.3 18.2 18.2 18.2 18.0   Thumb P1 6.3 6.1 6.1 6.2 6.1   Index P1 6.7 6.2 6.5 6.5 6.5   P2 5.3 5.1 5.1 5.2 5.0   Middle P1 6.4 5.9 6.4 6.4 5.9   P2 5.3 5.3 5.3 5.1 5.3   Ring P1 5.9 5.2 5.9 5.8 5.3   P2 4.8 4.7 4.8 4.7 4.6   Little P1 5.3 4.8 5.3 5.2 4.7   P2 4.2 4.0 4.1 4.0 3.9     TOTALS   394.5   356.7   386. 4   393.1   355.6            UE Circumferential Measurements (cm) 8/1/2023   LEFT   Axilla 36.2   **12cm above elbow crease 36.0   10cm above elbow crease 36.0   5cm above elbow crease 35.7   Elbow crease 27.1          **17cm above ulnar styloid   29.7   15cm above ulnar styloid 29.3   10cm above ulnar styloid 26.4   5cm above ulnar styloid 22.0   Ulnar styloid 18.0     Mid-palm   18.7   Across MCPs 18.0   Thumb P1 6.0   Index P1 6.3   P2 5.1   Middle P1 6.2   P2 5.2   Ring P1 5.9   P2 4.4   Little P1 5.1   P2 3.8     TOTALS   381.1

## 2023-08-08 ENCOUNTER — OFFICE VISIT (OUTPATIENT)
Dept: OCCUPATIONAL MEDICINE | Facility: HOSPITAL | Age: 53
End: 2023-08-08
Attending: INTERNAL MEDICINE
Payer: COMMERCIAL

## 2023-08-08 PROCEDURE — 97140 MANUAL THERAPY 1/> REGIONS: CPT

## 2023-08-08 NOTE — PROGRESS NOTES
Diagnosis:   Lymphedema of left arm (I89.0)     Referring Provider: Ray Riso  Date of Evaluation:    5/10/2023    Precautions:  Lymphedema; HTN; h/o CKD Next MD visit:   none scheduled  Date of Surgery: n/a   Insurance Primary/Secondary: [employer] [place] P[place] / N/A # Authorized Visits: 15/26            Next MD/Plan Renewal Date: 10/10/2023          Subjective:   *Pt reports alternating use of Left short stretch compression bandaging with Circaid reduction garment for nearly all hours of the day. *Has not initiated use of Flexitouch Plus device. 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 Assessment:   Pt has been making good progress towards goal of reducing Left UE lymphedema volume and improvement of tissue quality. As of late, she has been much more compliant with the use of compression for reduction purposes. However, pt will now have another hiatus from therapy and, hopefully, she will be able to sustain she has made thus far. Objective:  Arrived having just removed short stretch compression bandaging. OBSERVATION:     *Left UE:  Tissue quality over medial surfaces of upper arm/elbow is boggy to slightly boggy, slightly pitting; lateral surfaces are slightly boggy; good superficial tissue mobility. Medial side of ventral surface of forearm is densely boggy to boggy, non-pitting with poor to fair superficial tissue mobility; lateral side and dorsal surface is boggy, slightly pitting with fair superficial tissue mobility. Dorsum of hand is boggy to slightly boggy, pitting; fingers are slightly boggy to supple. *Left breast/trunk:  Minimal, slightly boggy lymphedema over inferior surface of breast. Subtle, lymphedema over posterior trunk superior to scapular spine and adjacent to axilla.     MEASUREMENTS:   None taken   TREATMENT:  *Left upper quadrant manual lymph drainage with soft tissue mobilization of dense areas of tissue. Observed improvement in superficial tissue mobility of forearm with reduced forearm and hand volume by end of MLD. *Applied bandage system  *Advised to continue with daily Left UE compression for reduction purposes; begin using Flexitouch Plus device. COMPRESSION:  *Left UE: stockinet; 1/4\" foam insert over dorsum of hand/wrist crease; Cellona wrap; 3 short stretch compression bandages: 1, 4cm, 1, 8cm, 1, 10cm (DEFERRED -- Circaid arm and hand reduction garments)     Goals:  (to be met in 18 visits)  1. Pt will be independent in decongestive exercises. ACHIEVED   2. Pt will be independent in self-manual lymph drainage. 3. Pt will obtain necessary supplies for reduction phase of therapy. ACHIEVED   4. Pt will tolerate Left UE short stretch compression bandaging for 20-23 hours. ACHIEVED   5. Pt/Caregiver will be independent in Left UE short stretch compression bandaging. ACHIEVED     6. Reduce Left UE lymphedema volume by 20-25cm to improve pt's comfort and self-esteem and allow pt to be re-fit for custom-fit garments. 7. Reduce Left UE lymphedema density to slightly boggy to reduce infection risk. 8. Pt will be independent in use of compression garments, self-manual lymph drainage, decongestive exercises and lymphedema precautions for life-long self-management of lymphedema. Plan:   Pt will be on hiatus from therapy as noted in \"Subjective\" above. Next available opportunity for pt to return to on-going therapy will be 9/13.     Charges: 4 Manual Therapy    Total Timed Treatment: 55 min  Total Treatment Time: 60 min         UE Circumferential Measurements (cm) 5/10/2023  LEFT 5/10/2023  RIGHT 5/31/2023  LEFT   7/12/2023  LEFT 7/12/2023  RIGHT   Axilla 37.0 35.0 37.3 37.3 34.8   **12cm above elbow crease 37.2 35.0 36.6 36.8 35.0   10cm above elbow crease 37.0 35.0 36.7 36.5 34.3   5cm above elbow crease 37.5 33.8 36.6 37.0 34.0   Elbow crease 28.5 25.8 27.0 28.1 25.5 **17cm above ulnar styloid   30.9   25.8   29.7   30.7   25.6   15cm above ulnar styloid 30.7 25.6 29.7 30.5 25.4   10cm above ulnar styloid 28.0 23.2 27.1 28.1 23.6   5cm above ulnar styloid 23.0 18.7 21.8 22.9 19.0   Ulnar styloid 17.5 15.2 17.5 18.7 15.4     Mid-palm   18.7   18.1   18.7   19.2   17.7   Across MCPs 18.3 18.2 18.2 18.2 18.0   Thumb P1 6.3 6.1 6.1 6.2 6.1   Index P1 6.7 6.2 6.5 6.5 6.5   P2 5.3 5.1 5.1 5.2 5.0   Middle P1 6.4 5.9 6.4 6.4 5.9   P2 5.3 5.3 5.3 5.1 5.3   Ring P1 5.9 5.2 5.9 5.8 5.3   P2 4.8 4.7 4.8 4.7 4.6   Little P1 5.3 4.8 5.3 5.2 4.7   P2 4.2 4.0 4.1 4.0 3.9     TOTALS   394.5   356.7   386. 4   393.1   355.6            UE Circumferential Measurements (cm) 8/1/2023   LEFT   Axilla 36.2   **12cm above elbow crease 36.0   10cm above elbow crease 36.0   5cm above elbow crease 35.7   Elbow crease 27.1          **17cm above ulnar styloid   29.7   15cm above ulnar styloid 29.3   10cm above ulnar styloid 26.4   5cm above ulnar styloid 22.0   Ulnar styloid 18.0     Mid-palm   18.7   Across MCPs 18.0   Thumb P1 6.0   Index P1 6.3   P2 5.1   Middle P1 6.2   P2 5.2   Ring P1 5.9   P2 4.4   Little P1 5.1   P2 3.8     TOTALS   381.1

## 2023-08-25 ENCOUNTER — SOCIAL WORK SERVICES (OUTPATIENT)
Dept: HEMATOLOGY/ONCOLOGY | Facility: HOSPITAL | Age: 53
End: 2023-08-25

## 2023-08-25 NOTE — PROGRESS NOTES
spoke with patient and completed Intermittent Leave, faxing to [facility] Q#[phone] and sending copy to patient via [address]

## 2023-09-11 ENCOUNTER — OFFICE VISIT (OUTPATIENT)
Dept: PODIATRY CLINIC | Facility: CLINIC | Age: 53
End: 2023-09-11

## 2023-09-11 DIAGNOSIS — L60.0 ONYCHOCRYPTOSIS: Primary | ICD-10-CM

## 2023-09-11 NOTE — PROGRESS NOTES
Sweta Santos is a 52year old female. Patient presents with:  New Patient: Bilateral 3rd nails ingrown. Patient denies any pain. HPI:   Patient presents to the clinic because her third left toenail has a tendency to ingrown she does clean it with peroxide its not bothering her now it has not bothered her for a while she is not complaining of any pain. At today's visit reviewed nurse's history as taken above, allergies medications and medical history as documented below. All changes duly noted  Allergies: B Complex-C [B-Plex], Benzoyl Peroxide, Erythromycin, Dust Mites, and Mold   Current Outpatient Medications   Medication Sig Dispense Refill    mupirocin 2 % External Ointment Apply 1 Application topically 2 (two) times daily. 22 g 1    HYDROcodone-acetaminophen 5-325 MG Oral Tab Take 1-2 tablets by mouth every 6 (six) hours as needed for Pain. 40 tablet 0    docusate sodium 100 MG Oral Cap Take 1 capsule (100 mg total) by mouth 2 (two) times daily. 20 capsule 1    Nutritional Supplements (RADHA NUTRIVIGOR) Oral Powd Pack Take 1 Package by mouth daily. losartan-hydroCHLOROthiazide 50-12.5 MG Oral Tab Take 1 tablet by mouth daily. 30 tablet 5    ANASTROZOLE 1 MG Oral Tab tab TAKE 1 TABLET(1 MG) BY MOUTH DAILY 30 tablet 6    albuterol (PROAIR HFA) 108 (90 Base) MCG/ACT Inhalation Aero Soln Inhale 2 puffs into the lungs every 4 (four) hours as needed for Wheezing or Shortness of Breath (cough). 1 each 1    ondansetron (ZOFRAN) 8 MG tablet Take 1 tablet (8 mg total) by mouth every 8 (eight) hours as needed for Nausea. 30 tablet 3    Cetirizine HCl (ZYRTEC ALLERGY) 10 MG Oral Cap Take 1 capsule by mouth daily. ALPRAZolam 0.25 MG Oral Tab Take 1 tablet (0.25 mg total) by mouth. Beclomethasone Diprop HFA (QVAR REDIHALER) 80 MCG/ACT Inhalation Aerosol, Breath Activated Inhale 2 puffs into the lungs 2 (two) times daily.       Fluticasone Propionate 50 MCG/ACT Nasal Suspension 2 sprays by Nasal route daily. Past Medical History:   Diagnosis Date    Anxiety     Asthma     Breast cancer ([address]     Bronchitis     Exposure to medical diagnostic radiation     2/24/2022    Hypertension     Migraines     Personal history of antineoplastic chemotherapy     last treatment 12/2021    PONV (postoperative nausea and vomiting)     Visual impairment     glasses for reading      Past Surgical History:   Procedure Laterality Date    COLONOSCOPY      INJECT NERV BLCK,AXILLARY NERV      LUMPECTOMY LEFT      OTHER      sinus surgery x2    TONSILLECTOMY        Family History   Problem Relation Age of Onset    Hypertension Father     Breast Cancer Mother 36    Breast Cancer Maternal Grandmother 62    Cancer Paternal Grandmother [de-identified]        stomach     Cancer Brother         skin     Cancer Maternal Uncle         prostate    Cancer Paternal Uncle         prostate      Social History    Socioeconomic History      Marital status: Single    Tobacco Use      Smoking status: Never      Smokeless tobacco: Never    Vaping Use      Vaping Use: Never used    Substance and Sexual Activity      Alcohol use: Yes        Comment: couple times per week      Drug use: Never          REVIEW OF SYSTEMS:   Today reviewed systens as documented below  GENERAL HEALTH: feels well otherwise  SKIN: Refer to exam below  RESPIRATORY: denies shortness of breath with exertion  CARDIOVASCULAR: denies chest pain on exertion  GI: denies abdominal pain and denies heartburn  NEURO: denies headaches    EXAM:   LMP 01/01/2020   GENERAL: well developed, well nourished, in no apparent distress  EXTREMITIES:   1. Integument: The skin on her feet is warm and dry. The lateral nail border of her third toenail on the left foot is incurvated seems to be mild hyperkeratotic impaction but no sign of infection.    2. Vascular: Patient has palpable pulses dorsalis pedis posterior tibial bilaterally are symmetrical and equivocal with good cap return to the pedal digits   3. Neurologic: Patient has pain sensation intact   4. Musculoskeletal: Muscle groups fired 5 out of 5 the patient is ambulatory. ASSESSMENT AND PLAN:   Diagnoses and all orders for this visit:    Onychocryptosis        Plan: I told her that this could be corrected in the office permanently she should return to the clinic if it begins to bother her again otherwise return as needed. The patient indicates understanding of these issues and agrees to the plan.     Alvin Dupont DPM

## 2023-09-13 ENCOUNTER — OFFICE VISIT (OUTPATIENT)
Dept: OCCUPATIONAL MEDICINE | Facility: HOSPITAL | Age: 53
End: 2023-09-13
Attending: INTERNAL MEDICINE
Payer: COMMERCIAL

## 2023-09-13 PROCEDURE — 97140 MANUAL THERAPY 1/> REGIONS: CPT

## 2023-09-18 ENCOUNTER — OFFICE VISIT (OUTPATIENT)
Dept: OCCUPATIONAL MEDICINE | Facility: HOSPITAL | Age: 53
End: 2023-09-18
Attending: INTERNAL MEDICINE
Payer: COMMERCIAL

## 2023-09-18 PROCEDURE — 97140 MANUAL THERAPY 1/> REGIONS: CPT

## 2023-09-19 NOTE — PROGRESS NOTES
iagnosis:   Lymphedema of left arm (I89.0)     Referring Provider: Fidelina Valdez  Date of Evaluation:    5/10/2023    Precautions:  Lymphedema; HTN; h/o CKD Next MD visit:   none scheduled  Date of Surgery: n/a   Insurance Primary/Secondary: [employer] [place] / N/A # Authorized Visits: 17/26            Next MD/Plan Renewal Date: 10/10/2023        Subjective:   *Pt reports daily use of Left Circaid arm reduction garment since last seen; using nighttime garment every night. *Used Flexitouch Plus device only once. Assessment:   Although diligent with use of limb compression for reduction purposes, pt struggling with aggressive UE lymphedema volume. Would benefit from daily use of compression device. Objective:  Pt arrived wearing Circaid reduction garment. OBSERVATION:   *Left UE: Tissue quality over medial surfaces of upper arm/elbow is boggy to slightly boggy, slightly pitting; lateral surfaces are slightly boggy; good superficial tissue mobility. Medial side of ventral surface of forearm is densely boggy to boggy, slightly pitting with poor to fair superficial tissue mobility; lateral side and dorsal surface is boggy, slightly pitting with fair superficial tissue mobility. Dorsum of hand is boggy, pitting; fingers are slightly boggy to supple. *Left breast/trunk:  Minimal, slightly boggy lymphedema over inferior surface of breast. Subtle, lymphedema over posterior trunk superior to scapular spine and adjacent to axilla. MEASUREMENTS:   None taken   TREATMENT:  *Left upper quadrant manual lymph drainage with soft tissue mobilization of dense areas of tissue and extended focus over forearm. Observed improvement in superficial tissue mobility of forearm with reduced forearm volume by end of MLD. *Recommended that pt increase use of Flexitouch Plus device .   *Applied bandage system  COMPRESSION:  *Left UE: stockinet; 1/4\" foam insert over dorsum of hand/wrist crease; Cellona wrap; 3 short stretch compression bandages: 1, 4cm, 1, 8cm, 1, 10cm (DEFERRED -- Circaid arm and hand reduction garments)     Goals:  (to be met in 18 visits)  1. Pt will be independent in decongestive exercises. ACHIEVED   2. Pt will be independent in self-manual lymph drainage. ACHIEVED   3. Pt will obtain necessary supplies for reduction phase of therapy. ACHIEVED   4. Pt will tolerate Left UE short stretch compression bandaging for 20-23 hours. ACHIEVED   5. Pt/Caregiver will be independent in Left UE short stretch compression bandaging. ACHIEVED     6. Reduce Left UE lymphedema volume by 20-25cm to improve pt's comfort and self-esteem and allow pt to be re-fit for custom-fit garments. 7. Reduce Left UE lymphedema density to slightly boggy to reduce infection risk. 8. Pt will be independent in use of compression garments, self-manual lymph drainage, decongestive exercises and lymphedema precautions for life-long self-management of lymphedema. Plan:   Continue current plan.      Charges: 4 Manual Therapy    Total Timed Treatment: 55 min  Total Treatment Time: 60 min         UE Circumferential Measurements (cm) 5/10/2023  LEFT 5/10/2023  RIGHT 5/31/2023  LEFT   7/12/2023  LEFT 7/12/2023  RIGHT   Axilla 37.0 35.0 37.3 37.3 34.8   **12cm above elbow crease 37.2 35.0 36.6 36.8 35.0   10cm above elbow crease 37.0 35.0 36.7 36.5 34.3   5cm above elbow crease 37.5 33.8 36.6 37.0 34.0   Elbow crease 28.5 25.8 27.0 28.1 25.5          **17cm above ulnar styloid   30.9   25.8   29.7   30.7   25.6   15cm above ulnar styloid 30.7 25.6 29.7 30.5 25.4   10cm above ulnar styloid 28.0 23.2 27.1 28.1 23.6   5cm above ulnar styloid 23.0 18.7 21.8 22.9 19.0   Ulnar styloid 17.5 15.2 17.5 18.7 15.4     Mid-palm   18.7   18.1   18.7   19.2   17.7   Across MCPs 18.3 18.2 18.2 18.2 18.0   Thumb P1 6.3 6.1 6.1 6.2 6.1   Index P1 6.7 6.2 6.5 6.5 6.5   P2 5.3 5.1 5.1 5.2 5.0   Middle P1 6.4 5.9 6.4 6.4 5.9   P2 5.3 5.3 5.3 5.1 5.3   Ring P1 5.9 5.2 5.9 5.8 5.3   P2 4.8 4.7 4.8 4.7 4.6   Little P1 5.3 4.8 5.3 5.2 4.7   P2 4.2 4.0 4.1 4.0 3.9     TOTALS   394.5   356.7   386. 4   393.1   355.6            UE Circumferential Measurements (cm) 8/1/2023   LEFT 9/13/2023   LEFT 9/13/2023   RIGHT   Axilla 36.2 37.0 35.0   **12cm above elbow crease 36.0 36.4 36.3   10cm above elbow crease 36.0 36.4 35.3   5cm above elbow crease 35.7 36.4 34.5   Elbow crease 27.1 28.0 26.3          **17cm above ulnar styloid   29.7   29.5   26.0   15cm above ulnar styloid 29.3 29.3 26.0   10cm above ulnar styloid 26.4 27.2 23.7   5cm above ulnar styloid 22.0 22.6 19.1   Ulnar styloid 18.0 18.7 15.5     Mid-palm   18.7   19.0   17.9   Across MCPs 18.0 18.3 18.2   Thumb P1 6.0 6.1 6.1   Index P1 6.3 6.5 6.1   P2 5.1 5.0 4.9   Middle P1 6.2 6.4 5.8   P2 5.2 5.3 5.0   Ring P1 5.9 5.9 5.2   P2 4.4 4.3 4.4   Little P1 5.1 5.0 4.8   P2 3.8 3.9 4.0     TOTALS   381.1   387. 2   360.1

## 2023-09-20 ENCOUNTER — APPOINTMENT (OUTPATIENT)
Dept: OCCUPATIONAL MEDICINE | Facility: HOSPITAL | Age: 53
End: 2023-09-20
Payer: COMMERCIAL

## 2023-09-23 ENCOUNTER — PATIENT MESSAGE (OUTPATIENT)
Dept: INTERNAL MEDICINE CLINIC | Facility: CLINIC | Age: 53
End: 2023-09-23

## 2023-09-25 ENCOUNTER — OFFICE VISIT (OUTPATIENT)
Dept: OCCUPATIONAL MEDICINE | Facility: HOSPITAL | Age: 53
End: 2023-09-25
Attending: INTERNAL MEDICINE
Payer: COMMERCIAL

## 2023-09-25 PROCEDURE — 97140 MANUAL THERAPY 1/> REGIONS: CPT

## 2023-09-25 NOTE — PROGRESS NOTES
iagnosis:   Lymphedema of left arm (I89.0)     Referring Provider: Arsh Mckeon  Date of Evaluation:    5/10/2023    Precautions:  Lymphedema; HTN; h/o CKD Next MD visit:   none scheduled  Date of Surgery: n/a   Insurance Primary/Secondary: [employer] [place] P[place] / N/A # Authorized Visits: 18/26            Next MD/Plan Renewal Date: 10/10/2023        Subjective:   *Pt reports daily use of Left Circaid arm reduction garment or short stretch compression bandaging since last session. Sometimes needs to remove reduction garment at night if she can't sleep. *Used Flexitouch Plus device a couple of times since last session; used last evening. Assessment:   Pt demonstrating good progress towards reduction of Left UE lymphedema with improving tissue quality throughout limb. Good follow through with daily use of compression for reduction purposes. Objective:  Pt arrived wearing Circaid reduction garment. OBSERVATION:   *Reduction in lymphedema density over entire Left UE.   *Left UE:  Tissue quality over upper arm and elbow is with good superficial tissue mobility. Medial side of ventral surface of forearm is to boggy, slightly pitting with poor to fair superficial tissue mobility; lateral side and dorsal surface is boggy, slightly pitting with fair superficial tissue mobility; distal surfaces more involved than proximal. Dorsum of hand is boggy to slightly boggy, pitting; fingers are slightly boggy to supple. *Left breast/trunk:  Minimal, slightly boggy lymphedema over inferior surface of breast. Subtle, lymphedema over posterior trunk superior to scapular spine and adjacent to axilla. MEASUREMENTS:   *Left UE circumferential volume has decreased by 7.4cm since 9/13 for an overall reduction of 14.7cm.  Left UE is now 19.7cm larger than dominant Right with a reduction of 18.1cm in limb differential.  TREATMENT:  *Volume re-measurement   *Left upper quadrant manual lymph drainage with soft tissue mobilization of dense areas of tissue and extended focus over forearm. Observed improvement in superficial tissue mobility of forearm with reduced forearm volume by end of MLD. *Applied bandage system, sans 1/4\" foam insert over dorsum of hand/wrist crease -- pt insisting that she will be \"okay\" without insert  COMPRESSION:  *Left UE: stockinet; (DEFERRED -- 1/4\" foam insert over dorsum of hand/wrist crease); Cellona wrap; 3 short stretch compression bandages: 1, 4cm, 1, 8cm, 1, 10cm (DEFERRED -- Circaid arm and hand reduction garments)     Goals:  (to be met in 18 visits)  1. Pt will be independent in decongestive exercises. ACHIEVED   2. Pt will be independent in self-manual lymph drainage. ACHIEVED   3. Pt will obtain necessary supplies for reduction phase of therapy. ACHIEVED   4. Pt will tolerate Left UE short stretch compression bandaging for 20-23 hours. ACHIEVED   5. Pt/Caregiver will be independent in Left UE short stretch compression bandaging. ACHIEVED     6. Reduce Left UE lymphedema volume by 20-25cm to improve pt's comfort and self-esteem and allow pt to be re-fit for custom-fit garments. 7. Reduce Left UE lymphedema density to slightly boggy to reduce infection risk. 8. Pt will be independent in use of compression garments, self-manual lymph drainage, decongestive exercises and lymphedema precautions for life-long self-management of lymphedema. Plan:   Continue current plan.      Charges: 4 Manual Therapy    Total Timed Treatment: 55 min  Total Treatment Time: 60 min         UE Circumferential Measurements (cm) 5/10/2023  LEFT 5/10/2023  RIGHT 5/31/2023  LEFT   7/12/2023  LEFT 7/12/2023  RIGHT   Axilla 37.0 35.0 37.3 37.3 34.8   **12cm above elbow crease 37.2 35.0 36.6 36.8 35.0   10cm above elbow crease 37.0 35.0 36.7 36.5 34.3   5cm above elbow crease 37.5 33.8 36.6 37.0 34.0   Elbow crease 28.5 25.8 27.0 28.1 25.5          **17cm above ulnar styloid   30.9   25.8   29.7   30.7   25.6   15cm above ulnar styloid 30.7 25.6 29.7 30.5 25.4   10cm above ulnar styloid 28.0 23.2 27.1 28.1 23.6   5cm above ulnar styloid 23.0 18.7 21.8 22.9 19.0   Ulnar styloid 17.5 15.2 17.5 18.7 15.4     Mid-palm   18.7   18.1   18.7   19.2   17.7   Across MCPs 18.3 18.2 18.2 18.2 18.0   Thumb P1 6.3 6.1 6.1 6.2 6.1   Index P1 6.7 6.2 6.5 6.5 6.5   P2 5.3 5.1 5.1 5.2 5.0   Middle P1 6.4 5.9 6.4 6.4 5.9   P2 5.3 5.3 5.3 5.1 5.3   Ring P1 5.9 5.2 5.9 5.8 5.3   P2 4.8 4.7 4.8 4.7 4.6   Little P1 5.3 4.8 5.3 5.2 4.7   P2 4.2 4.0 4.1 4.0 3.9     TOTALS   394.5   356.7   386. 4   393.1   355.6            UE Circumferential Measurements (cm) 8/1/2023   LEFT 9/13/2023   LEFT 9/13/2023   RIGHT 9/25/2023  LEFT  9/25/2023   RIGHT   Axilla 36.2 37.0 35.0 36.3 35.9   **12cm above elbow crease 36.0 36.4 36.3 36.0 36.0   10cm above elbow crease 36.0 36.4 35.3 36.2 35.3   5cm above elbow crease 35.7 36.4 34.5 35.8 34.5   Elbow crease 27.1 28.0 26.3 27.5 26.3          **17cm above ulnar styloid   29.7   29.5   26.0   28.7   26.0   15cm above ulnar styloid 29.3 29.3 26.0 28.4 26.0   10cm above ulnar styloid 26.4 27.2 23.7 25.8 23.7   5cm above ulnar styloid 22.0 22.6 19.1 21.8 18.9   Ulnar styloid 18.0 18.7 15.5 17.5 15.1     Mid-palm   18.7   19.0   17.9   18.7   17.9   Across MCPs 18.0 18.3 18.2 18.4 18.2   Thumb P1 6.0 6.1 6.1 6.0 6.1   Index P1 6.3 6.5 6.1 6.5 6.1   P2 5.1 5.0 4.9 5.0 4.9   Middle P1 6.2 6.4 5.8 6.4 5.8   P2 5.2 5.3 5.0 5.3 5.0   Ring P1 5.9 5.9 5.2 6.1 5.2   P2 4.4 4.3 4.4 4.3 4.4   Little P1 5.1 5.0 4.8 5.3 4.8   P2 3.8 3.9 4.0 3.8 4.0     TOTALS   381.1   387. 2   360.1   379.8   360.1

## 2023-09-27 ENCOUNTER — APPOINTMENT (OUTPATIENT)
Dept: OCCUPATIONAL MEDICINE | Facility: HOSPITAL | Age: 53
End: 2023-09-27
Payer: COMMERCIAL

## 2023-10-04 ENCOUNTER — OFFICE VISIT (OUTPATIENT)
Dept: OCCUPATIONAL MEDICINE | Facility: HOSPITAL | Age: 53
End: 2023-10-04
Attending: INTERNAL MEDICINE
Payer: COMMERCIAL

## 2023-10-04 PROCEDURE — 97140 MANUAL THERAPY 1/> REGIONS: CPT

## 2023-10-04 NOTE — PROGRESS NOTES
iagnosis:   Lymphedema of left arm (I89.0)     Referring Provider: Krista Martinez  Date of Evaluation:    5/10/2023    Precautions:  Lymphedema; HTN; h/o CKD Next MD visit:   none scheduled  Date of Surgery: n/a   Insurance Primary/Secondary: [employer] [place] PPO / N/A # Authorized Visits: 19/26            Next MD/Plan Renewal Date: 10/10/2023        Subjective:   *Pt reports daily use of Left Circaid arm reduction garment or short stretch compression bandaging since last session. Using either reduction garment or her nighttime garment over night. *Gets home from work at [address] during the work week so only has time to use Flexitouch Plus device on the weekends, which she has been doing. *Reports her hand is more swollen today. Sharing that this week she has had a lot of reports to turn and extended time typing always makes her hand swell. Assessment:   Pt with diligent use of Left UE compression for reduction purposes. Making progress towards improvement of Left forearm tissue quality. Although stating her hand volume is higher today, from therapist's observation volume appears stable as compared to last seen. Objective:  Pt arrived wearing Circaid reduction garment. OBSERVATION:   *Improved tissue mobility over Left forearm. *Left UE:  Tissue quality over upper arm and elbow is with good superficial tissue mobility. Medial side of ventral surface of forearm is to boggy, slightly pitting with fair to good superficial tissue mobility; lateral side and dorsal surface is boggy to slightly boggy, slightly pitting with fair to good superficial tissue mobility; distal surfaces more involved than proximal. Dorsum of hand is boggy to slightly boggy, pitting; fingers are slightly boggy to supple. *Left breast/trunk:  Minimal, slightly boggy lymphedema over inferior surface of breast. Subtle, lymphedema over posterior trunk superior to scapular spine and adjacent to axilla.     MEASUREMENTS:   None taken   TREATMENT:  *Left upper quadrant manual lymph drainage with soft tissue mobilization of dense areas of tissue and extended focus over forearm. Observed improvement in superficial tissue mobility of forearm with reduced forearm volume by end of MLD. *Applied bandage system   COMPRESSION:  *Left UE: stockinet; 1/4\" foam insert over dorsum of hand/wrist crease; Cellona wrap; 3 short stretch compression bandages: 1, 4cm, 1, 8cm, 1, 10cm (DEFERRED -- Circaid arm and hand reduction garments)     Goals:  (to be met in 18 visits)  1. Pt will be independent in decongestive exercises. ACHIEVED   2. Pt will be independent in self-manual lymph drainage. ACHIEVED   3. Pt will obtain necessary supplies for reduction phase of therapy. ACHIEVED   4. Pt will tolerate Left UE short stretch compression bandaging for 20-23 hours. ACHIEVED   5. Pt/Caregiver will be independent in Left UE short stretch compression bandaging. ACHIEVED     6. Reduce Left UE lymphedema volume by 20-25cm to improve pt's comfort and self-esteem and allow pt to be re-fit for custom-fit garments. 7. Reduce Left UE lymphedema density to slightly boggy to reduce infection risk. 8. Pt will be independent in use of compression garments, self-manual lymph drainage, decongestive exercises and lymphedema precautions for life-long self-management of lymphedema. Plan:   Continue current plan.      Charges: 4 Manual Therapy    Total Timed Treatment: 55 min  Total Treatment Time: 60 min         UE Circumferential Measurements (cm) 5/10/2023  LEFT 5/10/2023  RIGHT 5/31/2023  LEFT   7/12/2023  LEFT 7/12/2023  RIGHT   Axilla 37.0 35.0 37.3 37.3 34.8   **12cm above elbow crease 37.2 35.0 36.6 36.8 35.0   10cm above elbow crease 37.0 35.0 36.7 36.5 34.3   5cm above elbow crease 37.5 33.8 36.6 37.0 34.0   Elbow crease 28.5 25.8 27.0 28.1 25.5          **17cm above ulnar styloid   30.9   25.8   29.7   30.7   25.6   15cm above ulnar styloid 30.7 25.6 29.7 30.5 25.4   10cm above ulnar styloid 28.0 23.2 27.1 28.1 23.6   5cm above ulnar styloid 23.0 18.7 21.8 22.9 19.0   Ulnar styloid 17.5 15.2 17.5 18.7 15.4     Mid-palm   18.7   18.1   18.7   19.2   17.7   Across MCPs 18.3 18.2 18.2 18.2 18.0   Thumb P1 6.3 6.1 6.1 6.2 6.1   Index P1 6.7 6.2 6.5 6.5 6.5   P2 5.3 5.1 5.1 5.2 5.0   Middle P1 6.4 5.9 6.4 6.4 5.9   P2 5.3 5.3 5.3 5.1 5.3   Ring P1 5.9 5.2 5.9 5.8 5.3   P2 4.8 4.7 4.8 4.7 4.6   Little P1 5.3 4.8 5.3 5.2 4.7   P2 4.2 4.0 4.1 4.0 3.9     TOTALS   394.5   356.7   386. 4   393.1   355.6            UE Circumferential Measurements (cm) 8/1/2023   LEFT 9/13/2023   LEFT 9/13/2023   RIGHT 9/25/2023  LEFT  9/25/2023   RIGHT   Axilla 36.2 37.0 35.0 36.3 35.9   **12cm above elbow crease 36.0 36.4 36.3 36.0 36.0   10cm above elbow crease 36.0 36.4 35.3 36.2 35.3   5cm above elbow crease 35.7 36.4 34.5 35.8 34.5   Elbow crease 27.1 28.0 26.3 27.5 26.3          **17cm above ulnar styloid   29.7   29.5   26.0   28.7   26.0   15cm above ulnar styloid 29.3 29.3 26.0 28.4 26.0   10cm above ulnar styloid 26.4 27.2 23.7 25.8 23.7   5cm above ulnar styloid 22.0 22.6 19.1 21.8 18.9   Ulnar styloid 18.0 18.7 15.5 17.5 15.1     Mid-palm   18.7   19.0   17.9   18.7   17.9   Across MCPs 18.0 18.3 18.2 18.4 18.2   Thumb P1 6.0 6.1 6.1 6.0 6.1   Index P1 6.3 6.5 6.1 6.5 6.1   P2 5.1 5.0 4.9 5.0 4.9   Middle P1 6.2 6.4 5.8 6.4 5.8   P2 5.2 5.3 5.0 5.3 5.0   Ring P1 5.9 5.9 5.2 6.1 5.2   P2 4.4 4.3 4.4 4.3 4.4   Little P1 5.1 5.0 4.8 5.3 4.8   P2 3.8 3.9 4.0 3.8 4.0     TOTALS   381.1   387. 2   360.1   379.8   360.1

## 2023-10-09 ENCOUNTER — APPOINTMENT (OUTPATIENT)
Dept: OCCUPATIONAL MEDICINE | Facility: HOSPITAL | Age: 53
End: 2023-10-09
Payer: COMMERCIAL

## 2023-10-11 ENCOUNTER — OFFICE VISIT (OUTPATIENT)
Dept: SURGERY | Facility: CLINIC | Age: 53
End: 2023-10-11
Payer: COMMERCIAL

## 2023-10-11 ENCOUNTER — TELEPHONE (OUTPATIENT)
Dept: INTERNAL MEDICINE CLINIC | Facility: CLINIC | Age: 53
End: 2023-10-11

## 2023-10-11 ENCOUNTER — OFFICE VISIT (OUTPATIENT)
Dept: OCCUPATIONAL MEDICINE | Facility: HOSPITAL | Age: 53
End: 2023-10-11
Attending: INTERNAL MEDICINE
Payer: COMMERCIAL

## 2023-10-11 DIAGNOSIS — I10 PRIMARY HYPERTENSION: Primary | ICD-10-CM

## 2023-10-11 DIAGNOSIS — N64.89 BREAST ASYMMETRY: Primary | ICD-10-CM

## 2023-10-11 DIAGNOSIS — Z00.00 ROUTINE GENERAL MEDICAL EXAMINATION AT A HEALTH CARE FACILITY: ICD-10-CM

## 2023-10-11 PROCEDURE — 97140 MANUAL THERAPY 1/> REGIONS: CPT

## 2023-10-11 NOTE — TELEPHONE ENCOUNTER
Future Appointments   Date Time Provider Barney Perdomo   1/18/2024  9:00 AM Laura Duong MD [facility]     Patient is scheduled for Annual Physical.  Please place orders with [facility]. Patient aware to fast.  No call back required. Patient informed that labs need to be completed no sooner than 2 weeks prior to the appointment.

## 2023-10-11 NOTE — PROGRESS NOTES
Danielle Naranjo is a 52year old female who presents today for a follow-up after right breast balancing reduction on 6/13/2023. She denies fever and chills. She denies nausea, vomiting, diarrhea or constipation. Her pain is controlled. Physical Exam     Breast: The right breast incision is clean, dry, and intact. There is no evidence of hematoma or seroma. No evidence of wound dehiscence or necrosis. Nipple is viable. Acceptable shape and symmetry. There were no vitals filed for this visit. Assessment and Plan     Danielle Naranjo is doing well s/p right breast balancing reduction on 6/13/2023. She is here today for follow-up scar check. Patient is satisfied with outcome. She will follow-up as needed. She was encouraged to contact the office for any questions or concerns. Questions were answered. Patient understands.

## 2023-10-12 NOTE — PROGRESS NOTES
iagnosis:   Lymphedema of left arm (I89.0)     Referring Provider: Andre Carr  Date of Evaluation:    5/10/2023    Precautions:  Lymphedema; HTN; h/o CKD Next MD visit:   none scheduled  Date of Surgery: n/a   Insurance Primary/Secondary: [employer] [place] P[place] / N/A # Authorized Visits: 20/26            Next MD/Plan Renewal Date: 10/10/2023         Progress Summary  Pt has attended 20/26 visits in Occupational Therapy. Subjective:   Pt reports she traveled by air this past weekend. She wore a custom-fit compression sleeve for air travel. At her destination, it was cold so she continued to wear her compression sleeve for the weekend as her coat wouldn't fit over the reduction garment/bandages. Did not travel with Flexitouch Plus device, but did complete self-manual lymph drainage. Today has had Right UE compression off for 30-60 min; removed as she was making apple crisp and did not want to get her compression dirty. For usual Flexitouch device use, gets home from work at 7pm during the work week so only has time to use Flexitouch Plus device on the weekends. Assessment:   Pt returns to therapy today after last being seen on 10/4. She returns with a rise in her volume as compared to measurements on 9/25. Rise in volume brings her back to level measured when she returned to therapy on 9/13. However, there has been some overall reduction in volume. Progress negatively impacted by recent deferment of compression for reduction purposes, and possibly impacted by air travel and not wearing compression this afternoon. Recommend continued therapy to maximize pt's volume loss and improvement in tissue quality prior to being measured for custom-fit garments. Objective:  Pt attending Occupational Therapy for complete decongestive therapy of Left upper quadrant. EDEMA/TISSUE QUALITY:   *Left UE: While limb volume had reduced by a total of 14.7cm by 9/25.  Today, pt measuring with a rise in limb volume bringing it back to level measured on pt's return to therapy on 9/13. Overall, there has been a reduction of 7.5cm in limb volume since initial eval. Left UE is 26.2cm larger than dominant Right, with Left to Right limb differential decreased by an overall 11.6cm. *Left UE:  Tissue quality over upper arm and elbow is with good superficial tissue mobility. Medial side of ventral surface of forearm is to boggy, slightly pitting with fair to good superficial tissue mobility; lateral side and dorsal surface is boggy to slightly boggy, slightly pitting with fair to good superficial tissue mobility; distal surfaces more involved than proximal. Dorsum of hand is boggy to slightly boggy, pitting; fingers are slightly boggy to supple. *Left breast/trunk:  Minimal, slightly boggy lymphedema over inferior surface of breast. Subtle, lymphedema over posterior trunk superior to scapular spine and adjacent to axilla. TREATMENT:  *Pt arrived without compression on.   *Re-assessment of status   *Left upper quadrant manual lymph drainage with soft tissue mobilization of dense areas of tissue and extended focus over forearm. Observed improvement in superficial tissue mobility of forearm by end of MLD.   *Discussed future appt for measuring for custom-fit garment. Pt considering meeting with garment specialist on 10/17. Advised pt that with rise in volume measured today that her volume may not reduce to a lower level by then and the intent of custom-fit garments is to measure limb when it is at its smallest. [place] agreement for pt to be measured on 10/31. *Applied bandage system   COMPRESSION:  *Left UE: stockinet; 1/4\" foam insert over dorsum of hand/wrist crease; Cellona wrap; 3 short stretch compression bandages: 1, 4cm, 1, 8cm, 1, 10cm (DEFERRED -- Circaid arm and hand reduction garments)     Goals:  (to be met in 18 visits)  1. Pt will be independent in decongestive exercises. ACHIEVED   2.  Pt will be independent in self-manual lymph drainage. ACHIEVED   3. Pt will obtain necessary supplies for reduction phase of therapy. ACHIEVED   4. Pt will tolerate Left UE short stretch compression bandaging for 20-23 hours. ACHIEVED   5. Pt/Caregiver will be independent in Left UE short stretch compression bandaging. ACHIEVED     6. Reduce Left UE lymphedema volume by 20-25cm to improve pt's comfort and self-esteem and allow pt to be re-fit for custom-fit garments. 7. Reduce Left UE lymphedema density to slightly boggy to reduce infection risk. 8. Pt will be independent in use of compression garments, self-manual lymph drainage, decongestive exercises and lymphedema precautions for life-long self-management of lymphedema. Plan: Continue skilled Occupational Therapy 2 x/week or a total of 6 more visits. Treatment will include: Left upper quadrant manual lymph drainage with soft tissue mobilization of dense areas of tissue; assist with garment prescription. Patient/Family/Caregiver was advised of these findings, precautions, and treatment options and has agreed to actively participate in planning and for this course of care. Thank you for your referral. If you have any questions, please contact me at Dept: [phone]. Sincerely,  Electronically signed by therapist: Benita Arriaga. GWEN Cloud/L, [place]       Plan:   Continue current plan.      Charges: 4 Manual Therapy    Total Timed Treatment: 55 min  Total Treatment Time: 60 min         UE Circumferential Measurements (cm) 5/10/2023  LEFT 5/10/2023  RIGHT 5/31/2023  LEFT   7/12/2023  LEFT 7/12/2023  RIGHT   Axilla 37.0 35.0 37.3 37.3 34.8   **12cm above elbow crease 37.2 35.0 36.6 36.8 35.0   10cm above elbow crease 37.0 35.0 36.7 36.5 34.3   5cm above elbow crease 37.5 33.8 36.6 37.0 34.0   Elbow crease 28.5 25.8 27.0 28.1 25.5          **17cm above ulnar styloid   30.9   25.8   29.7   30.7   25.6   15cm above ulnar styloid 30.7 25.6 29.7 30.5 25.4   10cm above ulnar styloid 28.0 23.2 27.1 28.1 23.6   5cm above ulnar styloid 23.0 18.7 21.8 22.9 19.0   Ulnar styloid 17.5 15.2 17.5 18.7 15.4     Mid-palm   18.7   18.1   18.7   19.2   17.7   Across MCPs 18.3 18.2 18.2 18.2 18.0   Thumb P1 6.3 6.1 6.1 6.2 6.1   Index P1 6.7 6.2 6.5 6.5 6.5   P2 5.3 5.1 5.1 5.2 5.0   Middle P1 6.4 5.9 6.4 6.4 5.9   P2 5.3 5.3 5.3 5.1 5.3   Ring P1 5.9 5.2 5.9 5.8 5.3   P2 4.8 4.7 4.8 4.7 4.6   Little P1 5.3 4.8 5.3 5.2 4.7   P2 4.2 4.0 4.1 4.0 3.9     TOTALS   394.5   356.7   386. 4   393.1   355.6            UE Circumferential Measurements (cm) 8/1/2023   LEFT 9/13/2023   LEFT 9/13/2023   RIGHT 9/25/2023  LEFT  9/25/2023   RIGHT   Axilla 36.2 37.0 35.0 36.3 35.9   **12cm above elbow crease 36.0 36.4 36.3 36.0 36.0   10cm above elbow crease 36.0 36.4 35.3 36.2 35.3   5cm above elbow crease 35.7 36.4 34.5 35.8 34.5   Elbow crease 27.1 28.0 26.3 27.5 26.3          **17cm above ulnar styloid   29.7   29.5   26.0   28.7   26.0   15cm above ulnar styloid 29.3 29.3 26.0 28.4 26.0   10cm above ulnar styloid 26.4 27.2 23.7 25.8 23.7   5cm above ulnar styloid 22.0 22.6 19.1 21.8 18.9   Ulnar styloid 18.0 18.7 15.5 17.5 15.1     Mid-palm   18.7   19.0   17.9   18.7   17.9   Across MCPs 18.0 18.3 18.2 18.4 18.2   Thumb P1 6.0 6.1 6.1 6.0 6.1   Index P1 6.3 6.5 6.1 6.5 6.1   P2 5.1 5.0 4.9 5.0 4.9   Middle P1 6.2 6.4 5.8 6.4 5.8   P2 5.2 5.3 5.0 5.3 5.0   Ring P1 5.9 5.9 5.2 6.1 5.2   P2 4.4 4.3 4.4 4.3 4.4   Little P1 5.1 5.0 4.8 5.3 4.8   P2 3.8 3.9 4.0 3.8 4.0     TOTALS   381.1   387. 2   360.1   379.8   360.1          UE Circumferential Measurements (cm) 10/11/2023    LEFT 10/11/2023   RIGHT   Axilla 36.7 35.9   **12cm above elbow crease 36.8 36.0   10cm above elbow crease 36.2 35.6   5cm above elbow crease 36.3 34.5   Elbow crease 27.5 26.2          **17cm above ulnar styloid   29.3   26.0   15cm above ulnar styloid 29.2 26.0   10cm above ulnar styloid 26.7 23.7   5cm above ulnar styloid 22.7 19.2   Ulnar styloid 18.2 15.3 Mid-palm   19.2   17.9   Across MCPs 18.6 18.2   Thumb P1 6.2 6.1   Index P1 6.7 6.1   P2 5.2 4.9   Middle P1 6.5 5.8   P2 5.3 5.0   Ring P1 6.1 5.2   P2 4.5 4.4   Little P1 5.2 4.8   P2 3.9 4.0     TOTALS   387.0   360.8

## 2023-10-16 ENCOUNTER — OFFICE VISIT (OUTPATIENT)
Dept: OCCUPATIONAL MEDICINE | Facility: HOSPITAL | Age: 53
End: 2023-10-16
Attending: INTERNAL MEDICINE
Payer: COMMERCIAL

## 2023-10-16 PROCEDURE — 97140 MANUAL THERAPY 1/> REGIONS: CPT

## 2023-10-17 NOTE — PROGRESS NOTES
iagnosis:   Lymphedema of left arm (I89.0)     Referring Provider: Brandi Helton  Date of Evaluation:    5/10/2023    Precautions:  Lymphedema; HTN; h/o CKD Next MD visit:   none scheduled  Date of Surgery: n/a   Insurance Primary/Secondary: [employer] [place] P[place] / N/A # Authorized Visits: 21/26            Next MD/Plan Renewal Date: 10/10/2023        Subjective:   *Pt reports she feels her Left UE volume might be a little higher today. Admits to eating higher sodium food over the weekend. *Did not use Flexitouch Plus device over the weekend. Assessment:   Pt continuing to follow through with use of Left UE compression for reduction purposes, h/e would benefit from more consistent use of Flexitouch Plus device. Will plan to re-measure limb volume at next session. Objective:    OBSERVATION:   *No readily observable/palpable rise in Left UE volume since last session. *Left UE:  Tissue quality over upper arm and elbow is with good superficial tissue mobility. Medial side of ventral surface of forearm is to boggy, slightly pitting with fair to good superficial tissue mobility; lateral side and dorsal surface is boggy to slightly boggy, slightly pitting with fair to good superficial tissue mobility; distal surfaces more involved than proximal. Dorsum of hand is boggy to slightly boggy, pitting; fingers are slightly boggy to supple. *Left breast/trunk:  Minimal, slightly boggy lymphedema over inferior surface of breast. Subtle, lymphedema over posterior trunk superior to scapular spine and adjacent to axilla. MEASUREMENTS:   None taken   TREATMENT:  *Left upper quadrant manual lymph drainage with soft tissue mobilization of dense areas of tissue and extended focus over forearm. Observed reduction in volume with improvement in superficial tissue mobility of forearm by end of MLD session.    *Applied bandage system   COMPRESSION:  *Left UE: stockinet; 1/4\" foam insert over dorsum of hand/wrist crease; Cellona wrap; 3 short stretch compression bandages: 1, 4cm, 1, 8cm, 1, 10cm (DEFERRED -- Circaid arm and hand reduction garments)     Goals:  (to be met in 18 visits)  1. Pt will be independent in decongestive exercises. ACHIEVED   2. Pt will be independent in self-manual lymph drainage. ACHIEVED   3. Pt will obtain necessary supplies for reduction phase of therapy. ACHIEVED   4. Pt will tolerate Left UE short stretch compression bandaging for 20-23 hours. ACHIEVED   5. Pt/Caregiver will be independent in Left UE short stretch compression bandaging. ACHIEVED     6. Reduce Left UE lymphedema volume by 20-25cm to improve pt's comfort and self-esteem and allow pt to be re-fit for custom-fit garments. 7. Reduce Left UE lymphedema density to slightly boggy to reduce infection risk. 8. Pt will be independent in use of compression garments, self-manual lymph drainage, decongestive exercises and lymphedema precautions for life-long self-management of lymphedema. Plan:   Continue current plan.      Charges: 4 Manual Therapy    Total Timed Treatment: 55 min  Total Treatment Time: 60 min         UE Circumferential Measurements (cm) 5/10/2023  LEFT 5/10/2023  RIGHT 5/31/2023  LEFT   7/12/2023  LEFT 7/12/2023  RIGHT   Axilla 37.0 35.0 37.3 37.3 34.8   **12cm above elbow crease 37.2 35.0 36.6 36.8 35.0   10cm above elbow crease 37.0 35.0 36.7 36.5 34.3   5cm above elbow crease 37.5 33.8 36.6 37.0 34.0   Elbow crease 28.5 25.8 27.0 28.1 25.5          **17cm above ulnar styloid   30.9   25.8   29.7   30.7   25.6   15cm above ulnar styloid 30.7 25.6 29.7 30.5 25.4   10cm above ulnar styloid 28.0 23.2 27.1 28.1 23.6   5cm above ulnar styloid 23.0 18.7 21.8 22.9 19.0   Ulnar styloid 17.5 15.2 17.5 18.7 15.4     Mid-palm   18.7   18.1   18.7   19.2   17.7   Across MCPs 18.3 18.2 18.2 18.2 18.0   Thumb P1 6.3 6.1 6.1 6.2 6.1   Index P1 6.7 6.2 6.5 6.5 6.5   P2 5.3 5.1 5.1 5.2 5.0   Middle P1 6.4 5.9 6.4 6.4 5.9   P2 5.3 5.3 5.3 5.1 5.3   Ring P1 5.9 5.2 5.9 5.8 5.3   P2 4.8 4.7 4.8 4.7 4.6   Little P1 5.3 4.8 5.3 5.2 4.7   P2 4.2 4.0 4.1 4.0 3.9     TOTALS   394.5   356.7   386. 4   393.1   355.6            UE Circumferential Measurements (cm) 8/1/2023   LEFT 9/13/2023   LEFT 9/13/2023   RIGHT 9/25/2023  LEFT  9/25/2023   RIGHT   Axilla 36.2 37.0 35.0 36.3 35.9   **12cm above elbow crease 36.0 36.4 36.3 36.0 36.0   10cm above elbow crease 36.0 36.4 35.3 36.2 35.3   5cm above elbow crease 35.7 36.4 34.5 35.8 34.5   Elbow crease 27.1 28.0 26.3 27.5 26.3          **17cm above ulnar styloid   29.7   29.5   26.0   28.7   26.0   15cm above ulnar styloid 29.3 29.3 26.0 28.4 26.0   10cm above ulnar styloid 26.4 27.2 23.7 25.8 23.7   5cm above ulnar styloid 22.0 22.6 19.1 21.8 18.9   Ulnar styloid 18.0 18.7 15.5 17.5 15.1     Mid-palm   18.7   19.0   17.9   18.7   17.9   Across MCPs 18.0 18.3 18.2 18.4 18.2   Thumb P1 6.0 6.1 6.1 6.0 6.1   Index P1 6.3 6.5 6.1 6.5 6.1   P2 5.1 5.0 4.9 5.0 4.9   Middle P1 6.2 6.4 5.8 6.4 5.8   P2 5.2 5.3 5.0 5.3 5.0   Ring P1 5.9 5.9 5.2 6.1 5.2   P2 4.4 4.3 4.4 4.3 4.4   Little P1 5.1 5.0 4.8 5.3 4.8   P2 3.8 3.9 4.0 3.8 4.0     TOTALS   381.1   387. 2   360.1   379.8   360.1          UE Circumferential Measurements (cm) 10/11/2023    LEFT 10/11/2023   RIGHT   Axilla 36.7 35.9   **12cm above elbow crease 36.8 36.0   10cm above elbow crease 36.2 35.6   5cm above elbow crease 36.3 34.5   Elbow crease 27.5 26.2          **17cm above ulnar styloid   29.3   26.0   15cm above ulnar styloid 29.2 26.0   10cm above ulnar styloid 26.7 23.7   5cm above ulnar styloid 22.7 19.2   Ulnar styloid 18.2 15.3     Mid-palm   19.2   17.9   Across MCPs 18.6 18.2   Thumb P1 6.2 6.1   Index P1 6.7 6.1   P2 5.2 4.9   Middle P1 6.5 5.8   P2 5.3 5.0   Ring P1 6.1 5.2   P2 4.5 4.4   Little P1 5.2 4.8   P2 3.9 4.0     TOTALS   387.0   360.8

## 2023-10-18 ENCOUNTER — OFFICE VISIT (OUTPATIENT)
Dept: OCCUPATIONAL MEDICINE | Facility: HOSPITAL | Age: 53
End: 2023-10-18
Attending: INTERNAL MEDICINE
Payer: COMMERCIAL

## 2023-10-18 PROCEDURE — 97140 MANUAL THERAPY 1/> REGIONS: CPT

## 2023-10-19 NOTE — PROGRESS NOTES
iagnosis:   Lymphedema of left arm (I89.0)     Referring Provider: Verena Montes  Date of Evaluation:    5/10/2023    Precautions:  Lymphedema; HTN; h/o CKD Next MD visit:   none scheduled  Date of Surgery: n/a   Insurance Primary/Secondary: [employer] [place] PPO / N/A # Authorized Visits: 22/26            Next MD/Plan Renewal Date: 10/10/2023        Subjective:   *Pt reports daily use of Left UE compression via short stretch compression bandaging or Velcro-closure garment with compression glove. *Has not used Flexitouch Plus device since last session. Assessment:   Pt presenting with what appears to be stabilizing Left UE volume loss since she returned to active therapy on 9/13. Although more volume loss would be preferred (in order to reach limb status comparable to when last seen in 2022), stabilizing volume loss would indicate consideration for moving forward with being measured for new custom-fit garments. Pt would benefit from not only 23/7 compression, but also from daily use of her Flexitouch Plus device. Objective:  Arrived without compression on; states removed Velcro-closure garment to ease driving to session. OBSERVATION:     *Left UE:  Tissue quality over upper arm and elbow is with good superficial tissue mobility. Medial side of ventral surface of forearm is to boggy, slightly pitting with fair to good superficial tissue mobility; lateral side and dorsal surface is boggy to slightly boggy, slightly pitting with fair to good superficial tissue mobility; distal surfaces more involved than proximal. Dorsum of hand is boggy to slightly boggy, pitting; fingers are slightly boggy to supple. *Left breast/trunk:  Minimal, slightly boggy lymphedema over inferior surface of breast. Subtle, lymphedema over posterior trunk superior to scapular spine and adjacent to axilla. MEASUREMENTS:   *Left UE with relatively stable volume as compared with 10/11.  Has had a 2.1cm reduction since return to therapy on 9/13 for an overall reduction of 9.4cm. Right UE is 24.3cm larger than non-dominant Left with Right to Left circumferential differential reduced by an overall 13.5cm. TREATMENT:  *Volume re-measurement   *Left upper quadrant manual lymph drainage with soft tissue mobilization of dense areas of tissue and extended focus over forearm. Observed reduction in volume with improvement in superficial tissue mobility of forearm by end of MLD session. *Applied bandage system   COMPRESSION:  *Left UE: stockinet; 1/4\" foam insert over dorsum of hand/wrist crease; Cellona wrap; 3 short stretch compression bandages: 1, 4cm, 1, 8cm, 1, 10cm (DEFERRED -- Circaid arm and hand reduction garments)     Goals:  (to be met in 18 visits)  1. Pt will be independent in decongestive exercises. ACHIEVED   2. Pt will be independent in self-manual lymph drainage. ACHIEVED   3. Pt will obtain necessary supplies for reduction phase of therapy. ACHIEVED   4. Pt will tolerate Left UE short stretch compression bandaging for 20-23 hours. ACHIEVED   5. Pt/Caregiver will be independent in Left UE short stretch compression bandaging. ACHIEVED     6. Reduce Left UE lymphedema volume by 20-25cm to improve pt's comfort and self-esteem and allow pt to be re-fit for custom-fit garments. 7. Reduce Left UE lymphedema density to slightly boggy to reduce infection risk. 8. Pt will be independent in use of compression garments, self-manual lymph drainage, decongestive exercises and lymphedema precautions for life-long self-management of lymphedema. Plan:   Continue current plan.      Charges: 4 Manual Therapy    Total Timed Treatment: 55 min  Total Treatment Time: 60 min         UE Circumferential Measurements (cm) 5/10/2023  LEFT 5/10/2023  RIGHT 5/31/2023  LEFT   7/12/2023  LEFT 7/12/2023  RIGHT   Axilla 37.0 35.0 37.3 37.3 34.8   **12cm above elbow crease 37.2 35.0 36.6 36.8 35.0   10cm above elbow crease 37.0 35.0 36.7 36.5 34.3   5cm above elbow crease 37.5 33.8 36.6 37.0 34.0   Elbow crease 28.5 25.8 27.0 28.1 25.5          **17cm above ulnar styloid   30.9   25.8   29.7   30.7   25.6   15cm above ulnar styloid 30.7 25.6 29.7 30.5 25.4   10cm above ulnar styloid 28.0 23.2 27.1 28.1 23.6   5cm above ulnar styloid 23.0 18.7 21.8 22.9 19.0   Ulnar styloid 17.5 15.2 17.5 18.7 15.4     Mid-palm   18.7   18.1   18.7   19.2   17.7   Across MCPs 18.3 18.2 18.2 18.2 18.0   Thumb P1 6.3 6.1 6.1 6.2 6.1   Index P1 6.7 6.2 6.5 6.5 6.5   P2 5.3 5.1 5.1 5.2 5.0   Middle P1 6.4 5.9 6.4 6.4 5.9   P2 5.3 5.3 5.3 5.1 5.3   Ring P1 5.9 5.2 5.9 5.8 5.3   P2 4.8 4.7 4.8 4.7 4.6   Little P1 5.3 4.8 5.3 5.2 4.7   P2 4.2 4.0 4.1 4.0 3.9     TOTALS   394.5   356.7   386. 4   393.1   355.6            UE Circumferential Measurements (cm) 8/1/2023   LEFT 9/13/2023   LEFT 9/13/2023   RIGHT 9/25/2023  LEFT  9/25/2023   RIGHT   Axilla 36.2 37.0 35.0 36.3 35.9   **12cm above elbow crease 36.0 36.4 36.3 36.0 36.0   10cm above elbow crease 36.0 36.4 35.3 36.2 35.3   5cm above elbow crease 35.7 36.4 34.5 35.8 34.5   Elbow crease 27.1 28.0 26.3 27.5 26.3          **17cm above ulnar styloid   29.7   29.5   26.0   28.7   26.0   15cm above ulnar styloid 29.3 29.3 26.0 28.4 26.0   10cm above ulnar styloid 26.4 27.2 23.7 25.8 23.7   5cm above ulnar styloid 22.0 22.6 19.1 21.8 18.9   Ulnar styloid 18.0 18.7 15.5 17.5 15.1     Mid-palm   18.7   19.0   17.9   18.7   17.9   Across MCPs 18.0 18.3 18.2 18.4 18.2   Thumb P1 6.0 6.1 6.1 6.0 6.1   Index P1 6.3 6.5 6.1 6.5 6.1   P2 5.1 5.0 4.9 5.0 4.9   Middle P1 6.2 6.4 5.8 6.4 5.8   P2 5.2 5.3 5.0 5.3 5.0   Ring P1 5.9 5.9 5.2 6.1 5.2   P2 4.4 4.3 4.4 4.3 4.4   Little P1 5.1 5.0 4.8 5.3 4.8   P2 3.8 3.9 4.0 3.8 4.0     TOTALS   381.1   387. 2   360.1   379.8   360.1          UE Circumferential Measurements (cm) 10/11/2023    LEFT 10/11/2023   RIGHT 10/18/2023   LEFT   Axilla 36.7 35.9 36.2   **12cm above elbow crease 36.8 36.0 36.5   10cm above elbow crease 36.2 35.6 36.2   5cm above elbow crease 36.3 34.5 36.3   Elbow crease 27.5 26.2 27.5          **17cm above ulnar styloid   29.3   26.0   29.2   15cm above ulnar styloid 29.2 26.0 29.2   10cm above ulnar styloid 26.7 23.7 27.0   5cm above ulnar styloid 22.7 19.2 22.5   Ulnar styloid 18.2 15.3 18.0     Mid-palm   19.2   17.9   19.0   Across MCPs 18.6 18.2 18.4   Thumb P1 6.2 6.1 5.9   Index P1 6.7 6.1 6.6   P2 5.2 4.9 5.1   Middle P1 6.5 5.8 6.4   P2 5.3 5.0 5.3   Ring P1 6.1 5.2 6.0   P2 4.5 4.4 4.7   Little P1 5.2 4.8 5.2   P2 3.9 4.0 3.9     TOTALS   387.0   360.8   385.1

## 2023-10-23 ENCOUNTER — OFFICE VISIT (OUTPATIENT)
Dept: OCCUPATIONAL MEDICINE | Facility: HOSPITAL | Age: 53
End: 2023-10-23
Attending: INTERNAL MEDICINE
Payer: COMMERCIAL

## 2023-10-23 PROCEDURE — 97140 MANUAL THERAPY 1/> REGIONS: CPT

## 2023-10-23 NOTE — PROGRESS NOTES
iagnosis:   Lymphedema of left arm (I89.0)     Referring Provider: Clark Benitez  Date of Evaluation:    5/10/2023    Precautions:  Lymphedema; HTN; h/o CKD Next MD visit:   none scheduled  Date of Surgery: n/a   Insurance Primary/Secondary: [employer] [place] P[place] / N/A # Authorized Visits: 23/26            Next MD/Plan Renewal Date: 10/10/2023        Subjective:   *Pt reports daily use of Left UE compression via short stretch compression bandaging or Velcro-closure garment with compression glove. *Has not used Flexitouch Plus device since last session. Reports has been too busy. Assessment:   Pt with some improvement in the tissue quality of her Left forearm and hand, h/e continues to struggle with significant volume in her limb. Pt would benefit from not only 23/7 compression, but also from daily use of her Flexitouch Plus device. Objective:  Arrived without compression on; states removed Velcro-closure garment to ease driving to session. OBSERVATION:   *Improved superficial tissue mobility of dorsal surface of hand today with reduction in lymphedema density over dorsum of hand. *Left UE:  Tissue quality over upper arm and elbow is with good superficial tissue mobility. Medial side of ventral surface of forearm is to boggy, slightly pitting with fair to good superficial tissue mobility; lateral side and dorsal surface is slightly boggy, slightly pitting with fair to good superficial tissue mobility; distal surfaces more involved than proximal. Dorsum of hand is slightly boggy; fingers are slightly boggy to supple. *Left breast/trunk:  Minimal, slightly boggy lymphedema over inferior surface of breast. Subtle, lymphedema over posterior trunk superior to scapular spine and adjacent to axilla. MEASUREMENTS:   None taken   TREATMENT:  *Left upper quadrant manual lymph drainage with soft tissue mobilization of dense areas of tissue and extended focus over forearm.  Observed reduction in volume with improvement in superficial tissue mobility of forearm by end of MLD session. *Applied bandage system   COMPRESSION:  *Left UE: stockinet; 1/4\" foam insert over dorsum of hand/wrist crease; Cellona wrap; 3 short stretch compression bandages: 1, 4cm, 1, 8cm, 1, 10cm (DEFERRED -- Circaid arm and hand reduction garments)     Goals:  (to be met in 18 visits)  1. Pt will be independent in decongestive exercises. ACHIEVED   2. Pt will be independent in self-manual lymph drainage. ACHIEVED   3. Pt will obtain necessary supplies for reduction phase of therapy. ACHIEVED   4. Pt will tolerate Left UE short stretch compression bandaging for 20-23 hours. ACHIEVED   5. Pt/Caregiver will be independent in Left UE short stretch compression bandaging. ACHIEVED     6. Reduce Left UE lymphedema volume by 20-25cm to improve pt's comfort and self-esteem and allow pt to be re-fit for custom-fit garments. 7. Reduce Left UE lymphedema density to slightly boggy to reduce infection risk. 8. Pt will be independent in use of compression garments, self-manual lymph drainage, decongestive exercises and lymphedema precautions for life-long self-management of lymphedema. Plan:   Continue current plan. Pt scheduled to be measured for custom-fit garments on 10/31.      Charges: 4 Manual Therapy    Total Timed Treatment: 55 min  Total Treatment Time: 60 min         UE Circumferential Measurements (cm) 5/10/2023  LEFT 5/10/2023  RIGHT 5/31/2023  LEFT   7/12/2023  LEFT 7/12/2023  RIGHT   Axilla 37.0 35.0 37.3 37.3 34.8   **12cm above elbow crease 37.2 35.0 36.6 36.8 35.0   10cm above elbow crease 37.0 35.0 36.7 36.5 34.3   5cm above elbow crease 37.5 33.8 36.6 37.0 34.0   Elbow crease 28.5 25.8 27.0 28.1 25.5          **17cm above ulnar styloid   30.9   25.8   29.7   30.7   25.6   15cm above ulnar styloid 30.7 25.6 29.7 30.5 25.4   10cm above ulnar styloid 28.0 23.2 27.1 28.1 23.6   5cm above ulnar styloid 23.0 18.7 21.8 22.9 19.0   Ulnar styloid 17.5 15.2 17.5 18.7 15.4     Mid-palm   18.7   18.1   18.7   19.2   17.7   Across MCPs 18.3 18.2 18.2 18.2 18.0   Thumb P1 6.3 6.1 6.1 6.2 6.1   Index P1 6.7 6.2 6.5 6.5 6.5   P2 5.3 5.1 5.1 5.2 5.0   Middle P1 6.4 5.9 6.4 6.4 5.9   P2 5.3 5.3 5.3 5.1 5.3   Ring P1 5.9 5.2 5.9 5.8 5.3   P2 4.8 4.7 4.8 4.7 4.6   Little P1 5.3 4.8 5.3 5.2 4.7   P2 4.2 4.0 4.1 4.0 3.9     TOTALS   394.5   356.7   386. 4   393.1   355.6            UE Circumferential Measurements (cm) 8/1/2023   LEFT 9/13/2023   LEFT 9/13/2023   RIGHT 9/25/2023  LEFT  9/25/2023   RIGHT   Axilla 36.2 37.0 35.0 36.3 35.9   **12cm above elbow crease 36.0 36.4 36.3 36.0 36.0   10cm above elbow crease 36.0 36.4 35.3 36.2 35.3   5cm above elbow crease 35.7 36.4 34.5 35.8 34.5   Elbow crease 27.1 28.0 26.3 27.5 26.3          **17cm above ulnar styloid   29.7   29.5   26.0   28.7   26.0   15cm above ulnar styloid 29.3 29.3 26.0 28.4 26.0   10cm above ulnar styloid 26.4 27.2 23.7 25.8 23.7   5cm above ulnar styloid 22.0 22.6 19.1 21.8 18.9   Ulnar styloid 18.0 18.7 15.5 17.5 15.1     Mid-palm   18.7   19.0   17.9   18.7   17.9   Across MCPs 18.0 18.3 18.2 18.4 18.2   Thumb P1 6.0 6.1 6.1 6.0 6.1   Index P1 6.3 6.5 6.1 6.5 6.1   P2 5.1 5.0 4.9 5.0 4.9   Middle P1 6.2 6.4 5.8 6.4 5.8   P2 5.2 5.3 5.0 5.3 5.0   Ring P1 5.9 5.9 5.2 6.1 5.2   P2 4.4 4.3 4.4 4.3 4.4   Little P1 5.1 5.0 4.8 5.3 4.8   P2 3.8 3.9 4.0 3.8 4.0     TOTALS   381.1   387. 2   360.1   379.8   360.1          UE Circumferential Measurements (cm) 10/11/2023    LEFT 10/11/2023   RIGHT 10/18/2023   LEFT   Axilla 36.7 35.9 36.2   **12cm above elbow crease 36.8 36.0 36.5   10cm above elbow crease 36.2 35.6 36.2   5cm above elbow crease 36.3 34.5 36.3   Elbow crease 27.5 26.2 27.5          **17cm above ulnar styloid   29.3   26.0   29.2   15cm above ulnar styloid 29.2 26.0 29.2   10cm above ulnar styloid 26.7 23.7 27.0   5cm above ulnar styloid 22.7 19.2 22.5   Ulnar styloid 18.2 15.3 18.0     Mid-palm   19.2 17.9   19.0   Across MCPs 18.6 18.2 18.4   Thumb P1 6.2 6.1 5.9   Index P1 6.7 6.1 6.6   P2 5.2 4.9 5.1   Middle P1 6.5 5.8 6.4   P2 5.3 5.0 5.3   Ring P1 6.1 5.2 6.0   P2 4.5 4.4 4.7   Little P1 5.2 4.8 5.2   P2 3.9 4.0 3.9     TOTALS   387.0   360.8   385.1

## 2023-10-25 ENCOUNTER — OFFICE VISIT (OUTPATIENT)
Dept: OCCUPATIONAL MEDICINE | Facility: HOSPITAL | Age: 53
End: 2023-10-25
Attending: INTERNAL MEDICINE
Payer: COMMERCIAL

## 2023-10-25 PROCEDURE — 97140 MANUAL THERAPY 1/> REGIONS: CPT

## 2023-10-25 NOTE — PROGRESS NOTES
Diagnosis:   Lymphedema of left arm (I89.0)     Referring Provider: Erika Tapia  Date of Evaluation:    5/10/2023    Precautions:  Lymphedema; HTN; h/o CKD Next MD visit:   none scheduled  Date of Surgery: n/a   Insurance Primary/Secondary: [employer] [place] PPO / N/A # Authorized Visits: 24/26            Next MD/Plan Renewal Date: 10/10/2023        Subjective:   *Pt reports daily use of Left UE compression via short stretch compression bandaging or Velcro-closure garment with compression glove. Assessment:   Pt with stable volume loss and tissue quality in Left UE. Is scheduled to meet with  on 10/31 to be measured for garments. Pt would benefit from not only 23/7 compression, but also from daily use of her Flexitouch Plus device. Objective:  Arrived without compression on. OBSERVATION:     *Left UE:  Tissue quality over upper arm and elbow is with good superficial tissue mobility. Medial side of ventral surface of forearm is to boggy, slightly pitting with fair to good superficial tissue mobility; lateral side and dorsal surface is slightly boggy, slightly pitting with fair to good superficial tissue mobility; distal surfaces more involved than proximal. Dorsum of hand is slightly boggy; fingers are slightly boggy to supple. *Left breast/trunk:  Minimal, slightly boggy lymphedema over inferior surface of breast. Subtle, lymphedema over posterior trunk superior to scapular spine and adjacent to axilla. MEASUREMENTS:   None taken   TREATMENT:  *Left upper quadrant manual lymph drainage with soft tissue mobilization of dense areas of tissue and extended focus over forearm. Observed reduction in volume with improvement in superficial tissue mobility of forearm by end of MLD session.    *Applied bandage system   COMPRESSION:  *Left UE: stockinet; 1/4\" foam insert over dorsum of hand/wrist crease; Cellona wrap; 3 short stretch compression bandages: 1, 4cm, 1, 8cm, 1, 10cm (DEFERRED -- Circaid arm and hand reduction garments)     Goals:  (to be met in 18 visits)  1. Pt will be independent in decongestive exercises. ACHIEVED   2. Pt will be independent in self-manual lymph drainage. ACHIEVED   3. Pt will obtain necessary supplies for reduction phase of therapy. ACHIEVED   4. Pt will tolerate Left UE short stretch compression bandaging for 20-23 hours. ACHIEVED   5. Pt/Caregiver will be independent in Left UE short stretch compression bandaging. ACHIEVED     6. Reduce Left UE lymphedema volume by 20-25cm to improve pt's comfort and self-esteem and allow pt to be re-fit for custom-fit garments. 7. Reduce Left UE lymphedema density to slightly boggy to reduce infection risk. 8. Pt will be independent in use of compression garments, self-manual lymph drainage, decongestive exercises and lymphedema precautions for life-long self-management of lymphedema. Plan:   Continue current plan. Pt scheduled to be measured for custom-fit garments on 10/31.      Charges: 4 Manual Therapy    Total Timed Treatment: 55 min  Total Treatment Time: 60 min         UE Circumferential Measurements (cm) 5/10/2023  LEFT 5/10/2023  RIGHT 5/31/2023  LEFT   7/12/2023  LEFT 7/12/2023  RIGHT   Axilla 37.0 35.0 37.3 37.3 34.8   **12cm above elbow crease 37.2 35.0 36.6 36.8 35.0   10cm above elbow crease 37.0 35.0 36.7 36.5 34.3   5cm above elbow crease 37.5 33.8 36.6 37.0 34.0   Elbow crease 28.5 25.8 27.0 28.1 25.5          **17cm above ulnar styloid   30.9   25.8   29.7   30.7   25.6   15cm above ulnar styloid 30.7 25.6 29.7 30.5 25.4   10cm above ulnar styloid 28.0 23.2 27.1 28.1 23.6   5cm above ulnar styloid 23.0 18.7 21.8 22.9 19.0   Ulnar styloid 17.5 15.2 17.5 18.7 15.4     Mid-palm   18.7   18.1   18.7   19.2   17.7   Across MCPs 18.3 18.2 18.2 18.2 18.0   Thumb P1 6.3 6.1 6.1 6.2 6.1   Index P1 6.7 6.2 6.5 6.5 6.5   P2 5.3 5.1 5.1 5.2 5.0   Middle P1 6.4 5.9 6.4 6.4 5.9   P2 5.3 5.3 5.3 5.1 5.3   Ring P1 5.9 5.2 5.9 5.8 5.3   P2 4.8 4.7 4.8 4.7 4.6   Little P1 5.3 4.8 5.3 5.2 4.7   P2 4.2 4.0 4.1 4.0 3.9     TOTALS   394.5   356.7   386. 4   393.1   355.6            UE Circumferential Measurements (cm) 8/1/2023   LEFT 9/13/2023   LEFT 9/13/2023   RIGHT 9/25/2023  LEFT  9/25/2023   RIGHT   Axilla 36.2 37.0 35.0 36.3 35.9   **12cm above elbow crease 36.0 36.4 36.3 36.0 36.0   10cm above elbow crease 36.0 36.4 35.3 36.2 35.3   5cm above elbow crease 35.7 36.4 34.5 35.8 34.5   Elbow crease 27.1 28.0 26.3 27.5 26.3          **17cm above ulnar styloid   29.7   29.5   26.0   28.7   26.0   15cm above ulnar styloid 29.3 29.3 26.0 28.4 26.0   10cm above ulnar styloid 26.4 27.2 23.7 25.8 23.7   5cm above ulnar styloid 22.0 22.6 19.1 21.8 18.9   Ulnar styloid 18.0 18.7 15.5 17.5 15.1     Mid-palm   18.7   19.0   17.9   18.7   17.9   Across MCPs 18.0 18.3 18.2 18.4 18.2   Thumb P1 6.0 6.1 6.1 6.0 6.1   Index P1 6.3 6.5 6.1 6.5 6.1   P2 5.1 5.0 4.9 5.0 4.9   Middle P1 6.2 6.4 5.8 6.4 5.8   P2 5.2 5.3 5.0 5.3 5.0   Ring P1 5.9 5.9 5.2 6.1 5.2   P2 4.4 4.3 4.4 4.3 4.4   Little P1 5.1 5.0 4.8 5.3 4.8   P2 3.8 3.9 4.0 3.8 4.0     TOTALS   381.1   387. 2   360.1   379.8   360.1          UE Circumferential Measurements (cm) 10/11/2023    LEFT 10/11/2023   RIGHT 10/18/2023   LEFT   Axilla 36.7 35.9 36.2   **12cm above elbow crease 36.8 36.0 36.5   10cm above elbow crease 36.2 35.6 36.2   5cm above elbow crease 36.3 34.5 36.3   Elbow crease 27.5 26.2 27.5          **17cm above ulnar styloid   29.3   26.0   29.2   15cm above ulnar styloid 29.2 26.0 29.2   10cm above ulnar styloid 26.7 23.7 27.0   5cm above ulnar styloid 22.7 19.2 22.5   Ulnar styloid 18.2 15.3 18.0     Mid-palm   19.2   17.9   19.0   Across MCPs 18.6 18.2 18.4   Thumb P1 6.2 6.1 5.9   Index P1 6.7 6.1 6.6   P2 5.2 4.9 5.1   Middle P1 6.5 5.8 6.4   P2 5.3 5.0 5.3   Ring P1 6.1 5.2 6.0   P2 4.5 4.4 4.7   Little P1 5.2 4.8 5.2   P2 3.9 4.0 3.9     TOTALS   387.0   360.8   385.1

## 2023-10-28 DIAGNOSIS — I10 ESSENTIAL HYPERTENSION: ICD-10-CM

## 2023-10-29 RX ORDER — LOSARTAN POTASSIUM AND HYDROCHLOROTHIAZIDE 12.5; 5 MG/1; MG/1
1 TABLET ORAL DAILY
Qty: 30 TABLET | Refills: 5 | Status: SHIPPED | OUTPATIENT
Start: 2023-10-29 | End: 2024-10-23

## 2023-11-01 ENCOUNTER — OFFICE VISIT (OUTPATIENT)
Dept: OCCUPATIONAL MEDICINE | Facility: HOSPITAL | Age: 53
End: 2023-11-01
Attending: INTERNAL MEDICINE
Payer: COMMERCIAL

## 2023-11-01 PROCEDURE — 97140 MANUAL THERAPY 1/> REGIONS: CPT

## 2023-11-01 NOTE — PROGRESS NOTES
Discharge Summary  Pt has attended 25/26 visits in Occupational Therapy from 5/10-11/1/2023. Subjective:   Pt reports she was measured for new custom-fit garments on 10/31; anticipates delivery in 2 weeks. Reports use of Left UE Circaid garment during the day / has not been using compression glove very often as she has needed \"to do a lot of typing at work and I can't type while wearing the glove. \" Using nighttime garment every night. Has not used Flexitouch Plus device in over a week. Assessment:   While pt made progress towards the reduction of her Left UE lymphedema volume and density, her volume loss / improvement in tissue quality was not as anticipated. Treatment barriers appear to have been the aggressive nature of her stage II lymphedema along with some inconsistencies in pt use of compression for reduction purposes and, at times, using prior ill-fitting compression sleeves; additionally, more consistent use of her Flexitouch Plus device following the recovery from her Right breast reduction surgery would have been helpful in her quest to improve the quality of her limb. Presently, she has been re-measured for custom-fit garments. She has been highly encouraged to return for a follow-up visit once she has received her garments so the fit of her garments and her response to them can be assessed. When discussed today, pt refused to consider a return visit, stating she has too much to do / catch up on; that she is not going to do any more therapy. After her prior course of lymphedema therapy, she required multiple re-makes on her custom-fit garments so it is unfortunate that she is unwilling to return for a garment check. Pt to be discharged at this time on her request.     Objective:  Pt attended Occupational Therapy for Left upper quadrant manual lymph drainage; re-instruction in self-manual lymph drainage; guidance in return to use of Flexitouch Plus device; assist with garment prescription.    On 10/31 pt was measured for the following garments: custom-fit Elvarex compression sleeves and long length glove, CCL2; Tribute Slimline custom-fit nighttime garment. Today's session activities included: re-assessment of status; encouragement to return for follow-up visit after receiving garments; discussion of discharge recommendations / instruction in short stretch compression bandaging over Jobst Relax nighttime garment / importance of daily compression of limb stressed while waiting garment arrival; reinforcement in use of Flexitouch Plus device; Left upper quadrant manual lymph drainage followed by short stretch compression bandaging of Left UE. EDEMA/TISSUE QUALITY:   *Left UE:  Left UE  lymphedema volume reduced by 10.cm since initial eval. Left UE is 24.2cm larger than dominant Right, with a Left to Right limb differential reduction of 13.6cm. Tissue quality over upper arm and elbow is with good superficial tissue mobility. Medial side of ventral surface of forearm is to boggy, slightly pitting with fair to good superficial tissue mobility; lateral side and dorsal surface is slightly boggy, slightly pitting with fair to good superficial tissue mobility; distal surfaces more involved than proximal. Dorsum of hand is slightly boggy; fingers are slightly boggy to supple. *Left breast/trunk:  Minimal, slightly boggy lymphedema over inferior surface of breast. Subtle, lymphedema over posterior trunk superior to scapular spine and adjacent to axilla. Recommendations:  1. Continue with use of daily short stretch compression bandaging or Circaid reduction garment for Left UE compression. Consider short stretch compression bandaging over Jobst Relax nighttime garment. 2.  Daily use of Flexitouch Plus device. If unable to use device, complete self-manual lymph drainage. 3.  Once received, wearing compression garments on a daily / nightly basis. Replace on a timely basis.    4.  Contact this therapist/department for any questions/concerns. Goals:  (to be met in 18 visits)  1. Pt will be independent in decongestive exercises. ACHIEVED   2. Pt will be independent in self-manual lymph drainage. ACHIEVED   3. Pt will obtain necessary supplies for reduction phase of therapy. ACHIEVED   4. Pt will tolerate Left UE short stretch compression bandaging for 20-23 hours. ACHIEVED   5. Pt/Caregiver will be independent in Left UE short stretch compression bandaging. ACHIEVED     6. Reduce Left UE lymphedema volume by 20-25cm to improve pt's comfort and self-esteem and allow pt to be re-fit for custom-fit garments. ANTICIPATED VOLUME REDUCTION NOT ACHIEVED; VOLUME LOSS STABILIZED, LEADING TO RE-FITTING OF CUSTOM-FIT GARMENTS  7. Reduce Left UE lymphedema density to slightly boggy to reduce infection risk. NEARLY ACHIEVED   8. Pt will be independent in use of compression garments, self-manual lymph drainage, decongestive exercises and lymphedema precautions for life-long self-management of lymphedema. ACHIEVED     Thank you for your referral. If you have any questions, please contact me at Dept: [phone]. Sincerely,  Electronically signed by therapist: Ulisses Casillas.  CHARLOTTE Hui      Charges: 4 Manual Therapy    Total Timed Treatment: 55 min  Total Treatment Time: 60 min         UE Circumferential Measurements (cm) 5/10/2023  LEFT 5/10/2023  RIGHT 5/31/2023  LEFT   7/12/2023  LEFT 7/12/2023  RIGHT   Axilla 37.0 35.0 37.3 37.3 34.8   **12cm above elbow crease 37.2 35.0 36.6 36.8 35.0   10cm above elbow crease 37.0 35.0 36.7 36.5 34.3   5cm above elbow crease 37.5 33.8 36.6 37.0 34.0   Elbow crease 28.5 25.8 27.0 28.1 25.5          **17cm above ulnar styloid   30.9   25.8   29.7   30.7   25.6   15cm above ulnar styloid 30.7 25.6 29.7 30.5 25.4   10cm above ulnar styloid 28.0 23.2 27.1 28.1 23.6   5cm above ulnar styloid 23.0 18.7 21.8 22.9 19.0   Ulnar styloid 17.5 15.2 17.5 18.7 15.4     Mid-palm 18.7   18.1   18.7   19.2   17.7   Across MCPs 18.3 18.2 18.2 18.2 18.0   Thumb P1 6.3 6.1 6.1 6.2 6.1   Index P1 6.7 6.2 6.5 6.5 6.5   P2 5.3 5.1 5.1 5.2 5.0   Middle P1 6.4 5.9 6.4 6.4 5.9   P2 5.3 5.3 5.3 5.1 5.3   Ring P1 5.9 5.2 5.9 5.8 5.3   P2 4.8 4.7 4.8 4.7 4.6   Little P1 5.3 4.8 5.3 5.2 4.7   P2 4.2 4.0 4.1 4.0 3.9     TOTALS   394.5   356.7   386. 4   393.1   355.6            UE Circumferential Measurements (cm) 8/1/2023   LEFT 9/13/2023   LEFT 9/13/2023   RIGHT 9/25/2023  LEFT  9/25/2023   RIGHT   Axilla 36.2 37.0 35.0 36.3 35.9   **12cm above elbow crease 36.0 36.4 36.3 36.0 36.0   10cm above elbow crease 36.0 36.4 35.3 36.2 35.3   5cm above elbow crease 35.7 36.4 34.5 35.8 34.5   Elbow crease 27.1 28.0 26.3 27.5 26.3          **17cm above ulnar styloid   29.7   29.5   26.0   28.7   26.0   15cm above ulnar styloid 29.3 29.3 26.0 28.4 26.0   10cm above ulnar styloid 26.4 27.2 23.7 25.8 23.7   5cm above ulnar styloid 22.0 22.6 19.1 21.8 18.9   Ulnar styloid 18.0 18.7 15.5 17.5 15.1     Mid-palm   18.7   19.0   17.9   18.7   17.9   Across MCPs 18.0 18.3 18.2 18.4 18.2   Thumb P1 6.0 6.1 6.1 6.0 6.1   Index P1 6.3 6.5 6.1 6.5 6.1   P2 5.1 5.0 4.9 5.0 4.9   Middle P1 6.2 6.4 5.8 6.4 5.8   P2 5.2 5.3 5.0 5.3 5.0   Ring P1 5.9 5.9 5.2 6.1 5.2   P2 4.4 4.3 4.4 4.3 4.4   Little P1 5.1 5.0 4.8 5.3 4.8   P2 3.8 3.9 4.0 3.8 4.0     TOTALS   381.1   387. 2   360.1   379.8   360.1          UE Circumferential Measurements (cm) 10/11/2023    LEFT 10/11/2023   RIGHT 10/18/2023   LEFT 11/1/2023  LEFT 11/1/2023   RIGHT   Axilla 36.7 35.9 36.2 36.5 35.2   **12cm above elbow crease 36.8 36.0 36.5 36.8 36.3   10cm above elbow crease 36.2 35.6 36.2 36.7 35.6   5cm above elbow crease 36.3 34.5 36.3 36.5 34.5   Elbow crease 27.5 26.2 27.5 27.1 26.2          **17cm above ulnar styloid   29.3   26.0   29.2   29.2   25.8   15cm above ulnar styloid 29.2 26.0 29.2 28.7 25.8   10cm above ulnar styloid 26.7 23.7 27.0 26.0 23.6   5cm above ulnar styloid 22.7 19.2 22.5 22.3 19.0   Ulnar styloid 18.2 15.3 18.0 18.0 15.5     Mid-palm   19.2   17.9   19.0   18.7   18.0   Across MCPs 18.6 18.2 18.4 18.1 17.7   Thumb P1 6.2 6.1 5.9 5.9 6.0   Index P1 6.7 6.1 6.6 6.5 6.2   P2 5.2 4.9 5.1 5.3 4.9   Middle P1 6.5 5.8 6.4 6.5 5.7   P2 5.3 5.0 5.3 5.3 4.9   Ring P1 6.1 5.2 6.0 6.0 5.3   P2 4.5 4.4 4.7 4.5 4.4   Little P1 5.2 4.8 5.2 5.2 4.8   P2 3.9 4.0 3.9 3.9 4.1     TOTALS   387.0   360.8   385.1   383.7   359.5

## 2023-11-09 ENCOUNTER — TELEPHONE (OUTPATIENT)
Dept: INTERNAL MEDICINE CLINIC | Facility: CLINIC | Age: 53
End: 2023-11-09

## 2023-11-09 NOTE — TELEPHONE ENCOUNTER
Received order for medical compression garments from [employer].  Put in TB bin for review and signature.

## 2023-11-09 NOTE — TELEPHONE ENCOUNTER
[facility] has faxed us a DME order 2x--They are faxing again and would appreciate a call to let them know we received this.     [phone]

## 2023-11-17 ENCOUNTER — OFFICE VISIT (OUTPATIENT)
Dept: HEMATOLOGY/ONCOLOGY | Facility: HOSPITAL | Age: 53
End: 2023-11-17
Attending: INTERNAL MEDICINE
Payer: COMMERCIAL

## 2023-11-17 VITALS
DIASTOLIC BLOOD PRESSURE: 74 MMHG | BODY MASS INDEX: 34.75 KG/M2 | RESPIRATION RATE: 16 BRPM | SYSTOLIC BLOOD PRESSURE: 108 MMHG | HEIGHT: 60 IN | WEIGHT: 177 LBS | HEART RATE: 114 BPM | OXYGEN SATURATION: 100 % | TEMPERATURE: 99 F

## 2023-11-17 DIAGNOSIS — C77.9 REGIONAL LYMPH NODE METASTASIS PRESENT (HCC): ICD-10-CM

## 2023-11-17 DIAGNOSIS — Z17.0 MALIGNANT NEOPLASM OF OVERLAPPING SITES OF LEFT BREAST IN FEMALE, ESTROGEN RECEPTOR POSITIVE: Primary | ICD-10-CM

## 2023-11-17 DIAGNOSIS — Z79.811 AROMATASE INHIBITOR USE: ICD-10-CM

## 2023-11-17 DIAGNOSIS — Z78.0 POSTMENOPAUSAL: ICD-10-CM

## 2023-11-17 DIAGNOSIS — C50.812 MALIGNANT NEOPLASM OF OVERLAPPING SITES OF LEFT BREAST IN FEMALE, ESTROGEN RECEPTOR POSITIVE: Primary | ICD-10-CM

## 2023-11-17 LAB
ALBUMIN SERPL-MCNC: 3.6 G/DL (ref 3.4–5)
ALBUMIN/GLOB SERPL: 1 {RATIO} (ref 1–2)
ALP LIVER SERPL-CCNC: 70 U/L
ALT SERPL-CCNC: 24 U/L
ANION GAP SERPL CALC-SCNC: 9 MMOL/L (ref 0–18)
AST SERPL-CCNC: 18 U/L (ref 15–37)
BASOPHILS # BLD AUTO: 0.03 X10(3) UL (ref 0–0.2)
BASOPHILS NFR BLD AUTO: 0.4 %
BILIRUB SERPL-MCNC: 1 MG/DL (ref 0.1–2)
BUN BLD-MCNC: 17 MG/DL (ref 9–23)
CALCIUM BLD-MCNC: 9.1 MG/DL (ref 8.5–10.1)
CHLORIDE SERPL-SCNC: 104 MMOL/L (ref 98–112)
CO2 SERPL-SCNC: 21 MMOL/L (ref 21–32)
CREAT BLD-MCNC: 1.12 MG/DL
EGFRCR SERPLBLD CKD-EPI 2021: 58 ML/MIN/1.73M2 (ref 60–?)
EOSINOPHIL # BLD AUTO: 0.3 X10(3) UL (ref 0–0.7)
EOSINOPHIL NFR BLD AUTO: 4.4 %
ERYTHROCYTE [DISTWIDTH] IN BLOOD BY AUTOMATED COUNT: 11.9 %
GLOBULIN PLAS-MCNC: 3.5 G/DL (ref 2.8–4.4)
GLUCOSE BLD-MCNC: 137 MG/DL (ref 70–99)
HCT VFR BLD AUTO: 37.5 %
HGB BLD-MCNC: 13.1 G/DL
IMM GRANULOCYTES # BLD AUTO: 0.02 X10(3) UL (ref 0–1)
IMM GRANULOCYTES NFR BLD: 0.3 %
LYMPHOCYTES # BLD AUTO: 2.04 X10(3) UL (ref 1–4)
LYMPHOCYTES NFR BLD AUTO: 29.9 %
MCH RBC QN AUTO: 31.7 PG (ref 26–34)
MCHC RBC AUTO-ENTMCNC: 34.9 G/DL (ref 31–37)
MCV RBC AUTO: 90.8 FL
MONOCYTES # BLD AUTO: 0.6 X10(3) UL (ref 0.1–1)
MONOCYTES NFR BLD AUTO: 8.8 %
NEUTROPHILS # BLD AUTO: 3.84 X10 (3) UL (ref 1.5–7.7)
NEUTROPHILS # BLD AUTO: 3.84 X10(3) UL (ref 1.5–7.7)
NEUTROPHILS NFR BLD AUTO: 56.2 %
OSMOLALITY SERPL CALC.SUM OF ELEC: 282 MOSM/KG (ref 275–295)
PLATELET # BLD AUTO: 234 10(3)UL (ref 150–450)
POTASSIUM SERPL-SCNC: 3.9 MMOL/L (ref 3.5–5.1)
PROT SERPL-MCNC: 7.1 G/DL (ref 6.4–8.2)
RBC # BLD AUTO: 4.13 X10(6)UL
SODIUM SERPL-SCNC: 134 MMOL/L (ref 136–145)
VIT D+METAB SERPL-MCNC: 22.8 NG/ML (ref 30–100)
WBC # BLD AUTO: 6.8 X10(3) UL (ref 4–11)

## 2023-11-17 PROCEDURE — 96374 THER/PROPH/DIAG INJ IV PUSH: CPT

## 2023-11-17 PROCEDURE — 99214 OFFICE O/P EST MOD 30 MIN: CPT | Performed by: INTERNAL MEDICINE

## 2023-11-17 RX ORDER — ERGOCALCIFEROL 1.25 MG/1
50000 CAPSULE ORAL WEEKLY
Qty: 12 CAPSULE | Refills: 0 | Status: SHIPPED | OUTPATIENT
Start: 2023-11-17 | End: 2024-02-03

## 2023-11-17 NOTE — PROGRESS NOTES
Education Record    Learner:  Patient    Disease / [address] breast cancer      Barriers / Limitations:  None   Comments:    Method:  Discussion   Comments:    General Topics:  Plan of care reviewed   Comments:    Outcome:  Shows understanding   Comments:   Scheduled for breast MRI and mammogram.   Taking anastrozole. Zometa Q 6 months  Has been going to OT for lymphadema. Occ hot flashes at night. Denies joint pain. Did have her breast reduction surgery, happy with result.

## 2023-11-17 NOTE — PROGRESS NOTES
Education Record    Learner:  Patient    Disease / Diagnosis: Here for Zometa    Barriers / Limitations:  None    Method:  Brief focused, printed material and  reinforcement    General Topics:  Plan of care reviewed    Outcome:  Shows understanding. Pt tolerated infusion without incident. Appt requested for 6 months. Left in stable condition.

## 2023-11-27 DIAGNOSIS — Z17.0 MALIGNANT NEOPLASM OF OVERLAPPING SITES OF LEFT BREAST IN FEMALE, ESTROGEN RECEPTOR POSITIVE: Primary | ICD-10-CM

## 2023-11-27 DIAGNOSIS — C50.812 MALIGNANT NEOPLASM OF OVERLAPPING SITES OF LEFT BREAST IN FEMALE, ESTROGEN RECEPTOR POSITIVE: Primary | ICD-10-CM

## 2023-11-27 RX ORDER — ANASTROZOLE 1 MG/1
1 TABLET ORAL DAILY
Qty: 90 TABLET | Refills: 0 | Status: SHIPPED | OUTPATIENT
Start: 2023-11-27 | End: 2024-02-25

## 2023-11-28 ENCOUNTER — OFFICE VISIT (OUTPATIENT)
Dept: INTERNAL MEDICINE CLINIC | Facility: CLINIC | Age: 53
End: 2023-11-28
Payer: COMMERCIAL

## 2023-11-28 ENCOUNTER — PATIENT MESSAGE (OUTPATIENT)
Dept: INTERNAL MEDICINE CLINIC | Facility: CLINIC | Age: 53
End: 2023-11-28

## 2023-11-28 VITALS
DIASTOLIC BLOOD PRESSURE: 82 MMHG | SYSTOLIC BLOOD PRESSURE: 134 MMHG | OXYGEN SATURATION: 95 % | RESPIRATION RATE: 16 BRPM | BODY MASS INDEX: 37.95 KG/M2 | HEIGHT: 58 IN | HEART RATE: 73 BPM | WEIGHT: 180.81 LBS

## 2023-11-28 DIAGNOSIS — F41.9 ANXIETY: ICD-10-CM

## 2023-11-28 DIAGNOSIS — E55.9 VITAMIN D DEFICIENCY: ICD-10-CM

## 2023-11-28 DIAGNOSIS — C50.812 MALIGNANT NEOPLASM OF OVERLAPPING SITES OF LEFT BREAST IN FEMALE, ESTROGEN RECEPTOR POSITIVE: ICD-10-CM

## 2023-11-28 DIAGNOSIS — J45.20 MILD INTERMITTENT ASTHMA WITHOUT COMPLICATION: ICD-10-CM

## 2023-11-28 DIAGNOSIS — Z17.0 MALIGNANT NEOPLASM OF OVERLAPPING SITES OF LEFT BREAST IN FEMALE, ESTROGEN RECEPTOR POSITIVE: ICD-10-CM

## 2023-11-28 DIAGNOSIS — Z00.00 ROUTINE GENERAL MEDICAL EXAMINATION AT A HEALTH CARE FACILITY: Primary | ICD-10-CM

## 2023-11-28 DIAGNOSIS — I10 ESSENTIAL HYPERTENSION: ICD-10-CM

## 2023-11-28 DIAGNOSIS — Z23 NEED FOR INFLUENZA VACCINATION: ICD-10-CM

## 2023-11-28 DIAGNOSIS — Z13.220 SCREENING FOR LIPID DISORDERS: ICD-10-CM

## 2023-11-28 DIAGNOSIS — R73.9 BLOOD GLUCOSE ELEVATED: ICD-10-CM

## 2023-11-28 DIAGNOSIS — Z23 NEED FOR PNEUMOCOCCAL VACCINATION: ICD-10-CM

## 2023-11-28 PROCEDURE — 90472 IMMUNIZATION ADMIN EACH ADD: CPT | Performed by: INTERNAL MEDICINE

## 2023-11-28 PROCEDURE — 90686 IIV4 VACC NO PRSV 0.5 ML IM: CPT | Performed by: INTERNAL MEDICINE

## 2023-11-28 PROCEDURE — 3075F SYST BP GE 130 - 139MM HG: CPT | Performed by: INTERNAL MEDICINE

## 2023-11-28 PROCEDURE — 90471 IMMUNIZATION ADMIN: CPT | Performed by: INTERNAL MEDICINE

## 2023-11-28 PROCEDURE — 3079F DIAST BP 80-89 MM HG: CPT | Performed by: INTERNAL MEDICINE

## 2023-11-28 PROCEDURE — 3008F BODY MASS INDEX DOCD: CPT | Performed by: INTERNAL MEDICINE

## 2023-11-28 PROCEDURE — 99396 PREV VISIT EST AGE 40-64: CPT | Performed by: INTERNAL MEDICINE

## 2023-11-28 PROCEDURE — 90677 PCV20 VACCINE IM: CPT | Performed by: INTERNAL MEDICINE

## 2023-11-28 RX ORDER — ALBUTEROL SULFATE 90 UG/1
2 AEROSOL, METERED RESPIRATORY (INHALATION) EVERY 4 HOURS PRN
Qty: 1 EACH | Refills: 1 | Status: SHIPPED | OUTPATIENT
Start: 2023-11-28

## 2023-11-28 RX ORDER — ALPRAZOLAM 0.25 MG/1
0.25 TABLET ORAL NIGHTLY PRN
Qty: 10 TABLET | Refills: 1 | Status: SHIPPED | OUTPATIENT
Start: 2023-11-28

## 2023-12-12 ENCOUNTER — HOSPITAL ENCOUNTER (OUTPATIENT)
Dept: MRI IMAGING | Facility: HOSPITAL | Age: 53
Discharge: HOME | End: 2023-12-12
Attending: SURGERY
Payer: COMMERCIAL

## 2023-12-12 ENCOUNTER — HOSPITAL ENCOUNTER (OUTPATIENT)
Dept: MAMMOGRAPHY | Facility: HOSPITAL | Age: 53
Discharge: HOME | End: 2023-12-12
Attending: SURGERY
Payer: COMMERCIAL

## 2023-12-12 ENCOUNTER — OFFICE VISIT (OUTPATIENT)
Facility: CLINIC | Age: 53
End: 2023-12-12
Payer: COMMERCIAL

## 2023-12-12 VITALS
WEIGHT: 179.19 LBS | HEART RATE: 73 BPM | BODY MASS INDEX: 36.12 KG/M2 | HEIGHT: 59 IN | DIASTOLIC BLOOD PRESSURE: 70 MMHG | SYSTOLIC BLOOD PRESSURE: 118 MMHG

## 2023-12-12 DIAGNOSIS — Z17.0 MALIGNANT NEOPLASM OF OVERLAPPING SITES OF LEFT BREAST IN FEMALE, ESTROGEN RECEPTOR POSITIVE  (HCC): ICD-10-CM

## 2023-12-12 DIAGNOSIS — C50.812 MALIGNANT NEOPLASM OF OVERLAPPING SITES OF LEFT BREAST IN FEMALE, ESTROGEN RECEPTOR POSITIVE  (HCC): ICD-10-CM

## 2023-12-12 DIAGNOSIS — Z01.419 ENCOUNTER FOR ANNUAL ROUTINE GYNECOLOGICAL EXAMINATION: Primary | ICD-10-CM

## 2023-12-12 PROCEDURE — 87624 HPV HI-RISK TYP POOLED RSLT: CPT | Performed by: STUDENT IN AN ORGANIZED HEALTH CARE EDUCATION/TRAINING PROGRAM

## 2023-12-12 PROCEDURE — 77061 BREAST TOMOSYNTHESIS UNI: CPT | Performed by: SURGERY

## 2023-12-12 PROCEDURE — 77065 DX MAMMO INCL CAD UNI: CPT | Performed by: SURGERY

## 2023-12-12 PROCEDURE — A9575 INJ GADOTERATE MEGLUMI 0.1ML: HCPCS | Performed by: SURGERY

## 2023-12-12 PROCEDURE — 3008F BODY MASS INDEX DOCD: CPT | Performed by: STUDENT IN AN ORGANIZED HEALTH CARE EDUCATION/TRAINING PROGRAM

## 2023-12-12 PROCEDURE — 99386 PREV VISIT NEW AGE 40-64: CPT | Performed by: STUDENT IN AN ORGANIZED HEALTH CARE EDUCATION/TRAINING PROGRAM

## 2023-12-12 PROCEDURE — 3074F SYST BP LT 130 MM HG: CPT | Performed by: STUDENT IN AN ORGANIZED HEALTH CARE EDUCATION/TRAINING PROGRAM

## 2023-12-12 PROCEDURE — 3078F DIAST BP <80 MM HG: CPT | Performed by: STUDENT IN AN ORGANIZED HEALTH CARE EDUCATION/TRAINING PROGRAM

## 2023-12-12 PROCEDURE — 77049 MRI BREAST C-+ W/CAD BI: CPT | Performed by: SURGERY

## 2023-12-12 RX ORDER — GADOTERATE MEGLUMINE 376.9 MG/ML
20 INJECTION INTRAVENOUS
Status: COMPLETED | OUTPATIENT
Start: 2023-12-12 | End: 2023-12-12

## 2023-12-12 RX ADMIN — GADOTERATE MEGLUMINE 20 ML: 376.9 INJECTION INTRAVENOUS at 18:00:00

## 2023-12-13 LAB — HPV I/H RISK 1 DNA SPEC QL NAA+PROBE: NEGATIVE

## 2023-12-19 LAB
.: NORMAL
.: NORMAL

## 2023-12-23 NOTE — TELEPHONE ENCOUNTER
Labs completed per Linnea Sanchez protocol on 11/17/23 for cbc, cmp, and vit D. Active lipid A1c on 11/28/23. Placed tsh per protocol.

## 2023-12-27 NOTE — TELEPHONE ENCOUNTER
[facility] calling as we aren't faxing the correct order back to them.  They are going to re-fax right now which includes different items on it

## 2023-12-27 NOTE — TELEPHONE ENCOUNTER
Received paperwork from [facility]. Refaxed form as requested with LOV notes from 11/28/23 with TB. Confirmation received.

## 2024-01-04 ENCOUNTER — PATIENT MESSAGE (OUTPATIENT)
Dept: INTERNAL MEDICINE CLINIC | Facility: CLINIC | Age: 54
End: 2024-01-04

## 2024-01-08 NOTE — TELEPHONE ENCOUNTER
Form received in triage bin. Started filling out and placed in TB bin for completion/signature. Page attached with instruction on completion for insurance guidelines.

## 2024-01-08 NOTE — TELEPHONE ENCOUNTER
From: Makeda Garduno  To: Camille Guzman  Sent: 1/4/2024 12:22 PM CST  Subject: [facility] lymphedema garments    Hi - I did have them order additional garments fyi so not sure if this is why they need the additional paperwork. They said my insurance would cover so I got them before year end- they need something from your office but I don’t know what - I will include the message they left me.     Maybe they are sending it to the wrong place and you aren’t getting it? Please advise.

## 2024-01-10 NOTE — TELEPHONE ENCOUNTER
TB, was form faxed/given to someone to fax? I don't see a confirmed fax note from staff. Please confirm and will update pt. Thank you.

## 2024-01-10 NOTE — TELEPHONE ENCOUNTER
Josefa from [facility] medical ph. [phone] on the line checking on the status. Fax# [phone]

## 2024-01-11 ENCOUNTER — TELEPHONE (OUTPATIENT)
Dept: INTERNAL MEDICINE CLINIC | Facility: CLINIC | Age: 54
End: 2024-01-11

## 2024-01-11 NOTE — TELEPHONE ENCOUNTER
Reviewed document and added refills. Faxed to [facility] and also faxed most recent office note, as requested via patient's message attachment. Confirmation received. Will place order in TE bin in triage room in case needed further. Copy sent to scan.

## 2024-02-03 DIAGNOSIS — Z17.0 MALIGNANT NEOPLASM OF OVERLAPPING SITES OF LEFT BREAST IN FEMALE, ESTROGEN RECEPTOR POSITIVE  (HCC): ICD-10-CM

## 2024-02-03 DIAGNOSIS — C50.812 MALIGNANT NEOPLASM OF OVERLAPPING SITES OF LEFT BREAST IN FEMALE, ESTROGEN RECEPTOR POSITIVE  (HCC): ICD-10-CM

## 2024-02-05 RX ORDER — ANASTROZOLE 1 MG/1
1 TABLET ORAL DAILY
Qty: 90 TABLET | Refills: 1 | Status: SHIPPED | OUTPATIENT
Start: 2024-02-05

## 2024-02-07 ENCOUNTER — HOSPITAL ENCOUNTER (OUTPATIENT)
Age: 54
Discharge: HOME | End: 2024-02-07
Payer: COMMERCIAL

## 2024-02-07 VITALS
DIASTOLIC BLOOD PRESSURE: 92 MMHG | OXYGEN SATURATION: 95 % | TEMPERATURE: 98 F | WEIGHT: 175 LBS | SYSTOLIC BLOOD PRESSURE: 130 MMHG | HEART RATE: 84 BPM | BODY MASS INDEX: 35 KG/M2 | RESPIRATION RATE: 18 BRPM

## 2024-02-07 DIAGNOSIS — R09.81 SINUS CONGESTION: ICD-10-CM

## 2024-02-07 DIAGNOSIS — H66.91 RIGHT OTITIS MEDIA, UNSPECIFIED OTITIS MEDIA TYPE: Primary | ICD-10-CM

## 2024-02-07 LAB — S PYO AG THROAT QL: NEGATIVE

## 2024-02-07 PROCEDURE — 99213 OFFICE O/P EST LOW 20 MIN: CPT | Performed by: NURSE PRACTITIONER

## 2024-02-07 PROCEDURE — 87880 STREP A ASSAY W/OPTIC: CPT | Performed by: NURSE PRACTITIONER

## 2024-02-07 RX ORDER — AMOXICILLIN AND CLAVULANATE POTASSIUM 875; 125 MG/1; MG/1
1 TABLET, FILM COATED ORAL 2 TIMES DAILY
Qty: 20 TABLET | Refills: 0 | Status: SHIPPED | OUTPATIENT
Start: 2024-02-07 | End: 2024-02-17

## 2024-02-07 NOTE — ED INITIAL ASSESSMENT (HPI)
Negative Covid.  Right sided sinus pressure w r ear pressure x 3 weeks.  Works in a school.  Exposed to strep.  Denies fever just chills.

## 2024-02-07 NOTE — ED PROVIDER NOTES
Patient Seen in: [facility]      History     Chief Complaint   Patient presents with    Cough/URI     Stated Complaint: Headache  Subjective:   53-year-old female presents for sinus and nasal congestion for the past 3 weeks.  She states it is worse on the right side.  She is also complaining of right ear pain.  No drainage from the ear.  No hearing loss.  No mastoid complaints. She does have a sore throat. No difficulty swallowing. Speech is clear. She has minimal cough and postnasal drip.  She is eating and drinking normally without vomiting or diarrhea.  No fevers.  She has had intermittent sinus pressure and headaches.  No dizziness.  No neck stiffness.  No sick contacts at home.  She appears nontoxic.      Objective:   Past Medical History:   Diagnosis Date    Anxiety     Asthma     Breast cancer (HCC) 05/2021    Left breast    Bronchitis     Ductal carcinoma in situ of breast     Exposure to medical diagnostic radiation     2/24/2022    Hypertension     Migraines     Personal history of antineoplastic chemotherapy     last treatment 12/2021    PONV (postoperative nausea and vomiting)     Visual impairment     glasses for reading            Past Surgical History:   Procedure Laterality Date    COLONOSCOPY      INJECT NERV BLCK,AXILLARY NERV      LUMPECTOMY LEFT Left 05/28/2021    IDC;DCIS    OTHER      sinus surgery x2    REDUCTION OF LARGE BREAST Right 06/2023    TONSILLECTOMY      US BREAST BIOPSY 1 SITE LEFT (CPT=19083) Left 05/2021    DCIS;IDC              Social History     Socioeconomic History    Marital status: Single   Tobacco Use    Smoking status: Never    Smokeless tobacco: Never   Vaping Use    Vaping Use: Never used   Substance and Sexual Activity    Alcohol use: Yes     Comment: couple times per week    Drug use: Never    Sexual activity: Not Currently            Review of Systems   HENT:  Positive for congestion, ear pain, rhinorrhea, sinus pressure, sinus pain and sore  throat.    All other systems reviewed and are negative.      Positive for stated complaint: Headache  Other systems are as noted in HPI.  Constitutional and vital signs reviewed.      All other systems reviewed and negative except as noted above.    Physical Exam     ED Triage Vitals [02/07/24 1207]   BP (!) 132/98   Pulse 84   Resp 18   Temp 98.1 °F (36.7 °C)   Temp src Temporal   SpO2 95 %   O2 Device None (Room air)     Current:BP (!) 130/92   Pulse 84   Temp 98.1 °F (36.7 °C) (Temporal)   Resp 18   Wt 79.4 kg   SpO2 95%   BMI 35.35 kg/m²     Physical Exam  Vitals and nursing note reviewed.   Constitutional:       General: She is not in acute distress.     Appearance: Normal appearance. She is not toxic-appearing.   HENT:      Head: Normocephalic.      Right Ear: Ear canal normal. A middle ear effusion is present. Tympanic membrane is erythematous.      Left Ear: Tympanic membrane and ear canal normal.      Nose: Mucosal edema, congestion and rhinorrhea present. Rhinorrhea is purulent.      Right Sinus: Maxillary sinus tenderness and frontal sinus tenderness present.      Left Sinus: Frontal sinus tenderness present.      Mouth/Throat:      Mouth: Mucous membranes are moist.      Pharynx: Oropharynx is clear. Uvula midline. No oropharyngeal exudate, posterior oropharyngeal erythema or uvula swelling.      Tonsils: No tonsillar exudate or tonsillar abscesses.   Eyes:      Extraocular Movements: Extraocular movements intact.      Conjunctiva/sclera: Conjunctivae normal.      Pupils: Pupils are equal, round, and reactive to light.   Cardiovascular:      Rate and Rhythm: Normal rate and regular rhythm.   Pulmonary:      Effort: Pulmonary effort is normal.      Breath sounds: Normal breath sounds.   Musculoskeletal:         General: Normal range of motion.      Cervical back: Normal range of motion and neck supple.   Lymphadenopathy:      Cervical: No cervical adenopathy.   Skin:     General: Skin is warm and  dry.      Capillary Refill: Capillary refill takes less than 2 seconds.   Neurological:      General: No focal deficit present.      Mental Status: She is alert and oriented to person, place, and time.   Psychiatric:         Mood and Affect: Mood normal.         Behavior: Behavior normal.         ED Course     Labs Reviewed   POCT RAPID STREP - Normal     MDM       Medical Decision Making  I will treat the right otitis media with Augmentin.  This will also cover her a sinusitis.  We discussed supportive care including pushing fluids, rest, Tylenol or Motrin as needed for pain or fever.  She will follow-up with her primary care doctor.    Risk  OTC drugs.  Prescription drug management.  Risk Details: Differential diagnoses are right otitis media versus sinusitis versus an upper respiratory virus.        Disposition and Plan     Clinical Impression:  1. Right otitis media, unspecified otitis media type    2. Sinus congestion         Disposition:  Discharge  2/7/2024 12:37 pm    Follow-up:  Camille Guzman MD  [address] [place] [address]  [phone]    Schedule an appointment as soon as possible for a visit in 3 days            Medications Prescribed:  Current Discharge Medication List        START taking these medications    Details   amoxicillin clavulanate 875-125 MG Oral Tab Take 1 tablet by mouth 2 (two) times daily for 10 days.  Qty: 20 tablet, Refills: 0

## 2024-02-07 NOTE — DISCHARGE INSTRUCTIONS
Tylenol or Motrin as needed for pain or fever.  Take the antibiotics as prescribed.  Follow-up as needed with your doctor.  Return for concerns.

## 2024-02-28 ENCOUNTER — HOSPITAL ENCOUNTER (OUTPATIENT)
Age: 54
Discharge: HOME | End: 2024-02-28
Payer: COMMERCIAL

## 2024-02-28 VITALS
RESPIRATION RATE: 20 BRPM | TEMPERATURE: 98 F | SYSTOLIC BLOOD PRESSURE: 116 MMHG | HEART RATE: 87 BPM | OXYGEN SATURATION: 97 % | DIASTOLIC BLOOD PRESSURE: 96 MMHG

## 2024-02-28 DIAGNOSIS — J01.41 ACUTE RECURRENT PANSINUSITIS: Primary | ICD-10-CM

## 2024-02-28 PROCEDURE — 99213 OFFICE O/P EST LOW 20 MIN: CPT | Performed by: PHYSICIAN ASSISTANT

## 2024-02-28 RX ORDER — DOXYCYCLINE HYCLATE 100 MG/1
100 CAPSULE ORAL 2 TIMES DAILY
Qty: 14 CAPSULE | Refills: 0 | Status: SHIPPED | OUTPATIENT
Start: 2024-02-28 | End: 2024-03-06

## 2024-02-28 NOTE — ED PROVIDER NOTES
Patient Seen in: [facility]      History     Chief Complaint   Patient presents with    Stuffy Nose    Post Nasal Drip     Stated Complaint: sinus issues    Subjective:   HPI    52 YO female presents to immediate care with 2 week history of nasal congestion with green discharge and sinus pain. Patient states she completed Augmentin earlier this month and all symptoms resolved. She states she got sick again with cold symptoms a few weeks ago that mostly resolved, sinus pain has persisted. No fevers. OTC medications have not helped.       Objective:   Past Medical History:   Diagnosis Date    Anxiety     Asthma (HCC)     Breast cancer (HCC) 05/2021    Left breast    Bronchitis     Ductal carcinoma in situ of breast     Exposure to medical diagnostic radiation     2/24/2022    Hypertension     Migraines     Personal history of antineoplastic chemotherapy     last treatment 12/2021    PONV (postoperative nausea and vomiting)     Visual impairment     glasses for reading              Past Surgical History:   Procedure Laterality Date    COLONOSCOPY      INJECT NERV BLCK,AXILLARY NERV      LUMPECTOMY LEFT Left 05/28/2021    IDC;DCIS    OTHER      sinus surgery x2    REDUCTION OF LARGE BREAST Right 06/2023    TONSILLECTOMY      US BREAST BIOPSY 1 SITE LEFT (CPT=19083) Left 05/2021    DCIS;IDC                Social History     Socioeconomic History    Marital status: Single   Tobacco Use    Smoking status: Never    Smokeless tobacco: Never   Vaping Use    Vaping Use: Never used   Substance and Sexual Activity    Alcohol use: Yes     Comment: couple times per week    Drug use: Never    Sexual activity: Not Currently              Review of Systems    Positive for stated complaint: sinus issues  Other systems are as noted in HPI.  Constitutional and vital signs reviewed.      All other systems reviewed and negative except as noted above.    Physical Exam     ED Triage Vitals [02/28/24 1350]   BP (!)  116/96   Pulse 87   Resp 20   Temp 98 °F (36.7 °C)   Temp src Temporal   SpO2 97 %   O2 Device None (Room air)       Current:BP (!) 116/96   Pulse 87   Temp 98 °F (36.7 °C) (Temporal)   Resp 20   SpO2 97%         Physical Exam  Vitals and nursing note reviewed.   Constitutional:       General: She is not in acute distress.     Appearance: Normal appearance. She is not ill-appearing, toxic-appearing or diaphoretic.   HENT:      Nose:      Right Sinus: Maxillary sinus tenderness and frontal sinus tenderness present.      Left Sinus: Maxillary sinus tenderness and frontal sinus tenderness present.   Cardiovascular:      Rate and Rhythm: Normal rate and regular rhythm.   Pulmonary:      Effort: Pulmonary effort is normal. No respiratory distress.      Breath sounds: Normal breath sounds.   Neurological:      Mental Status: She is alert and oriented to person, place, and time.   Psychiatric:         Mood and Affect: Mood normal.         Behavior: Behavior normal.           ED Course   Labs Reviewed - No data to display      MDM      This is a 52 YO female that presents to immediate care with 2 week history of nasal congestion with green discharge and sinus pain.    Differential diagnosis considered but not limited to viral syndrome, viral or bacterial sinusitis    Facial tenderness on exam. Considering duration of sinusitis symptoms, will prescribe Doxycycline to cover possible bacterial component.        Medical Decision Making  Risk  Prescription drug management.        Disposition and Plan     Clinical Impression:  1. Acute recurrent pansinusitis         Disposition:  Discharge  2/28/2024  2:14 pm    Follow-up:  Camille Guzman MD  [address] [place] [id number]  [phone]      As needed          Medications Prescribed:  Discharge Medication List as of 2/28/2024  2:18 PM        START taking these medications    Details   doxycycline 100 MG Oral Cap Take 1 capsule (100 mg total) by mouth 2  (two) times daily for 7 days., Print, Disp-14 capsule, R-0

## 2024-03-20 ENCOUNTER — APPOINTMENT (OUTPATIENT)
Dept: GENERAL RADIOLOGY | Age: 54
End: 2024-03-20
Attending: NURSE PRACTITIONER
Payer: COMMERCIAL

## 2024-03-20 ENCOUNTER — HOSPITAL ENCOUNTER (OUTPATIENT)
Age: 54
Discharge: HOME | End: 2024-03-20
Payer: COMMERCIAL

## 2024-03-20 VITALS
BODY MASS INDEX: 33.38 KG/M2 | RESPIRATION RATE: 18 BRPM | TEMPERATURE: 99 F | OXYGEN SATURATION: 100 % | HEIGHT: 60 IN | WEIGHT: 170 LBS | DIASTOLIC BLOOD PRESSURE: 88 MMHG | SYSTOLIC BLOOD PRESSURE: 122 MMHG | HEART RATE: 88 BPM

## 2024-03-20 DIAGNOSIS — R05.1 ACUTE COUGH: Primary | ICD-10-CM

## 2024-03-20 DIAGNOSIS — J40 BRONCHITIS: ICD-10-CM

## 2024-03-20 PROCEDURE — 71046 X-RAY EXAM CHEST 2 VIEWS: CPT | Performed by: NURSE PRACTITIONER

## 2024-03-20 PROCEDURE — 99213 OFFICE O/P EST LOW 20 MIN: CPT | Performed by: NURSE PRACTITIONER

## 2024-03-20 RX ORDER — DOXYCYCLINE HYCLATE 100 MG/1
100 CAPSULE ORAL 2 TIMES DAILY
Qty: 14 CAPSULE | Refills: 0 | Status: SHIPPED | OUTPATIENT
Start: 2024-03-20 | End: 2024-03-27

## 2024-03-20 RX ORDER — BENZONATATE 100 MG/1
100 CAPSULE ORAL 3 TIMES DAILY PRN
Qty: 30 CAPSULE | Refills: 0 | Status: SHIPPED | OUTPATIENT
Start: 2024-03-20 | End: 2024-04-19

## 2024-03-20 RX ORDER — ALBUTEROL SULFATE 90 UG/1
2 AEROSOL, METERED RESPIRATORY (INHALATION) EVERY 4 HOURS PRN
Qty: 1 EACH | Refills: 0 | Status: SHIPPED | OUTPATIENT
Start: 2024-03-20 | End: 2024-04-19

## 2024-03-20 RX ORDER — METHYLPREDNISOLONE 4 MG/1
TABLET ORAL
Qty: 1 EACH | Refills: 0 | Status: SHIPPED | OUTPATIENT
Start: 2024-03-20

## 2024-03-20 NOTE — DISCHARGE INSTRUCTIONS
Increase water intake 2-3 liters daily   Do not fill the doxycycline/take the doxycycline unless you develop a fever while you are out of the country.  Cough that is associated with bronchitis can last several weeks to several months.  Please follow-up closely with your primary care provider.

## 2024-03-20 NOTE — ED PROVIDER NOTES
Patient Seen in: [facility]      History     Chief Complaint   Patient presents with    Cough/URI     Stated Complaint: Congestion    Subjective:   HPI    54-year-old female with history of anxiety, asthma, breast cancer, bronchitis, hypertension presents today with complaints of continued cough for about 1 month.  Patient states she was treated for a sinusitis the end of February and was on doxycycline for 10 days.  Patient states her symptoms have continued and she still experiencing some congestion.  Patient states that she has been coughing more frequently and coughed so hard yesterday that she vomited.  Patient denies any shortness of breath.  Patient states that she will be traveling to [facility] and is concerned about traveling sick.    Objective:   Past Medical History:   Diagnosis Date    Anxiety     Asthma (HCC)     Breast cancer (HCC) 05/2021    Left breast    Bronchitis     Ductal carcinoma in situ of breast     Exposure to medical diagnostic radiation     2/24/2022    Hypertension     Migraines     Personal history of antineoplastic chemotherapy     last treatment 12/2021    PONV (postoperative nausea and vomiting)     Visual impairment     glasses for reading              Past Surgical History:   Procedure Laterality Date    COLONOSCOPY      INJECT NERV BLCK,AXILLARY NERV      LUMPECTOMY LEFT Left 05/28/2021    IDC;DCIS    OTHER      sinus surgery x2    REDUCTION OF LARGE BREAST Right 06/2023    TONSILLECTOMY      US BREAST BIOPSY 1 SITE LEFT (CPT=19083) Left 05/2021    DCIS;IDC                Social History     Socioeconomic History    Marital status: Single   Tobacco Use    Smoking status: Never    Smokeless tobacco: Never   Vaping Use    Vaping Use: Never used   Substance and Sexual Activity    Alcohol use: Yes     Comment: couple times per week    Drug use: Never    Sexual activity: Not Currently              Review of Systems   Constitutional: Negative.    HENT:  Positive for  congestion, postnasal drip and sinus pressure.    Eyes: Negative.    Respiratory:  Positive for cough.    Cardiovascular: Negative.    Gastrointestinal: Negative.    Endocrine: Negative.    Genitourinary: Negative.    Musculoskeletal: Negative.    Skin: Negative.    Allergic/Immunologic: Negative.    Neurological: Negative.    Hematological: Negative.    Psychiatric/Behavioral: Negative.         Positive for stated complaint: Congestion  Other systems are as noted in HPI.  Constitutional and vital signs reviewed.      All other systems reviewed and negative except as noted above.    Physical Exam     ED Triage Vitals [03/20/24 1305]   /88   Pulse 88   Resp 18   Temp 98.7 °F (37.1 °C)   Temp src Temporal   SpO2 100 %   O2 Device None (Room air)       Current:/88   Pulse 88   Temp 98.7 °F (37.1 °C) (Temporal)   Resp 18   Ht 152.4 cm (5')   Wt 77.1 kg   SpO2 100%   BMI 33.20 kg/m²         Physical Exam  Vitals and nursing note reviewed.   Constitutional:       Appearance: Normal appearance.   HENT:      Head: Normocephalic.      Right Ear: Tympanic membrane, ear canal and external ear normal.      Left Ear: Tympanic membrane, ear canal and external ear normal.      Nose: Nose normal.      Mouth/Throat:      Mouth: Mucous membranes are moist.      Pharynx: Oropharynx is clear.   Eyes:      Extraocular Movements: Extraocular movements intact.      Conjunctiva/sclera: Conjunctivae normal.      Pupils: Pupils are equal, round, and reactive to light.   Cardiovascular:      Rate and Rhythm: Normal rate and regular rhythm.      Pulses: Normal pulses.   Pulmonary:      Effort: Pulmonary effort is normal.      Breath sounds: Wheezing present.   Musculoskeletal:      Cervical back: Normal range of motion and neck supple.   Skin:     General: Skin is warm.   Neurological:      General: No focal deficit present.      Mental Status: She is alert.   Psychiatric:         Mood and Affect: Mood normal.             ED  Course   Labs Reviewed - No data to display                   MDM      54-year-old female with history of anxiety, asthma, breast cancer, bronchitis, hypertension presents today with complaints of continued cough for about 1 month.  Patient states she was treated for a sinusitis the end of February and was on doxycycline for 10 days.  Patient states her symptoms have continued and she still experiencing some congestion.  Patient states that she has been coughing more frequently and coughed so hard yesterday that she vomited.  Patient denies any shortness of breath.  Patient states that she will be traveling to [facility] and is concerned about traveling sick.  Vital signs: Please see EMR.  Physical exam: Please see exam.  Differential diagnosis: Bronchitis, pneumonia, URI.  XR CHEST PA + LAT CHEST (CPT=71046)    Result Date: 3/20/2024  CONCLUSION:  Peribronchial thickening with mild hyperinflation could be related to bronchitis and/or asthma. Clinical correlation recommended.  LOCATION:  [facility]   Dictated by (CST): Simeon Siegel MD on 3/20/2024 at 1:45 PM     Finalized by (CST): Simeon Siegel MD on 3/20/2024 at 1:45 PM      Will diagnose with bronchitis.  Will treat with steroid, Tessalon and ProAir.  Will print a prescription for doxycycline as patient is leaving the country.  Instructed patient not to fill this prescription or take this prescription unless she develops a fever associated with the cough.  Patient instructed to increase her water intake 2 to 3 L a day.  ED precautions given.  Note to Patient  The 21st Century Cures Act makes medical notes like these available to patients in the interest of transparency. However, be advised this is a medical document and is intended as [id number] communication; it is written in medical language and may appear blunt, direct, or contain abbreviations or verbiage that are unfamiliar. Medical documents are intended to carry relevant information, facts as evident, and  the clinical opinion of the practitioner.     This report has been produced using speech recognition software, and may contain errors related to grammar, punctuation, spelling, words or phrases unrecognized or not translated appropriately to text; these errors may be referred to the dictating provider for further clarification and/or addendum as needed.                                     Medical Decision Making  54-year-old female with history of anxiety, asthma, breast cancer, bronchitis, hypertension presents today with complaints of continued cough for about 1 month.  Patient states she was treated for a sinusitis the end of February and was on doxycycline for 10 days.  Patient states her symptoms have continued and she still experiencing some congestion.  Patient states that she has been coughing more frequently and coughed so hard yesterday that she vomited.  Patient denies any shortness of breath.  Patient states that she will be traveling to [facility] and is concerned about traveling sick.    Problems Addressed:  Acute cough: acute illness or injury  Bronchitis: acute illness or injury    Amount and/or Complexity of Data Reviewed  External Data Reviewed: notes.  Radiology: ordered. Decision-making details documented in ED Course.    Risk  OTC drugs.  Prescription drug management.        Disposition and Plan     Clinical Impression:  1. Acute cough    2. Bronchitis         Disposition:  Discharge  3/20/2024  1:55 pm    Follow-up:  Camille Guzman MD  [address] [place] [address]  [phone]    In 1 week  As needed          Medications Prescribed:  Current Discharge Medication List        START taking these medications    Details   benzonatate 100 MG Oral Cap Take 1 capsule (100 mg total) by mouth 3 (three) times daily as needed for cough.  Qty: 30 capsule, Refills: 0      methylPREDNISolone (MEDROL) 4 MG Oral Tablet Therapy Pack Dosepack: take as directed  Qty: 1 each, Refills: 0      !!  albuterol 108 (90 Base) MCG/ACT Inhalation Aero Soln Inhale 2 puffs into the lungs every 4 (four) hours as needed for Wheezing.  Qty: 1 each, Refills: 0       !! - Potential duplicate medications found. Please discuss with provider.

## 2024-04-20 ENCOUNTER — HOSPITAL ENCOUNTER (OUTPATIENT)
Dept: BONE DENSITY | Age: 54
Discharge: HOME | End: 2024-04-20
Attending: INTERNAL MEDICINE
Payer: COMMERCIAL

## 2024-04-20 DIAGNOSIS — Z79.811 AROMATASE INHIBITOR USE: ICD-10-CM

## 2024-04-20 DIAGNOSIS — Z78.0 POSTMENOPAUSAL: ICD-10-CM

## 2024-04-20 PROCEDURE — 77080 DXA BONE DENSITY AXIAL: CPT | Performed by: INTERNAL MEDICINE

## 2024-05-02 DIAGNOSIS — I10 ESSENTIAL HYPERTENSION: ICD-10-CM

## 2024-05-02 RX ORDER — LOSARTAN POTASSIUM AND HYDROCHLOROTHIAZIDE 12.5; 5 MG/1; MG/1
1 TABLET ORAL DAILY
Qty: 90 TABLET | Refills: 0 | Status: SHIPPED | OUTPATIENT
Start: 2024-05-02 | End: 2025-04-27

## 2024-05-16 NOTE — PROGRESS NOTES
[facility] Progress Note      Patient Name:  Makeda Garduno  YOB: 1970  Medical Record Number:  [id number]    Date of visit: 5/17/2024      CHIEF COMPLAINT: L breast ca s/p lumpectomy.    HPI:     54 year old that I have followed since  7/21. S/p L lumpectomy/ALND 6/21.  Path-4 cm grade 2 IDC, 7+ LN.  Invasion into perinodal adipose tissue with extensive LVI.  No distant disease on staging.  Received DD AC-weekly  T from 7/28/21- 12/10/21. Completed RT 2/24/22.  Anastrozole started 3/22. Abemaciclib started 4/22., stopped 4/23.  Presents for evaluation.    Diarrhea has resolved.  Underwent breast reduction on contralateral side.  Energy level is good.  No new areas of bone pain.  Left arm lymphedema is reasonably well managed.    Just got back from  [facility]. L arm lymphedema surgery planned. LD flap planned.       SOCIAL HISTORY:    Single, elementary school .  No children.    ROS:     Constitutional: Fatigue, mild nausea.  Neurologic: no headache, seizures, diplopia or weakness  Respiratory: no cough, SOB or hemoptysis  Cardiovascular: no chest pain, ankle swelling, JULIAN  GI: no nausea, vomiting, diarrhea or BRBPR  Musculoskeletal: no body-ache or joint pain  Dermatologic: Rash on neck  : no hematuria, dysuria or frequency  Psych: no confusion or depression   Heme: no easy bruising or bleeding     ALLERGIES:    Allergies   Allergen Reactions    B Complex-C [B-Plex] FACE FLUSHING    Benzoyl Peroxide SWELLING and OTHER (SEE COMMENTS)     Eyes swell shut, skin oozes    Erythromycin NAUSEA AND VOMITING    Dust Mites UNKNOWN     .    Mold UNKNOWN     .       MEDICATIONS:    Current Outpatient Medications   Medication Sig Dispense Refill    losartan-hydroCHLOROthiazide 50-12.5 MG Oral Tab TAKE 1 TABLET BY MOUTH DAILY 90 tablet 0    methylPREDNISolone (MEDROL) 4 MG Oral Tablet Therapy Pack Dosepack: take as directed 1 each 0    anastrozole 1 MG Oral Tab tab TAKE 1  TABLET(1 MG) BY MOUTH DAILY 90 tablet 1    ALPRAZolam 0.25 MG Oral Tab Take 1 tablet (0.25 mg total) by mouth nightly as needed for Anxiety. 10 tablet 1    albuterol (PROAIR HFA) 108 (90 Base) MCG/ACT Inhalation Aero Soln Inhale 2 puffs into the lungs every 4 (four) hours as needed for Wheezing or Shortness of Breath (cough). 1 each 1    mupirocin 2 % External Ointment Apply 1 Application topically 2 (two) times daily. 22 g 1    HYDROcodone-acetaminophen 5-325 MG Oral Tab Take 1-2 tablets by mouth every 6 (six) hours as needed for Pain. 40 tablet 0    docusate sodium 100 MG Oral Cap Take 1 capsule (100 mg total) by mouth 2 (two) times daily. 20 capsule 1    Nutritional Supplements (RADHA NUTRIVIGOR) Oral Powd Pack Take 1 Package by mouth daily.      ondansetron (ZOFRAN) 8 MG tablet Take 1 tablet (8 mg total) by mouth every 8 (eight) hours as needed for Nausea. 30 tablet 3    Cetirizine HCl (ZYRTEC ALLERGY) 10 MG Oral Cap Take 1 capsule by mouth daily.      Beclomethasone Diprop HFA (QVAR REDIHALER) 80 MCG/ACT Inhalation Aerosol, Breath Activated Inhale 2 puffs into the lungs 2 (two) times daily.      Fluticasone Propionate 50 MCG/ACT Nasal Suspension 2 sprays by Nasal route daily.         VITALS: Height: --  Weight: 77.6 kg (171 lb) ( 1010)  BSA (Calculated - sq m): --  Pulse: 92 ( 1010)  BP: 131/94 ( 1010)  Temp: 97.7 °F (36.5 °C) ( 1010)  Do Not Use - Resp Rate: --  SpO2: 96 % ( 1010)      PS:  ECO    PHYSICAL EXAM:    General: alert and oriented x 3, not in acute distress.  HEENT: JUDY, oropharynx  Clear. Pallor.   Neck: supple.  No JVD /adenopathy.  CVS: S1S2, regular  Rhythm, no murmurs.   Lungs: Clear to auscultation and percussion.  Abdomen: Soft, non tender, no hepato-splenomegaly.  Extremities:  No edema.  CNS: no focal deficit  Breast exam: Right breast is soft, no masses.  Left breast shows well-healed lumpectomy scar.  No masses or axillary adenopathy.      Emotional well  being: Patient's emotional well being was assessed.  No issues requiring acute psychosocial intervention were identified.     LABS:     Recent Results (from the past 24 hour(s))   Comp Metabolic Panel (14)    Collection Time: 05/17/24 10:10 AM   Result Value Ref Range    Glucose 105 (H) 70 - 99 mg/dL    Sodium 135 (L) 136 - 145 mmol/L    Potassium 4.1 3.5 - 5.1 mmol/L    Chloride 104 98 - 112 mmol/L    CO2 26.0 21.0 - 32.0 mmol/L    Anion Gap 5 0 - 18 mmol/L    BUN 14 9 - 23 mg/dL    Creatinine 0.91 0.55 - 1.02 mg/dL    Calcium, Total 9.3 8.5 - 10.1 mg/dL    Calculated Osmolality 281 275 - 295 mOsm/kg    eGFR-Cr 74 >=60 mL/min/1.73m2    AST 15 15 - 37 U/L    ALT 24 13 - 56 U/L    Alkaline Phosphatase 62 46 - 118 U/L    Bilirubin, Total 0.9 0.1 - 2.0 mg/dL    Total Protein 7.0 6.4 - 8.2 g/dL    Albumin 3.7 3.4 - 5.0 g/dL    Globulin  3.3 2.8 - 4.4 g/dL    A/G Ratio 1.1 1.0 - 2.0    Patient Fasting for CMP? No    CBC W/ DIFFERENTIAL    Collection Time: 05/17/24 10:10 AM   Result Value Ref Range    WBC 5.8 4.0 - 11.0 x10(3) uL    RBC 3.92 3.80 - 5.30 x10(6)uL    HGB 12.5 12.0 - 16.0 g/dL    HCT 36.3 35.0 - 48.0 %    .0 150.0 - 450.0 10(3)uL    MCV 92.6 80.0 - 100.0 fL    MCH 31.9 26.0 - 34.0 pg    MCHC 34.4 31.0 - 37.0 g/dL    RDW 12.6 %    Neutrophil Absolute Prelim 3.34 1.50 - 7.70 x10 (3) uL    Neutrophil Absolute 3.34 1.50 - 7.70 x10(3) uL    Lymphocyte Absolute 1.71 1.00 - 4.00 x10(3) uL    Monocyte Absolute 0.51 0.10 - 1.00 x10(3) uL    Eosinophil Absolute 0.24 0.00 - 0.70 x10(3) uL    Basophil Absolute 0.03 0.00 - 0.20 x10(3) uL    Immature Granulocyte Absolute 0.01 0.00 - 1.00 x10(3) uL    Neutrophil % 57.2 %    Lymphocyte % 29.3 %    Monocyte % 8.7 %    Eosinophil % 4.1 %    Basophil % 0.5 %    Immature Granulocyte % 0.2 %   Vitamin D, 25-Hydroxy    Collection Time: 05/17/24 10:10 AM   Result Value Ref Range    Vitamin D, 25OH, Total 24.2 (L) 30.0 - 100.0 ng/mL           ASSESSMENT AND PLAN:     # L  breast ca  (T2 N2 Mx):  s/p L lumpectomy/ALND 6/21 4 cm gr 2 IDC, 7+ LN, invasion into perinodal adipose tissue with extensive LVI, 100% ER, 80% KS, HER-2 neg, Ki-67 40%.      DD AC-weekly Paclitaxel from 7/21-12/21 and RT 2/22.    Anastrozole started 3/22, continue till 3/32.   Abemaciclib started 4/22-4/23, discontinued for diarrhea. Started adjuvant zoledronic acid 5/22. Continue q 6 months x 5 years per ASCO guidelines. ONJ risk discussed.  Up-to-date on dentist visit.      DEXA 4/22 was normal, repeat 4/24.  CT and bone scan 5/22-NELDA.  Nonspecific groundglass density left lung apex probably due to RT.  Port-A-Cath removed 7/22.     L mammogram 12/23 okay, due for bilateral mammogram 6/24.  Breast MRI 12/23 okay.    # Vit D defi: May use OTC.    # Hyponatremia: Mild and stable.  Noted since 2022 but more seen this year.  Possibly due to HCTZ which she takes with losartan.  Follow for now.    # Liver lesion : noted on CT 12/21-8 mm, also noted on CT outside facility 6/21. Repeat CT 5/22-unchanged.       ORDERS PLACED:    Return in about 6 months (around 11/17/2024) for Labs, MD visit, Infusion.    Francesca Mayorga M.D.    [facility]    [facility]  [address] [place], [address]    5/17/2024

## 2024-05-17 ENCOUNTER — OFFICE VISIT (OUTPATIENT)
Dept: HEMATOLOGY/ONCOLOGY | Facility: HOSPITAL | Age: 54
End: 2024-05-17
Attending: INTERNAL MEDICINE

## 2024-05-17 VITALS
SYSTOLIC BLOOD PRESSURE: 131 MMHG | HEART RATE: 92 BPM | OXYGEN SATURATION: 96 % | WEIGHT: 171 LBS | DIASTOLIC BLOOD PRESSURE: 94 MMHG | TEMPERATURE: 98 F | BODY MASS INDEX: 33 KG/M2

## 2024-05-17 DIAGNOSIS — Z17.0 MALIGNANT NEOPLASM OF OVERLAPPING SITES OF LEFT BREAST IN FEMALE, ESTROGEN RECEPTOR POSITIVE (HCC): Primary | ICD-10-CM

## 2024-05-17 DIAGNOSIS — C50.812 MALIGNANT NEOPLASM OF OVERLAPPING SITES OF LEFT BREAST IN FEMALE, ESTROGEN RECEPTOR POSITIVE (HCC): Primary | ICD-10-CM

## 2024-05-17 DIAGNOSIS — C77.9 REGIONAL LYMPH NODE METASTASIS PRESENT (HCC): ICD-10-CM

## 2024-05-17 DIAGNOSIS — Z79.811 AROMATASE INHIBITOR USE: ICD-10-CM

## 2024-05-17 DIAGNOSIS — C50.812 MALIGNANT NEOPLASM OF OVERLAPPING SITES OF LEFT BREAST IN FEMALE, ESTROGEN RECEPTOR POSITIVE (HCC): ICD-10-CM

## 2024-05-17 DIAGNOSIS — Z17.0 MALIGNANT NEOPLASM OF OVERLAPPING SITES OF LEFT BREAST IN FEMALE, ESTROGEN RECEPTOR POSITIVE (HCC): ICD-10-CM

## 2024-05-17 DIAGNOSIS — Z78.0 POSTMENOPAUSAL: Primary | ICD-10-CM

## 2024-05-17 DIAGNOSIS — T50.905D ADVERSE EFFECT OF DRUG, SUBSEQUENT ENCOUNTER: ICD-10-CM

## 2024-05-17 LAB
ALBUMIN SERPL-MCNC: 3.7 G/DL (ref 3.4–5)
ALBUMIN/GLOB SERPL: 1.1 {RATIO} (ref 1–2)
ALP LIVER SERPL-CCNC: 62 U/L
ALT SERPL-CCNC: 24 U/L
ANION GAP SERPL CALC-SCNC: 5 MMOL/L (ref 0–18)
AST SERPL-CCNC: 15 U/L (ref 15–37)
BASOPHILS # BLD AUTO: 0.03 X10(3) UL (ref 0–0.2)
BASOPHILS NFR BLD AUTO: 0.5 %
BILIRUB SERPL-MCNC: 0.9 MG/DL (ref 0.1–2)
BUN BLD-MCNC: 14 MG/DL (ref 9–23)
CALCIUM BLD-MCNC: 9.3 MG/DL (ref 8.5–10.1)
CHLORIDE SERPL-SCNC: 104 MMOL/L (ref 98–112)
CO2 SERPL-SCNC: 26 MMOL/L (ref 21–32)
CREAT BLD-MCNC: 0.91 MG/DL
EGFRCR SERPLBLD CKD-EPI 2021: 74 ML/MIN/1.73M2 (ref 60–?)
EOSINOPHIL # BLD AUTO: 0.24 X10(3) UL (ref 0–0.7)
EOSINOPHIL NFR BLD AUTO: 4.1 %
ERYTHROCYTE [DISTWIDTH] IN BLOOD BY AUTOMATED COUNT: 12.6 %
FASTING STATUS PATIENT QL REPORTED: NO
GLOBULIN PLAS-MCNC: 3.3 G/DL (ref 2.8–4.4)
GLUCOSE BLD-MCNC: 105 MG/DL (ref 70–99)
HCT VFR BLD AUTO: 36.3 %
HGB BLD-MCNC: 12.5 G/DL
IMM GRANULOCYTES # BLD AUTO: 0.01 X10(3) UL (ref 0–1)
IMM GRANULOCYTES NFR BLD: 0.2 %
LYMPHOCYTES # BLD AUTO: 1.71 X10(3) UL (ref 1–4)
LYMPHOCYTES NFR BLD AUTO: 29.3 %
MCH RBC QN AUTO: 31.9 PG (ref 26–34)
MCHC RBC AUTO-ENTMCNC: 34.4 G/DL (ref 31–37)
MCV RBC AUTO: 92.6 FL
MONOCYTES # BLD AUTO: 0.51 X10(3) UL (ref 0.1–1)
MONOCYTES NFR BLD AUTO: 8.7 %
NEUTROPHILS # BLD AUTO: 3.34 X10 (3) UL (ref 1.5–7.7)
NEUTROPHILS # BLD AUTO: 3.34 X10(3) UL (ref 1.5–7.7)
NEUTROPHILS NFR BLD AUTO: 57.2 %
OSMOLALITY SERPL CALC.SUM OF ELEC: 281 MOSM/KG (ref 275–295)
PLATELET # BLD AUTO: 233 10(3)UL (ref 150–450)
POTASSIUM SERPL-SCNC: 4.1 MMOL/L (ref 3.5–5.1)
PROT SERPL-MCNC: 7 G/DL (ref 6.4–8.2)
RBC # BLD AUTO: 3.92 X10(6)UL
SODIUM SERPL-SCNC: 135 MMOL/L (ref 136–145)
VIT D+METAB SERPL-MCNC: 24.2 NG/ML (ref 30–100)
WBC # BLD AUTO: 5.8 X10(3) UL (ref 4–11)

## 2024-05-17 PROCEDURE — 96374 THER/PROPH/DIAG INJ IV PUSH: CPT

## 2024-05-17 PROCEDURE — 99214 OFFICE O/P EST MOD 30 MIN: CPT | Performed by: INTERNAL MEDICINE

## 2024-05-17 NOTE — PROGRESS NOTES
Pt arrived amb for Zometa infusion.  Labs met criteria for infusion.  Given per MD order.  Pt pao well.  Scheduled for next infusion (11/15/24).  Pt left amb without c/o.

## 2024-05-17 NOTE — PROGRESS NOTES
Patient here for 6 month f/u. Patient taking anastrozole as directed with no complaints. Patient has no current complaints at this time.     Education Record    Learner:  Patient    Disease / Diagnosis: malignant neoplasm of left breast    Barriers / Limitations:  None   Comments:    Method:  Discussion   Comments:    General Topics:  Medication, Side effects and symptom management, and Plan of care reviewed   Comments:    Outcome:  Shows understanding   Comments:

## 2024-05-24 ENCOUNTER — TELEPHONE (OUTPATIENT)
Dept: HEMATOLOGY/ONCOLOGY | Facility: HOSPITAL | Age: 54
End: 2024-05-24

## 2024-05-24 NOTE — TELEPHONE ENCOUNTER
Received call from patient looking for help with scheduling follow up with Dr. Miller.  Says she is having her mammogram in June and should see Dr. Miller after that, and should then have an MRI in December.  She is having surgery this summer at [facility] for latissimus dorsi flap for lymphedema, that will re distribute vessels and nodes to reduce her lymphedema.  The surgery has nothing to do with breast and should not disrupt her 6 month alternating image schedules.  Will coordinate with Dr. Miller's office and call her back next week with a plan.

## 2024-06-04 ENCOUNTER — HOSPITAL ENCOUNTER (OUTPATIENT)
Dept: MAMMOGRAPHY | Facility: HOSPITAL | Age: 54
Discharge: HOME | End: 2024-06-04
Attending: INTERNAL MEDICINE
Payer: COMMERCIAL

## 2024-06-04 DIAGNOSIS — Z17.0 MALIGNANT NEOPLASM OF OVERLAPPING SITES OF LEFT BREAST IN FEMALE, ESTROGEN RECEPTOR POSITIVE (HCC): ICD-10-CM

## 2024-06-04 DIAGNOSIS — C50.812 MALIGNANT NEOPLASM OF OVERLAPPING SITES OF LEFT BREAST IN FEMALE, ESTROGEN RECEPTOR POSITIVE (HCC): ICD-10-CM

## 2024-06-04 PROCEDURE — 77062 BREAST TOMOSYNTHESIS BI: CPT | Performed by: INTERNAL MEDICINE

## 2024-06-04 PROCEDURE — 77066 DX MAMMO INCL CAD BI: CPT | Performed by: INTERNAL MEDICINE

## 2024-06-25 ENCOUNTER — OFFICE VISIT (OUTPATIENT)
Dept: SURGERY | Facility: CLINIC | Age: 54
End: 2024-06-25

## 2024-06-25 VITALS
RESPIRATION RATE: 18 BRPM | WEIGHT: 171.19 LBS | BODY MASS INDEX: 33 KG/M2 | DIASTOLIC BLOOD PRESSURE: 90 MMHG | OXYGEN SATURATION: 98 % | TEMPERATURE: 98 F | SYSTOLIC BLOOD PRESSURE: 133 MMHG | HEART RATE: 75 BPM

## 2024-06-25 DIAGNOSIS — C50.812 CARCINOMA OF OVERLAPPING SITES OF LEFT BREAST IN FEMALE, ESTROGEN RECEPTOR POSITIVE (HCC): Primary | ICD-10-CM

## 2024-06-25 DIAGNOSIS — Z17.0 CARCINOMA OF OVERLAPPING SITES OF LEFT BREAST IN FEMALE, ESTROGEN RECEPTOR POSITIVE (HCC): Primary | ICD-10-CM

## 2024-06-25 PROCEDURE — 3075F SYST BP GE 130 - 139MM HG: CPT | Performed by: SURGERY

## 2024-06-25 PROCEDURE — 3080F DIAST BP >= 90 MM HG: CPT | Performed by: SURGERY

## 2024-06-25 PROCEDURE — 99213 OFFICE O/P EST LOW 20 MIN: CPT | Performed by: SURGERY

## 2024-06-28 PROBLEM — C50.812 CARCINOMA OF OVERLAPPING SITES OF LEFT BREAST IN FEMALE, ESTROGEN RECEPTOR POSITIVE (HCC): Status: ACTIVE | Noted: 2024-06-28

## 2024-06-28 PROBLEM — Z17.0 CARCINOMA OF OVERLAPPING SITES OF LEFT BREAST IN FEMALE, ESTROGEN RECEPTOR POSITIVE (HCC): Status: ACTIVE | Noted: 2024-06-28

## 2024-06-28 NOTE — PROGRESS NOTES
Breast Surgery Surveillance    History of Present Illness:   Ms. Makeda Garduno is a 54 year old woman who presents with personal history of a prior left breast cancer.  This was taken care of approximately 2 years ago and she underwent a lumpectomy with removal of 8 lymph nodes (7 reportedly positive), chemotherapy and radiation.  She remains in occupational therapy for lymphedema management and has had an asymmetry that has been bothersome in her right breast that is planned to undergo balancing in June 2023 with plastic surgery.  She has a personal history of cysts in the breast remotely prior to this and she does have a family history of breast cancer but underwent genetic mutation testing that was unremarkable at the time of her diagnosis.  Acutely, she denies any palpable masses, nipple discharge, skin changes or concerns.  Her most recent left breast mammogram was December 15, 2022 and showed scattered densities with no concerning findings.  She had an MRI for high risk surveillance in December 2022 that demonstrated no obvious concerns with an area of what appeared to be fat necrosis in the left breast for which surveillance was recommended.  She has continued to have symptoms related to left upper extremity lymphedema.  Her most recent MRI was in December 2023 and did not demonstrate any suspicious findings.  She had a bilateral diagnostic evaluation on June 4, 2024 that showed a group of calcifications new in the right breast thought to represent fat necrosis for which short-term surveillance was recommended.  She is scheduled to have a lymphovenous anastomosis to reconcile her lymphedema of the left upper extremity tomorrow at the [facility].  She is here today for evaluation and recommendations for further therapy.        Past Medical History:    Anxiety    Asthma (HCC)    Breast cancer (HCC)    Left breast    Bronchitis    Ductal carcinoma in situ of breast    Exposure to medical  diagnostic radiation    2022    Hypertension    Migraines    Personal history of antineoplastic chemotherapy    last treatment 2021    PONV (postoperative nausea and vomiting)    Visual impairment    glasses for reading       Past Surgical History:   Procedure Laterality Date    Chemotherapy      Colonoscopy      Inject nerv blck,axillary nerv      Lumpectomy left Left 2021    IDC;DCIS    Other      sinus surgery x2    Radiation left      Reduction of large breast Right 2023    Reduction right          Tonsillectomy      Us breast biopsy 1 site left (cpt=19083) Left 2021    DCIS;IDC       Gynecological History:  Pt is a   She achieved menarche at age 9 and LMP 3 years ago  Age of Menopause: 50  Type: natural  She denies any history of hormone replacement therapy.  She has history of oral contraceptive use for 10 years, last 23 years ago.  She denies infertility treatment to achieve pregnancy.    Medications:     losartan-hydroCHLOROthiazide 50-12.5 MG Oral Tab TAKE 1 TABLET BY MOUTH DAILY 90 tablet 0    methylPREDNISolone (MEDROL) 4 MG Oral Tablet Therapy Pack Dosepack: take as directed 1 each 0    anastrozole 1 MG Oral Tab tab TAKE 1 TABLET(1 MG) BY MOUTH DAILY 90 tablet 1    ALPRAZolam 0.25 MG Oral Tab Take 1 tablet (0.25 mg total) by mouth nightly as needed for Anxiety. 10 tablet 1    albuterol (PROAIR HFA) 108 (90 Base) MCG/ACT Inhalation Aero Soln Inhale 2 puffs into the lungs every 4 (four) hours as needed for Wheezing or Shortness of Breath (cough). 1 each 1    mupirocin 2 % External Ointment Apply 1 Application topically 2 (two) times daily. 22 g 1    HYDROcodone-acetaminophen 5-325 MG Oral Tab Take 1-2 tablets by mouth every 6 (six) hours as needed for Pain. 40 tablet 0    docusate sodium 100 MG Oral Cap Take 1 capsule (100 mg total) by mouth 2 (two) times daily. 20 capsule 1    Nutritional Supplements (RADHA NUTRIVIGOR) Oral Powd Pack Take 1 Package by mouth daily.       ondansetron (ZOFRAN) 8 MG tablet Take 1 tablet (8 mg total) by mouth every 8 (eight) hours as needed for Nausea. 30 tablet 3    Cetirizine HCl (ZYRTEC ALLERGY) 10 MG Oral Cap Take 1 capsule by mouth daily.      Fluticasone Propionate 50 MCG/ACT Nasal Suspension 2 sprays by Nasal route daily.         Allergies:    Allergies   Allergen Reactions    B Complex-C [B-Plex] FACE FLUSHING    Benzoyl Peroxide SWELLING and OTHER (SEE COMMENTS)     Eyes swell shut, skin oozes    Erythromycin NAUSEA AND VOMITING    Dust Mites UNKNOWN     .    Mold UNKNOWN     .       Family History:   Family History   Problem Relation Age of Onset    DCIS Self 54    Breast Cancer Self 52    Breast Cancer Mother 40    Hypertension Father     Cancer Brother         skin     Breast Cancer Maternal Grandmother 57    Cancer Paternal Grandmother 80        stomach     Cancer Maternal Uncle         prostate    Cancer Paternal Uncle         prostate    Breast Cancer Maternal Cousin Female 58       She is not of Ashkenazi Taoism ancestry.    Social History:  History   Alcohol Use    Yes     Comment: couple times per week       History   Smoking Status    Never   Smokeless Tobacco    Never     Ms. Makeda Garduno is Single with 0 children. She has 2 siblings. She is currently Employed full-time      Review of Systems:  General:   The patient denies, fever, chills, night sweats, +fatigue, generalized weakness, change in appetite or weight loss.    HEENT:     The patient denies eye irritation, cataracts, redness, glaucoma, yellowing of the eyes, change in vision or color blindness. The patient denies hearing loss, ringing in the ears, ear drainage, earaches, +nasal congestion, nose bleeds, snoring, pain in mouth/throat, hoarseness, change in voice, facial trauma. +contacts/glasses    Respiratory:  The patient denies chronic cough, phlegm, hemoptysis, pleurisy/chest pain, pneumonia, +asthma, wheezing, difficulty in breathing with exertion, emphysema,  chronic bronchitis, shortness of breath or abnormal sound when breathing.     Cardiovascular:  There is no history of chest pain, chest pressure/discomfort, palpitations, irregular heartbeat, fainting or near-fainting, difficulty breathing when lying flat, SOB/Coughing at night, swelling of the legs or chest pain while walking.    Breasts:  See history of present illness    Gastrointestinal:     There is no history of difficulty or pain with swallowing, reflux symptoms, vomiting, dark or bloody stools, constipation, yellowing of the skin, indigestion, nausea, change in bowel habits, diarrhea, abdominal pain or vomiting blood.     Genitourinary:  The patient denies frequent urination, needing to get up at night to urinate, urinary hesitancy or retaining urine, painful urination, urinary incontinence, decreased urine stream, blood in the urine or vaginal/penile discharge.    Skin:    The patient denies rash, itching, skin lesions, dry skin, change in skin color or +change in moles.     Hematologic/Lymphatic:  The patient denies easily bruising or bleeding or persistent swollen glands or lymph nodes.     Musculoskeletal:  The patient denies muscle aches/pain, joint pain, stiff joints, neck pain, back pain or bone pain.    Neuropsychiatric:  There is no history of migraines or severe headaches, seizure/epilepsy, speech problems, coordination problems, trembling/tremors, fainting/black outs, dizziness, memory problems, loss of sensation/numbness, problems walking, weakness, tingling or burning in hands/feet. There is no history of abusive relationship, bipolar disorder, +sleep disturbance, +anxiety, depression or feeling of despair.    Endocrine:    There is no history of poor/slow wound healing, weight loss/gain, fertility or hormone problems, cold intolerance, thyroid disease.     Allergic/Immunologic:  There is no history of hives, hay fever, angioedema or anaphylaxis.    /90 (BP Location: Left arm, Patient  Position: Sitting, Cuff Size: adult)   Pulse 75   Temp 98.2 °F (36.8 °C) (Temporal)   Resp 18   Wt 77.7 kg (171 lb 3.2 oz)   SpO2 98%   BMI 33.44 kg/m²     Physical Exam:  The patient is an alert, oriented, well-nourished and  well-developed woman who appears her stated age. Her speech patterns and movements are normal. Her affect is appropriate.    HEENT: The head is normocephalic. The neck is supple. The thyroid is not enlarged and is without palpable masses/nodules. There are no palpable masses. The trachea is in the midline. Conjunctiva are clear, non-icteric.    Chest: The chest expands symmetrically. The lungs are clear to auscultation.    Heart: The rhythm is regular.  There are no murmurs, rubs, gallops or thrills.    Breasts:  Her breasts are asymmetrical right greater than left with a cup size 38D.  Right breast: The skin, nipple ,and areola appear normal. There is no skin dimpling with movement of the pectoralis. There is no nipple retraction. No nipple discharge can be elicited. The parenchyma is mildly nodular. There are no dominant masses in the breast. The axillary tail is normal.  Left breast:   Evidence of swelling with no palpable chest wall nodules or visible concerning findings.  Abdomen:  The abdomen is soft, flat and non tender. The liver is not enlarged. There are no palpable masses.    Lymph Nodes:  The supraclavicular, axillary and cervical regions are free of significant lymphadenopathy.    Back: There is no vertebral column tenderness.    Skin: The skin appears normal. There are no suspicious appearing rashes or lesions.    Extremities: The extremities are without deformity, cyanosis or edema.    Impression:   Ms. Makeda Garduno is a 54 year old woman presents with personal history of  Cancer Staging   Malignant neoplasm of overlapping sites of left breast in female, estrogen receptor positive (HCC)  Staging form: Breast, AJCC 8th Edition  - Pathologic stage from 7/20/2021:  Stage IB (pT2, pN2, cM0, G2, ER+, DC+, HER2-) - Signed by Jayjay Mayorga MD on 8/10/2021  - Clinical: No stage assigned - Unsigned    Discussion and Plan:  I had a discussion with the Patient regarding her breast exam. On exam today I found no clinical evidence of malignancy bilaterally.  She does have some scar tissue in the left breast and what appears to be fat necrosis and evidence of lymphedema in the left upper extremity.  She is scheduled to have a lymphovenous anastomosis performed tomorrow to [facility] to reconcile her concerns.  Will continue to monitor the calcifications in the right breast with a diagnostic evaluation due in December 2024 and she will have her surveillance annual MRI given her history also in December 2024 and will clinically follow with me on an exam in the office at that time.   She was given ample opportunity for questions and those questions were answered to her satisfaction. She has been  encouraged to contact the office with any questions or concerns prior to her next appointment.     This encounter lasted a total of 25 minutes, more than 50% of which was dedicated to the discussion of management options.

## 2024-08-01 DIAGNOSIS — I10 ESSENTIAL HYPERTENSION: ICD-10-CM

## 2024-08-06 NOTE — TELEPHONE ENCOUNTER
Please review; protocol failed/No Protocol    Requested Prescriptions   Pending Prescriptions Disp Refills    LOSARTAN-HYDROCHLOROTHIAZIDE 50-12.5 MG Oral Tab [Pharmacy Med Name: LOSARTAN/HCTZ 50/12.5MG TABLETS] 90 tablet 0     Sig: TAKE 1 TABLET BY MOUTH DAILY       Hypertension Medications Protocol Failed - 8/1/2024 12:06 PM        Failed - Last BP reading less than 140/90     BP Readings from Last 1 Encounters:   06/25/24 133/90               Passed - CMP or BMP in past 12 months        Passed - In person appointment or virtual visit in the past 12 mos or appointment in next 3 mos     Recent Outpatient Visits              1 month ago Carcinoma of overlapping sites of left breast in female, estrogen receptor positive (HCC)    [facility] Ritika Miller MD    Office Visit    2 months ago Malignant neoplasm of overlapping sites of left breast in female, estrogen receptor positive (HCC)    [facility]    Office Visit    2 months ago Postmenopausal    [facility] in [place] Jayjay Mayorga MD    Office Visit    7 months ago Encounter for annual routine gynecological examination    [facility] - OB/GYN Molly Martinez MD    Office Visit    8 months ago Routine general medical examination at a health care facility    [address] Camille Guzman MD    Office Visit          Future Appointments         Provider Department Appt Notes    In 3 months Jayjay Mayorga MD [facility] in [place] md cinthia, inf zometa    In 3 months  [place]1 [facility] [place], inf zometa    In 4 months  JOSELITO RM3 [facility] Mammography     In 4 months  MR RM2 (1.5T WIDE) [facility] MRI     In 5 months Ritika Miller MD [facility]                      Passed - EGFRCR or GFRNAA > 50     GFR Evaluation  EGFRCR: 74 , resulted on 5/17/2024             Future Appointments         Provider Department Appt Notes    In 3 months Jayjay Mayorga MD [facility] in [place] md cinthia, inf zometa    In 3 months [facility] [place] [facility]    In 4 months EH JOSELITO RM3 [facility] Mammography     In 4 months  MR RM2 (1.5T WIDE) [facility] MRI     In 5 months Ritika Miller MD [facility]           Recent Outpatient Visits              1 month ago Carcinoma of overlapping sites of left breast in female, estrogen receptor positive (HCC)    [facility] Ritika Miller MD    Office Visit    2 months ago Malignant neoplasm of overlapping sites of left breast in female, estrogen receptor positive (HCC)    [facility]    Office Visit    2 months ago Postmenopausal    [facility] in [place] Jayjay Mayorga MD    Office Visit    7 months ago Encounter for annual routine gynecological examination    [facility] - OB/GYN Molly Martinez MD    Office Visit    8 months ago Routine general medical examination at a health care facility    [facility], [address] [place] [address] Camille Guzman MD    Office Visit

## 2024-08-07 RX ORDER — LOSARTAN POTASSIUM AND HYDROCHLOROTHIAZIDE 12.5; 5 MG/1; MG/1
1 TABLET ORAL DAILY
Qty: 90 TABLET | Refills: 3 | Status: SHIPPED | OUTPATIENT
Start: 2024-08-07 | End: 2025-08-02

## 2024-08-26 DIAGNOSIS — Z17.0 MALIGNANT NEOPLASM OF OVERLAPPING SITES OF LEFT BREAST IN FEMALE, ESTROGEN RECEPTOR POSITIVE (HCC): ICD-10-CM

## 2024-08-26 DIAGNOSIS — C50.812 MALIGNANT NEOPLASM OF OVERLAPPING SITES OF LEFT BREAST IN FEMALE, ESTROGEN RECEPTOR POSITIVE (HCC): ICD-10-CM

## 2024-08-26 RX ORDER — ANASTROZOLE 1 MG/1
1 TABLET ORAL DAILY
Qty: 90 TABLET | Refills: 1 | Status: SHIPPED | OUTPATIENT
Start: 2024-08-26

## 2024-08-31 ENCOUNTER — HOSPITAL ENCOUNTER (OUTPATIENT)
Age: 54
Discharge: HOME | End: 2024-08-31
Payer: COMMERCIAL

## 2024-08-31 VITALS
TEMPERATURE: 99 F | RESPIRATION RATE: 22 BRPM | OXYGEN SATURATION: 97 % | WEIGHT: 170 LBS | HEART RATE: 86 BPM | DIASTOLIC BLOOD PRESSURE: 86 MMHG | SYSTOLIC BLOOD PRESSURE: 146 MMHG | BODY MASS INDEX: 33 KG/M2

## 2024-08-31 DIAGNOSIS — J01.40 ACUTE PANSINUSITIS, RECURRENCE NOT SPECIFIED: Primary | ICD-10-CM

## 2024-08-31 LAB — SARS-COV-2 RNA RESP QL NAA+PROBE: NOT DETECTED

## 2024-08-31 RX ORDER — PREDNISONE 20 MG/1
40 TABLET ORAL DAILY
Qty: 10 TABLET | Refills: 0 | Status: SHIPPED | OUTPATIENT
Start: 2024-08-31 | End: 2024-09-05

## 2024-08-31 NOTE — ED INITIAL ASSESSMENT (HPI)
3 weeks ago she was in [place] and began with sinus problems and congestion but it just keeps getting worse.  Sinus pain and drainage has becomes blood tinged with fatigue and cough

## 2024-08-31 NOTE — ED PROVIDER NOTES
Patient Seen in: [facility]      History     Chief Complaint   Patient presents with    Cough/URI     Stated Complaint: Sinus infection, fever    Subjective:   This is a 54-year-old female with below stated medical history.  Presents to immediate care for sinus pain and pressure, headache, nasal congestion, postnasal drip, and mild cough.  Symptom started 3 weeks ago.  She was exposed to COVID during a trip to [place] at time symptoms started.  Tested multiple times for COVID at home but was always negative.  She reports symptoms continuing or worsening.  No chest pain or shortness of breath.  No difficulty breathing or wheezing.  Reports low-grade fevers at home.  Taking Flonase and antihistamines with little relief.    The history is provided by the patient.           Objective:   Past Medical History:    Anxiety    Asthma (HCC)    Breast cancer (HCC)    Left breast    Bronchitis    Ductal carcinoma in situ of breast    Exposure to medical diagnostic radiation    2/24/2022    Hypertension    Migraines    Personal history of antineoplastic chemotherapy    last treatment 12/2021    PONV (postoperative nausea and vomiting)    Visual impairment    glasses for reading              Past Surgical History:   Procedure Laterality Date    Chemotherapy      Colonoscopy      Inject nerv blck,axillary nerv      Lumpectomy left Left 05/28/2021    IDC;DCIS    Other      sinus surgery x2    Other surgical history      Lymph node transfer    Radiation left      Reduction of large breast Right 06/2023    Reduction right      2023    Tonsillectomy      Us breast biopsy 1 site left (cpt=19083) Left 05/2021    DCIS;IDC                No pertinent social history.            Review of Systems   Constitutional:  Positive for chills, fatigue and fever.   HENT:  Positive for congestion, sinus pressure and sinus pain. Negative for ear discharge, ear pain and sore throat.    Respiratory:  Positive for cough. Negative  for shortness of breath, wheezing and stridor.    Cardiovascular:  Negative for chest pain, palpitations and leg swelling.   Gastrointestinal:  Negative for abdominal pain.   Genitourinary:  Negative for dysuria.   Musculoskeletal:  Negative for back pain, neck pain and neck stiffness.   Skin:  Negative for rash.   Allergic/Immunologic: Negative for environmental allergies.   Neurological:  Negative for headaches.   Hematological:  Does not bruise/bleed easily.       Positive for stated Chief Complaint: Cough/URI    Other systems are as noted in HPI.  Constitutional and vital signs reviewed.      All other systems reviewed and negative except as noted above.    Physical Exam     ED Triage Vitals   BP 08/31/24 1350 146/86   Pulse 08/31/24 1325 86   Resp 08/31/24 1325 22   Temp 08/31/24 1325 98.6 °F (37 °C)   Temp src 08/31/24 1325 Temporal   SpO2 08/31/24 1325 97 %   O2 Device 08/31/24 1325 None (Room air)       Current Vitals:   Vital Signs  BP: 146/86  Pulse: 86  Resp: 22  Temp: 98.6 °F (37 °C)  Temp src: Temporal    Oxygen Therapy  SpO2: 97 %  O2 Device: None (Room air)            Physical Exam  Vitals and nursing note reviewed.   Constitutional:       General: She is not in acute distress.     Appearance: Normal appearance. She is not ill-appearing, toxic-appearing or diaphoretic.   HENT:      Head: Normocephalic and atraumatic.      Right Ear: Tympanic membrane, ear canal and external ear normal. There is no impacted cerumen.      Left Ear: Tympanic membrane, ear canal and external ear normal. There is no impacted cerumen.      Nose: Congestion and rhinorrhea present.      Mouth/Throat:      Mouth: Mucous membranes are moist.      Pharynx: Oropharynx is clear. No oropharyngeal exudate or posterior oropharyngeal erythema.   Eyes:      General:         Right eye: No discharge.         Left eye: No discharge.      Extraocular Movements: Extraocular movements intact.      Conjunctiva/sclera: Conjunctivae normal.    Cardiovascular:      Rate and Rhythm: Normal rate and regular rhythm.      Heart sounds: Normal heart sounds. No murmur heard.  Pulmonary:      Effort: Pulmonary effort is normal. No respiratory distress.      Breath sounds: Normal breath sounds. No stridor. No wheezing, rhonchi or rales.   Musculoskeletal:      Cervical back: Neck supple.      Right lower leg: No edema.      Left lower leg: No edema.   Skin:     General: Skin is warm and dry.      Capillary Refill: Capillary refill takes less than 2 seconds.      Findings: No rash.   Neurological:      Mental Status: She is alert and oriented to person, place, and time.   Psychiatric:         Mood and Affect: Mood normal.         Behavior: Behavior normal.               ED Course     Labs Reviewed   RAPID SARS-COV-2 BY PCR - Normal             Rapid covid.          MDM      Vital signs stable. Patient is well-appearing and nontoxic looking. Patient presents to immediate care for sinus pain and pressure, nasal congestion, and headache.     Differential diagnosis include bacterial sinusitis, viral sinusitis, COVID, allergic rhinitis    Symptoms ongoing for greater than 7 days. Rapid Covid is negative.     Exam consistent with acute sinusitis.    DC home. Course of Augmentin and short course of prednisone prescribed. Recommended continuing over-the-counter antihistamines and Flonase. Nasal saline rinses. Follow-up with PCP in 1 week. Reasons to seek emergent treatment reinforced. Patient verbalized understanding, and agreed with plan of care. All questions answered.                                     Medical Decision Making      Disposition and Plan     Clinical Impression:  1. Acute pansinusitis, recurrence not specified         Disposition:  Discharge  8/31/2024  1:48 pm    Follow-up:  Camille Guzman MD  [address] [place] [address]  [phone]    In 1 week            Medications Prescribed:  Current Discharge Medication List        START  taking these medications    Details   amoxicillin clavulanate 875-125 MG Oral Tab Take 1 tablet by mouth 2 (two) times daily for 10 days.  Qty: 20 tablet, Refills: 0      predniSONE 20 MG Oral Tab Take 2 tablets (40 mg total) by mouth daily for 5 days.  Qty: 10 tablet, Refills: 0

## 2024-08-31 NOTE — DISCHARGE INSTRUCTIONS
Please take Augmentin and prednisone as prescribed.  Over-the-counter antihistamines and Flonase.

## 2024-09-12 ENCOUNTER — HOSPITAL ENCOUNTER (OUTPATIENT)
Age: 54
Discharge: HOME | End: 2024-09-12
Payer: COMMERCIAL

## 2024-09-12 ENCOUNTER — TELEPHONE (OUTPATIENT)
Dept: HEMATOLOGY/ONCOLOGY | Facility: HOSPITAL | Age: 54
End: 2024-09-12

## 2024-09-12 VITALS
WEIGHT: 170 LBS | DIASTOLIC BLOOD PRESSURE: 96 MMHG | HEIGHT: 60 IN | BODY MASS INDEX: 33.38 KG/M2 | TEMPERATURE: 98 F | HEART RATE: 75 BPM | SYSTOLIC BLOOD PRESSURE: 123 MMHG | RESPIRATION RATE: 16 BRPM | OXYGEN SATURATION: 98 %

## 2024-09-12 DIAGNOSIS — B37.31 YEAST VAGINITIS: Primary | ICD-10-CM

## 2024-09-12 DIAGNOSIS — N89.8 VAGINAL DISCHARGE: ICD-10-CM

## 2024-09-12 PROCEDURE — 99213 OFFICE O/P EST LOW 20 MIN: CPT | Performed by: PHYSICIAN ASSISTANT

## 2024-09-12 PROCEDURE — 81514 NFCT DS BV&VAGINITIS DNA ALG: CPT | Performed by: PHYSICIAN ASSISTANT

## 2024-09-12 RX ORDER — FLUCONAZOLE 150 MG/1
150 TABLET ORAL ONCE
Qty: 1 TABLET | Refills: 1 | Status: SHIPPED | OUTPATIENT
Start: 2024-09-12 | End: 2024-09-12

## 2024-09-12 NOTE — TELEPHONE ENCOUNTER
Patient called and said that she will dropping her Family Medical Leave Act forms tomorrow and needs them expedited, she has been sick.  This is a recertification but there is a change because she had lymphedemy surgery and will need to change some of the verbage. I told there that the rep will be calling her to get it upldated. Patient expressed understanding. 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

## 2024-09-12 NOTE — ED INITIAL ASSESSMENT (HPI)
Pt presents with \"yeast infection after taking antibiotics\", per pt.     Pt reports I've had yeast infections before and this happens.     Pt completed abx on Monday 9/10/24.

## 2024-09-12 NOTE — ED PROVIDER NOTES
Chief Complaint   Patient presents with    Yeast Infection       HPI:     Makeda Garduno is a 54 year old female presents to immediate care with complaints of yeast infection.  Patient states she just finished antibiotics and typically gets yeast infection after and has symptoms consistent with previous yeast vaginitis.  Patient is vaginal irritation and itching and some white discharge.  Denies any concerns for STDs.  No fever, chills, abdominal pain, nausea, vomiting.  No urinary symptoms.  No other complaints.      PFSH    PFSH asessment screens reviewed  Nurses notes reviewed    Family History   Problem Relation Age of Onset    DCIS Self 54    Breast Cancer Self 54    Breast Cancer Mother 40    Hypertension Father     Cancer Brother         skin     Breast Cancer Maternal Grandmother 57    Cancer Paternal Grandmother 80        stomach     Cancer Maternal Uncle         prostate    Cancer Paternal Uncle         prostate    Breast Cancer Maternal Cousin Female 58       Social History     Socioeconomic History    Marital status: Single     Spouse name: Not on file    Number of children: Not on file    Years of education: Not on file    Highest education level: Not on file   Occupational History    Not on file   Tobacco Use    Smoking status: Never    Smokeless tobacco: Never   Vaping Use    Vaping status: Never Used   Substance and Sexual Activity    Alcohol use: Yes     Comment: couple times per week    Drug use: Never    Sexual activity: Not Currently   Other Topics Concern    Not on file   Social History Narrative    Not on file     Social Determinants of Health     Financial Resource Strain: Low Risk  (6/26/2024)    Received from [facility]    Overall Financial Resource Strain (CARDIA)     Difficulty of Paying Living Expenses: Not hard at all   Food Insecurity: No Food Insecurity (6/26/2024)    Received from [facility]    Hunger Vital Sign     Worried About Running  Out of Food in the Last Year: Never true     Ran Out of Food in the Last Year: Never true   Transportation Needs: No Transportation Needs (6/26/2024)    Received from [facility]    PRAPARE - Transportation     Lack of Transportation (Medical): No     Lack of Transportation (Non-Medical): No   Physical Activity: Sufficiently Active (6/26/2024)    Received from [facility]    Exercise Vital Sign     Days of Exercise per Week: 5 days     Minutes of Exercise per Session: 50 min   Stress: Patient Declined (6/26/2024)    Received from [facility]    Citizen of [place] [employer] - Occupational Stress Questionnaire     Feeling of Stress : Patient declined   Social Connections: Unknown (6/26/2024)    Received from [facility]    Social Connection and Isolation Panel [NHANES]     Frequency of Communication with Friends and Family: More than three times a week     Frequency of Social Gatherings with Friends and Family: More than three times a week     Attends Pentecostalism Services: Patient declined     Active Member of Clubs or Organizations: Patient declined     Attends Club or Organization Meetings: Patient declined     Marital Status: Patient declined   Housing Stability: Low Risk  (6/26/2024)    Received from [facility]    Housing Stability Vital Sign     Unable to Pay for Housing in the Last Year: No     Number of Places Lived in the Last Year: 1     In the last 12 months, was there a time when you did not have a steady place to sleep or slept in a shelter (including now)?: No         ROS:   Positive for stated complaint  All other systems reviewed and negative except as noted above.  Constitutional and Vital Signs Reviewed.      Physical Exam:     Findings:    BP (!) 123/96   Pulse 75   Temp 98.3 °F (36.8 °C) (Temporal)   Resp 16   Ht 152.4 cm (5')   Wt 77.1 kg   SpO2 98%   BMI 33.20 kg/m²   GENERAL: alert,  normal appearance, no distress.                 HEAD: Normocephalic, atraumatic.     EYES: Conjunctiva without injection, EOMs intact.                        LUNGS: No respiratory distress, nonlabored respirations.      GI: Abdomen soft and nonrigid, no tenderness to palpation  : Deferred         MSK: Normal rom.     NEURO: Alert and oriented.       MDM/Assessment/Plan:   Orders for this encounter:    Orders Placed This Encounter    Vaginitis Vaginosis PCR Panel     Order Specific Question:   Release to patient     Answer:   Immediate    fluconazole (DIFLUCAN) 150 MG Oral Tab     Sig: Take 1 tablet (150 mg total) by mouth once for 1 dose. Take 1 tablet by mouth, can repeat in 72 hours if still symptomatic     Dispense:  1 tablet     Refill:  1       Labs performed this visit:  No results found for this or any previous visit (from the past 10 hour(s)).    MDM:  54-year-old female presenting for complaints of what feels like a typical yeast infection for her, recently finished antibiotics.  Offered pelvic exam, patient declines.  Offered vaginitis and GC/chlamydia self swab, patient would like to do vaginitis swab but wishes to defer GC/chlamydia.  Will give prescription for Diflucan and send swab to lab.  Follow-up with primary care in 3 to 5 days and present to ER for new or worsening symptoms.  Patient agreeable with treatment plan and follow-up, all questions answered prior to discharge.    Diagnosis:    ICD-10-CM    1. Yeast vaginitis  B37.31       2. Vaginal discharge  N89.8 Vaginitis Vaginosis PCR Panel     Vaginitis Vaginosis PCR Panel          All results reviewed and discussed with patient.  See AVS for detailed discharge instructions for your condition today.    Follow Up with:  Camille Guzman MD  [address] [place] [address]  [phone]      Follow-up with primary care in 3 to 5 days if not better and present to ER for new or worsening symptoms              NOTE TO PATIENT:  The  21st Century Cures Act makes medical notes like these available to patients in the interest of transparency. However, be advised this is a medical document. It is intended as peer to peer communication. It is written in medical language and may contain abbreviations or verbiage that are unfamiliar. It may appear blunt or direct. Medical documents are intended to carry relevant information, facts as evident, and the clinical opinion of the practitioner. .

## 2024-09-13 LAB
BV BACTERIA DNA VAG QL NAA+PROBE: NEGATIVE
C GLABRATA DNA VAG QL NAA+PROBE: NEGATIVE
C KRUSEI DNA VAG QL NAA+PROBE: NEGATIVE
CANDIDA DNA VAG QL NAA+PROBE: NEGATIVE
T VAGINALIS DNA VAG QL NAA+PROBE: NEGATIVE

## 2024-09-13 NOTE — TELEPHONE ENCOUNTER
Received Family Medical Leave Act forms via email, no authorization attached, sending [employer] to obtain Release of Information completion, after reviewing the below;    Called patient to obtain details and get forms completed, no answer no voicemail to leave message, will also send details message as well to get form underway

## 2024-09-22 ENCOUNTER — HOSPITAL ENCOUNTER (EMERGENCY)
Facility: HOSPITAL | Age: 54
Discharge: HOME | End: 2024-09-22
Attending: EMERGENCY MEDICINE
Payer: COMMERCIAL

## 2024-09-22 VITALS
OXYGEN SATURATION: 99 % | HEIGHT: 60 IN | WEIGHT: 170 LBS | DIASTOLIC BLOOD PRESSURE: 96 MMHG | TEMPERATURE: 99 F | HEART RATE: 72 BPM | RESPIRATION RATE: 19 BRPM | BODY MASS INDEX: 33.38 KG/M2 | SYSTOLIC BLOOD PRESSURE: 150 MMHG

## 2024-09-22 DIAGNOSIS — R42 DIZZINESS: Primary | ICD-10-CM

## 2024-09-22 LAB
ALBUMIN SERPL-MCNC: 4.5 G/DL (ref 3.2–4.8)
ALBUMIN/GLOB SERPL: 1.5 {RATIO} (ref 1–2)
ALP LIVER SERPL-CCNC: 63 U/L
ALT SERPL-CCNC: 16 U/L
ANION GAP SERPL CALC-SCNC: 6 MMOL/L (ref 0–18)
AST SERPL-CCNC: 26 U/L (ref ?–34)
ATRIAL RATE: 67 BPM
BASOPHILS # BLD AUTO: 0.03 X10(3) UL (ref 0–0.2)
BASOPHILS NFR BLD AUTO: 0.4 %
BILIRUB SERPL-MCNC: 1.1 MG/DL (ref 0.3–1.2)
BUN BLD-MCNC: 13 MG/DL (ref 9–23)
CALCIUM BLD-MCNC: 9.8 MG/DL (ref 8.7–10.4)
CHLORIDE SERPL-SCNC: 101 MMOL/L (ref 98–112)
CO2 SERPL-SCNC: 25 MMOL/L (ref 21–32)
CREAT BLD-MCNC: 0.82 MG/DL
EGFRCR SERPLBLD CKD-EPI 2021: 84 ML/MIN/1.73M2 (ref 60–?)
EOSINOPHIL # BLD AUTO: 0.12 X10(3) UL (ref 0–0.7)
EOSINOPHIL NFR BLD AUTO: 1.6 %
ERYTHROCYTE [DISTWIDTH] IN BLOOD BY AUTOMATED COUNT: 11.9 %
GLOBULIN PLAS-MCNC: 3 G/DL (ref 2–3.5)
GLUCOSE BLD-MCNC: 104 MG/DL (ref 70–99)
HCT VFR BLD AUTO: 35.8 %
HGB BLD-MCNC: 12.8 G/DL
IMM GRANULOCYTES # BLD AUTO: 0.03 X10(3) UL (ref 0–1)
IMM GRANULOCYTES NFR BLD: 0.4 %
LYMPHOCYTES # BLD AUTO: 1.27 X10(3) UL (ref 1–4)
LYMPHOCYTES NFR BLD AUTO: 16.6 %
MCH RBC QN AUTO: 32.5 PG (ref 26–34)
MCHC RBC AUTO-ENTMCNC: 35.8 G/DL (ref 31–37)
MCV RBC AUTO: 90.9 FL
MONOCYTES # BLD AUTO: 0.49 X10(3) UL (ref 0.1–1)
MONOCYTES NFR BLD AUTO: 6.4 %
NEUTROPHILS # BLD AUTO: 5.71 X10 (3) UL (ref 1.5–7.7)
NEUTROPHILS # BLD AUTO: 5.71 X10(3) UL (ref 1.5–7.7)
NEUTROPHILS NFR BLD AUTO: 74.6 %
OSMOLALITY SERPL CALC.SUM OF ELEC: 274 MOSM/KG (ref 275–295)
P AXIS: 29 DEGREES
P-R INTERVAL: 146 MS
PLATELET # BLD AUTO: 232 10(3)UL (ref 150–450)
POTASSIUM SERPL-SCNC: 4 MMOL/L (ref 3.5–5.1)
PROT SERPL-MCNC: 7.5 G/DL (ref 5.7–8.2)
Q-T INTERVAL: 450 MS
QRS DURATION: 78 MS
QTC CALCULATION (BEZET): 475 MS
R AXIS: -2 DEGREES
RBC # BLD AUTO: 3.94 X10(6)UL
SODIUM SERPL-SCNC: 132 MMOL/L (ref 136–145)
T AXIS: 19 DEGREES
TROPONIN I SERPL HS-MCNC: 3 NG/L
VENTRICULAR RATE: 67 BPM
WBC # BLD AUTO: 7.7 X10(3) UL (ref 4–11)

## 2024-09-22 PROCEDURE — 93005 ELECTROCARDIOGRAM TRACING: CPT

## 2024-09-22 PROCEDURE — 96360 HYDRATION IV INFUSION INIT: CPT

## 2024-09-22 PROCEDURE — 84484 ASSAY OF TROPONIN QUANT: CPT | Performed by: EMERGENCY MEDICINE

## 2024-09-22 PROCEDURE — 93010 ELECTROCARDIOGRAM REPORT: CPT

## 2024-09-22 PROCEDURE — 99284 EMERGENCY DEPT VISIT MOD MDM: CPT

## 2024-09-22 PROCEDURE — 85025 COMPLETE CBC W/AUTO DIFF WBC: CPT | Performed by: EMERGENCY MEDICINE

## 2024-09-22 PROCEDURE — 96361 HYDRATE IV INFUSION ADD-ON: CPT

## 2024-09-22 PROCEDURE — 80053 COMPREHEN METABOLIC PANEL: CPT | Performed by: EMERGENCY MEDICINE

## 2024-09-22 NOTE — ED PROVIDER NOTES
Patient Seen in: [facility] Emergency Department      History     Chief Complaint   Patient presents with    Nausea    Syncope     Stated Complaint: \"I don't feel good\", nausea, abd pain. pt crying @ TL    Subjective:   HPI    54-year-old female presents for evaluation of dizziness.  Patient was taking a shower earlier today when she felt lightheaded/dizzy.  Symptoms persisted when she was attempting to get dressed.  Does not describe vertigo or imbalance.  Feels a little bit better now.  Concern for dehydration.  Increased thirst recently.  No vomiting or diarrhea.  No chest pain or shortness of breath.    Objective:   Past Medical History:    Anxiety    Asthma (HCC)    Breast cancer (HCC)    Left breast    Bronchitis    Ductal carcinoma in situ of breast    Exposure to medical diagnostic radiation    2/24/2022    Hypertension    Migraines    Personal history of antineoplastic chemotherapy    last treatment 12/2021    PONV (postoperative nausea and vomiting)    Visual impairment    glasses for reading              Past Surgical History:   Procedure Laterality Date    Chemotherapy      Colonoscopy      Inject nerv blck,axillary nerv      Lumpectomy left Left 05/28/2021    IDC;DCIS    Other      sinus surgery x2    Other surgical history      Lymph node transfer    Radiation left      Reduction of large breast Right 06/2023    Reduction right      2023    Tonsillectomy      Us breast biopsy 1 site left (cpt=19083) Left 05/2021    DCIS;IDC                Social History     Socioeconomic History    Marital status: Single   Tobacco Use    Smoking status: Never    Smokeless tobacco: Never   Vaping Use    Vaping status: Never Used   Substance and Sexual Activity    Alcohol use: Yes     Comment: couple times per week    Drug use: Never    Sexual activity: Not Currently     Social Determinants of Health     Financial Resource Strain: Low Risk  (6/26/2024)    Received from [facility]    Overall  Financial Resource Strain (CARDIA)     Difficulty of Paying Living Expenses: Not hard at all   Food Insecurity: No Food Insecurity (6/26/2024)    Received from [facility]    Hunger Vital Sign     Worried About Running Out of Food in the Last Year: Never true     Ran Out of Food in the Last Year: Never true   Transportation Needs: No Transportation Needs (6/26/2024)    Received from [facility]    PRAPARE - Transportation     Lack of Transportation (Medical): No     Lack of Transportation (Non-Medical): No   Physical Activity: Sufficiently Active (6/26/2024)    Received from [facility]    Exercise Vital Sign     Days of Exercise per Week: 5 days     Minutes of Exercise per Session: 50 min   Stress: Patient Declined (6/26/2024)    Received from [facility]    [place] [employer]ational [employer] - Occupational Stress Questionnaire     Feeling of Stress : Patient declined   Social Connections: Unknown (6/26/2024)    Received from [facility]    Social Connection and Isolation Panel [NHANES]     Frequency of Communication with Friends and Family: More than three times a week     Frequency of Social Gatherings with Friends and Family: More than three times a week     Attends Roman Catholic Services: Patient declined     Active Member of Clubs or Organizations: Patient declined     Attends Club or Organization Meetings: Patient declined     Marital Status: Patient declined   Housing Stability: Low Risk  (6/26/2024)    Received from [facility]    Housing Stability Vital Sign     Unable to Pay for Housing in the Last Year: No     Number of Places Lived in the Last Year: 1     In the last 12 months, was there a time when you did not have a steady place to sleep or slept in a shelter (including now)?: No              Review of Systems    Positive for stated Chief Complaint: Nausea and Syncope    Other systems  are as noted in HPI.  Constitutional and vital signs reviewed.      All other systems reviewed and negative except as noted above.    Physical Exam     ED Triage Vitals [09/22/24 1234]   BP (!) 141/93   Pulse 74   Resp 17   Temp 98.8 °F (37.1 °C)   Temp src Temporal   SpO2 99 %   O2 Device None (Room air)       Current Vitals:   Vital Signs  BP: (!) 150/96  Pulse: 72  Resp: 19  Temp: 98.8 °F (37.1 °C)  Temp src: Temporal    Oxygen Therapy  SpO2: 99 %  O2 Device: None (Room air)            Physical Exam     General: Alert, oriented, no apparent distress  HEENT: Atraumatic, normocephalic.  Pupils equal reactive.  Extraocular motions intact.   Neck: Supple  Lungs: Clear to auscultation bilaterally.  Heart: Regular rate and rhythm.  Abdomen: Soft, nontender.   Skin: No rash.  No edema.  Neurologic: No focal neurologic deficits.    Musculoskeletal: No tenderness or deformity noted.       ED Course     Labs Reviewed   COMP METABOLIC PANEL (14) - Abnormal; Notable for the following components:       Result Value    Glucose 104 (*)     Sodium 132 (*)     Calculated Osmolality 274 (*)     All other components within normal limits   TROPONIN I HIGH SENSITIVITY - Normal   CBC WITH DIFFERENTIAL WITH PLATELET     EKG    Rate, intervals and axes as noted on EKG Report.  Rate: 67  Rhythm: Sinus Rhythm  Reading: no St elevation or depression                          MDM        Differential diagnosis includes dehydration, metabolic disturbance, arrhythmia        CBC normal.  Troponin negative.  No arrhythmias on telemetry.  Sodium slightly low possibly consistent with mild dehydration.  IV fluids given             Medications   sodium chloride 0.9 % IV bolus 1,000 mL (0 mL Intravenous Stopped 9/22/24 6032)     Evaluation and treatment plan discussed with patient and family members present.  Close follow-up is advised for continuing symptoms.  Return to ER recommended if worsening or changing symptoms arise prior to follow-up                 Medical Decision Making      Disposition and Plan     Clinical Impression:  1. Dizziness         Disposition:  Discharge  9/22/2024  5:07 pm    Follow-up:  Camille Guzman MD  [address] [place] [id number] [phone]    Call            Medications Prescribed:  Current Discharge Medication List

## 2024-09-22 NOTE — ED INITIAL ASSESSMENT (HPI)
Sudden onset of dizziness  and nausea  while  taking shower today .  Patient said she is very anxious and super stressed today . No vomiting  hypertensive taking medicine at home . Patient is ambulatory with steady gait

## 2024-09-23 NOTE — TELEPHONE ENCOUNTER
Patient called today and gathered the below,     Type of Leave: Intermittent Family Medical Leave Act recertification   Reason for Leave: Lyphedema/ OT   Start date of leave: 09/01/2024  End date of leave: 08/31/20256  How many flare ups per month/length?: 1-8 Flare 1/4 hrs duration   How many appts per month/length?: 2x's weekly 1-4 hrs per appointment   Was Fee and Turnaround info Given?: Yes

## 2024-09-24 NOTE — TELEPHONE ENCOUNTER
Dr. Mayorga,       Please sign off on form if you agree to: Family Medical Leave Act intermittent 09/01/2024-08/31/2025  (place your signature on the first page only)    -From your Inbasket, Highlight the patient and click Chart   -Double click the 09/12/2024 Forms Completion telephone encounter  -Scroll down to the Media section   -Click the blue Hyperlink: Family Medical Leave Act Dr. Jayjay Mayorga 09/24/2024  -Click Acknowledge located in the top right ribbon/menu   -Drag the mouse into the blank space of the document and a + sign will appear. Left click to   electronically sign the document.     Thank you,  Brandy BATISTA

## 2024-09-25 NOTE — TELEPHONE ENCOUNTER
Family Medical Leave Act forms completed and uploaded to patient via [employer] due to absence of Release of Information to enable faxing. Archiving forms.

## 2024-11-12 DIAGNOSIS — Z13.29 SCREENING FOR THYROID DISORDER: ICD-10-CM

## 2024-11-12 DIAGNOSIS — Z13.220 SCREENING FOR LIPID DISORDERS: ICD-10-CM

## 2024-11-12 DIAGNOSIS — Z00.00 ROUTINE GENERAL MEDICAL EXAMINATION AT A HEALTH CARE FACILITY: Primary | ICD-10-CM

## 2024-11-12 NOTE — PROGRESS NOTES
[facility] Progress Note      Patient Name:  Makeda Garduno  YOB: 1970  Medical Record Number:  [id number]    Date of visit: 11/15/2024    CHIEF COMPLAINT: L breast ca s/p lumpectomy.    HPI:     54 year old that I have followed since  . S/p L lumpectomy/ALND .  Path-4 cm grade 2 IDC, 7+ LN.  Invasion into perinodal adipose tissue with extensive LVI.  No distant disease on staging.  Received DD AC-weekly  T from 21- 12/10/21. Completed RT 22.  Anastrozole started 3/22. Abemaciclib started ., stopped .  Presents for evaluation.    Diarrhea has resolved.  Underwent breast reduction on contralateral side.  Energy level is good.  No new areas of bone pain.  Left arm lymphedema is reasonably well managed.    s/p Left muscle-sparing latissimus dorsi myocutaneous flap with vascular lymph node transplant to left axilla and ICGN lymphangiography LUE on 24 at [facility].  Lymphedema is much better.    Sis in law just  of adrenal carcinoma. Pain L scapula, mid thoracic area, started after procedure, pt feels it's related to the way she is sleeping after procedure.       SOCIAL HISTORY:    Single, elementary school .  No children.    ROS:     Constitutional: Fatigue, mild nausea.  Neurologic: no headache, seizures, diplopia or weakness  Respiratory: no cough, SOB or hemoptysis  Cardiovascular: no chest pain, ankle swelling, JULIAN  GI: no nausea, vomiting, diarrhea or BRBPR  Musculoskeletal: no body-ache or joint pain  Dermatologic: Rash on neck  : no hematuria, dysuria or frequency  Psych: no confusion or depression   Heme: no easy bruising or bleeding     ALLERGIES:    Allergies   Allergen Reactions    B Complex-C [B-Plex] FACE FLUSHING    Benzoyl Peroxide SWELLING and OTHER (SEE COMMENTS)     Eyes swell shut, skin oozes    Erythromycin NAUSEA AND VOMITING    Dust Mites UNKNOWN     .    Mold UNKNOWN     .       MEDICATIONS:    Current Outpatient  Medications   Medication Sig Dispense Refill    anastrozole 1 MG Oral Tab tab Take 1 tablet (1 mg total) by mouth daily. 90 tablet 1    losartan-hydroCHLOROthiazide 50-12.5 MG Oral Tab Take 1 tablet by mouth daily. 90 tablet 3    ALPRAZolam 0.25 MG Oral Tab Take 1 tablet (0.25 mg total) by mouth nightly as needed for Anxiety. 10 tablet 1    albuterol (PROAIR HFA) 108 (90 Base) MCG/ACT Inhalation Aero Soln Inhale 2 puffs into the lungs every 4 (four) hours as needed for Wheezing or Shortness of Breath (cough). 1 each 1    mupirocin 2 % External Ointment Apply 1 Application topically 2 (two) times daily. 22 g 1    ondansetron (ZOFRAN) 8 MG tablet Take 1 tablet (8 mg total) by mouth every 8 (eight) hours as needed for Nausea. 30 tablet 3    Cetirizine HCl (ZYRTEC ALLERGY) 10 MG Oral Cap Take 1 capsule by mouth daily.      Fluticasone Propionate 50 MCG/ACT Nasal Suspension 2 sprays by Nasal route daily.         VITALS: Height: 152.4 cm (5') (11/15 1023)  Weight: 78.9 kg (174 lb) (11/15 1023)  BSA (Calculated - sq m): 1.76 sq meters (11/15 1023)  Pulse: 74 (11/15 1023)  BP: 149/101 (11/15 1023)  Temp: 97.8 °F (36.6 °C) (11/15 1023)  Do Not Use - Resp Rate: --  SpO2: 98 % (11/15 1023)      PS:  ECO    PHYSICAL EXAM:    General: alert and oriented x 3, not in acute distress.  HEENT: JUDY, oropharynx  Clear. Pallor.   Neck: supple.  No JVD /adenopathy.  CVS: S1S2, regular  Rhythm, no murmurs.   Lungs: Clear to auscultation and percussion.  Abdomen: Soft, non tender, no hepato-splenomegaly.  Extremities:  No edema.  CNS: no focal deficit  Breast exam: Right breast is soft, no masses.  Left breast shows well-healed lumpectomy scar.  No masses or axillary adenopathy.      Emotional well being: Patient's emotional well being was assessed.  No issues requiring acute psychosocial intervention were identified.     LABS:     Recent Results (from the past 24 hours)   CBC W/DIFF [E]    Collection Time: 11/15/24 10:15 AM   Result  Value Ref Range    WBC 4.9 4.0 - 11.0 x10(3) uL    RBC 3.97 3.80 - 5.30 x10(6)uL    HGB 12.8 12.0 - 16.0 g/dL    HCT 35.9 35.0 - 48.0 %    .0 150.0 - 450.0 10(3)uL    MCV 90.4 80.0 - 100.0 fL    MCH 32.2 26.0 - 34.0 pg    MCHC 35.7 31.0 - 37.0 g/dL    RDW 12.4 %    Neutrophil Absolute Prelim 2.76 1.50 - 7.70 x10 (3) uL    Neutrophil Absolute 2.76 1.50 - 7.70 x10(3) uL    Lymphocyte Absolute 1.48 1.00 - 4.00 x10(3) uL    Monocyte Absolute 0.47 0.10 - 1.00 x10(3) uL    Eosinophil Absolute 0.20 0.00 - 0.70 x10(3) uL    Basophil Absolute 0.02 0.00 - 0.20 x10(3) uL    Immature Granulocyte Absolute 0.01 0.00 - 1.00 x10(3) uL    Neutrophil % 55.9 %    Lymphocyte % 30.0 %    Monocyte % 9.5 %    Eosinophil % 4.0 %    Basophil % 0.4 %    Immature Granulocyte % 0.2 %   COMP METABOLIC PANEL [E]    Collection Time: 11/15/24 10:15 AM   Result Value Ref Range    Glucose 100 (H) 70 - 99 mg/dL    Sodium 134 (L) 136 - 145 mmol/L    Potassium 4.2 3.5 - 5.1 mmol/L    Chloride 102 98 - 112 mmol/L    CO2 25.0 21.0 - 32.0 mmol/L    Anion Gap 7 0 - 18 mmol/L    BUN 14 9 - 23 mg/dL    Creatinine 0.90 0.55 - 1.02 mg/dL    Calcium, Total 9.8 8.7 - 10.4 mg/dL    Calculated Osmolality 279 275 - 295 mOsm/kg    eGFR-Cr 75 >=60 mL/min/1.73m2    AST 27 <34 U/L    ALT 19 10 - 49 U/L    Alkaline Phosphatase 66 46 - 118 U/L    Bilirubin, Total 1.6 (H) 0.3 - 1.2 mg/dL    Total Protein 6.9 5.7 - 8.2 g/dL    Albumin 4.5 3.2 - 4.8 g/dL    Globulin  2.4 2.0 - 3.5 g/dL    A/G Ratio 1.9 1.0 - 2.0    Patient Fasting for CMP? Patient not present        ASSESSMENT AND PLAN:     # L breast ca  (T2 N2 Mx):  s/p L lumpectomy/ALND 6/21 4 cm gr 2 IDC, 7+ LN, invasion into perinodal adipose tissue with extensive LVI, 100% ER, 80% AR, HER-2 neg, Ki-67 40%.      DD AC-weekly T from 7/21-12/21 and RT 2/22.    Anastrozole started 3/22, continue till 3/32.   Abemaciclib started 4/22-4/23, discontinued for diarrhea. Started adjuvant zoledronic acid 5/22. Continue q  6 months x 5 years per ASCO guidelines. ONJ risk discussed.  Up-to-date on dentist visit.      DEXA 4/22 was normal, repeat 4/24.  CT and bone scan 5/22-NELDA.  Nonspecific groundglass density left lung apex probably due to RT.  Port-A-Cath removed 7/22.     Blas mammo 6/24-new R calcs, prob fat necrosis and R mammo nghia for 12/24.   Breast MRI 12/23 okay. Repeat 12/24.     # Lymphedema: Underwent LD myocutaneous flap with vascular lymph node transplant to left axilla at [facility] 6/24, significant improvement in symptoms.    # Vit D defi: Check next visit.    # Hyponatremia: Mild and stable.  Noted since 2022 but more seen this year.  Possibly due to HCTZ which she takes with losartan.  Follow for now.    # Liver lesion : noted on CT 12/21-8 mm, also noted on CT outside facility 6/21. Repeat CT 5/22-unchanged.     # Elevated bilirubin: Mild, repeat 1-2 months.    Survivorship issues were addressed with the patient.  Patient-reported issues included arthralgias.  Recommended interventions as follows:  Call if worse, may need imaging...    ORDERS PLACED:    Return in about 6 months (around 5/15/2025) for Labs, MD visit, Infusion.    Francesca Mayorga M.D.    [facility]    [facility]  [address] [place], [address]    11/15/2024

## 2024-11-15 ENCOUNTER — OFFICE VISIT (OUTPATIENT)
Dept: HEMATOLOGY/ONCOLOGY | Facility: HOSPITAL | Age: 54
End: 2024-11-15
Attending: INTERNAL MEDICINE
Payer: COMMERCIAL

## 2024-11-15 ENCOUNTER — PATIENT MESSAGE (OUTPATIENT)
Dept: INTERNAL MEDICINE CLINIC | Facility: CLINIC | Age: 54
End: 2024-11-15

## 2024-11-15 VITALS
TEMPERATURE: 98 F | HEIGHT: 60 IN | DIASTOLIC BLOOD PRESSURE: 101 MMHG | RESPIRATION RATE: 16 BRPM | OXYGEN SATURATION: 98 % | HEART RATE: 74 BPM | BODY MASS INDEX: 34.16 KG/M2 | WEIGHT: 174 LBS | SYSTOLIC BLOOD PRESSURE: 149 MMHG

## 2024-11-15 DIAGNOSIS — Z17.0 MALIGNANT NEOPLASM OF OVERLAPPING SITES OF LEFT BREAST IN FEMALE, ESTROGEN RECEPTOR POSITIVE (HCC): Primary | ICD-10-CM

## 2024-11-15 DIAGNOSIS — C50.812 MALIGNANT NEOPLASM OF OVERLAPPING SITES OF LEFT BREAST IN FEMALE, ESTROGEN RECEPTOR POSITIVE (HCC): Primary | ICD-10-CM

## 2024-11-15 DIAGNOSIS — R17 ELEVATED BILIRUBIN: ICD-10-CM

## 2024-11-15 DIAGNOSIS — Z79.811 AROMATASE INHIBITOR USE: ICD-10-CM

## 2024-11-15 DIAGNOSIS — C77.9 REGIONAL LYMPH NODE METASTASIS PRESENT (HCC): ICD-10-CM

## 2024-11-15 DIAGNOSIS — R63.5 WEIGHT GAIN: ICD-10-CM

## 2024-11-15 DIAGNOSIS — T50.905D ADVERSE EFFECT OF DRUG, SUBSEQUENT ENCOUNTER: ICD-10-CM

## 2024-11-15 LAB
ALBUMIN SERPL-MCNC: 4.5 G/DL (ref 3.2–4.8)
ALBUMIN/GLOB SERPL: 1.9 {RATIO} (ref 1–2)
ALP LIVER SERPL-CCNC: 66 U/L
ALT SERPL-CCNC: 19 U/L
ANION GAP SERPL CALC-SCNC: 7 MMOL/L (ref 0–18)
AST SERPL-CCNC: 27 U/L (ref ?–34)
BASOPHILS # BLD AUTO: 0.02 X10(3) UL (ref 0–0.2)
BASOPHILS NFR BLD AUTO: 0.4 %
BILIRUB DIRECT SERPL-MCNC: 0.5 MG/DL (ref ?–0.3)
BILIRUB SERPL-MCNC: 1.6 MG/DL (ref 0.3–1.2)
BUN BLD-MCNC: 14 MG/DL (ref 9–23)
CALCIUM BLD-MCNC: 9.8 MG/DL (ref 8.7–10.4)
CHLORIDE SERPL-SCNC: 102 MMOL/L (ref 98–112)
CO2 SERPL-SCNC: 25 MMOL/L (ref 21–32)
CREAT BLD-MCNC: 0.9 MG/DL
EGFRCR SERPLBLD CKD-EPI 2021: 75 ML/MIN/1.73M2 (ref 60–?)
EOSINOPHIL # BLD AUTO: 0.2 X10(3) UL (ref 0–0.7)
EOSINOPHIL NFR BLD AUTO: 4 %
ERYTHROCYTE [DISTWIDTH] IN BLOOD BY AUTOMATED COUNT: 12.4 %
GLOBULIN PLAS-MCNC: 2.4 G/DL (ref 2–3.5)
GLUCOSE BLD-MCNC: 100 MG/DL (ref 70–99)
HCT VFR BLD AUTO: 35.9 %
HGB BLD-MCNC: 12.8 G/DL
IMM GRANULOCYTES # BLD AUTO: 0.01 X10(3) UL (ref 0–1)
IMM GRANULOCYTES NFR BLD: 0.2 %
LYMPHOCYTES # BLD AUTO: 1.48 X10(3) UL (ref 1–4)
LYMPHOCYTES NFR BLD AUTO: 30 %
MCH RBC QN AUTO: 32.2 PG (ref 26–34)
MCHC RBC AUTO-ENTMCNC: 35.7 G/DL (ref 31–37)
MCV RBC AUTO: 90.4 FL
MONOCYTES # BLD AUTO: 0.47 X10(3) UL (ref 0.1–1)
MONOCYTES NFR BLD AUTO: 9.5 %
NEUTROPHILS # BLD AUTO: 2.76 X10 (3) UL (ref 1.5–7.7)
NEUTROPHILS # BLD AUTO: 2.76 X10(3) UL (ref 1.5–7.7)
NEUTROPHILS NFR BLD AUTO: 55.9 %
OSMOLALITY SERPL CALC.SUM OF ELEC: 279 MOSM/KG (ref 275–295)
PLATELET # BLD AUTO: 229 10(3)UL (ref 150–450)
POTASSIUM SERPL-SCNC: 4.2 MMOL/L (ref 3.5–5.1)
PROT SERPL-MCNC: 6.9 G/DL (ref 5.7–8.2)
RBC # BLD AUTO: 3.97 X10(6)UL
SODIUM SERPL-SCNC: 134 MMOL/L (ref 136–145)
WBC # BLD AUTO: 4.9 X10(3) UL (ref 4–11)

## 2024-11-15 PROCEDURE — 99214 OFFICE O/P EST MOD 30 MIN: CPT | Performed by: INTERNAL MEDICINE

## 2024-11-15 PROCEDURE — 96374 THER/PROPH/DIAG INJ IV PUSH: CPT

## 2024-11-15 NOTE — PROGRESS NOTES
Pt here for Zometa . Pt denies any issues or concerns.      Ordering Provider: Dr. Mayorga  Order Exp: 11/15/25     Pt tolerated infusion without difficulty or complaint. Reviewed next apt date/time: Yes - 5/16/25 for PL/Dr. Mayorga/Liz      Education Record  Learner:  Patient  Disease / Diagnosis: Breast Ca  Barriers / Limitations:  None  Method:  Brief focused and Reinforcement  General Topics:  Medication, Side effects and symptom management, and Plan of care reviewed  Outcome:  Shows understanding

## 2024-11-15 NOTE — PROGRESS NOTES
Education Record    Learner:  Patient    Disease / Diagnosis:left breast cancer   S/p lumpectomy with SLN biopsy     Barriers / Limitations:  None   Comments:    Method:  Discussion   Comments:    General Topics:  Plan of care reviewed   Comments:    Outcome:  Shows understanding   Comments:   Breast imaging up to date, alternating MRI/ mammogram.   Taking anastrozole. Denies side effects. No problems after zometa infusions   BP high today, emotional due to recent loss of WENDY to cancer.   Emotional support given.   Had an episode of dehydration, tx in the ED. Will discuss new BP med with PCP.   Lymphadema is better.   Some weight gain.   Referral to weight loss clinic.

## 2024-11-27 ENCOUNTER — LAB ENCOUNTER (OUTPATIENT)
Dept: LAB | Age: 54
End: 2024-11-27
Attending: INTERNAL MEDICINE
Payer: COMMERCIAL

## 2024-11-27 DIAGNOSIS — Z13.29 SCREENING FOR THYROID DISORDER: ICD-10-CM

## 2024-11-27 DIAGNOSIS — Z00.00 ROUTINE GENERAL MEDICAL EXAMINATION AT A HEALTH CARE FACILITY: ICD-10-CM

## 2024-11-27 DIAGNOSIS — R17 ELEVATED BILIRUBIN: ICD-10-CM

## 2024-11-27 DIAGNOSIS — Z13.220 SCREENING FOR LIPID DISORDERS: ICD-10-CM

## 2024-11-27 LAB
ALBUMIN SERPL-MCNC: 4.4 G/DL (ref 3.2–4.8)
ALP LIVER SERPL-CCNC: 56 U/L
ALT SERPL-CCNC: 17 U/L
AST SERPL-CCNC: 25 U/L (ref ?–34)
BILIRUB DIRECT SERPL-MCNC: 0.3 MG/DL (ref ?–0.3)
BILIRUB SERPL-MCNC: 1.1 MG/DL (ref 0.3–1.2)
CHOLEST SERPL-MCNC: 195 MG/DL (ref ?–200)
FASTING PATIENT LIPID ANSWER: YES
HDLC SERPL-MCNC: 86 MG/DL (ref 40–59)
LDLC SERPL CALC-MCNC: 99 MG/DL (ref ?–100)
NONHDLC SERPL-MCNC: 109 MG/DL (ref ?–130)
PROT SERPL-MCNC: 7 G/DL (ref 5.7–8.2)
TRIGL SERPL-MCNC: 50 MG/DL (ref 30–149)
TSI SER-ACNC: 1.43 UIU/ML (ref 0.55–4.78)
VLDLC SERPL CALC-MCNC: 8 MG/DL (ref 0–30)

## 2024-11-27 PROCEDURE — 80061 LIPID PANEL: CPT | Performed by: INTERNAL MEDICINE

## 2024-11-27 PROCEDURE — 84443 ASSAY THYROID STIM HORMONE: CPT | Performed by: INTERNAL MEDICINE

## 2024-12-02 ENCOUNTER — OFFICE VISIT (OUTPATIENT)
Dept: INTERNAL MEDICINE CLINIC | Facility: CLINIC | Age: 54
End: 2024-12-02
Payer: COMMERCIAL

## 2024-12-02 ENCOUNTER — TELEPHONE (OUTPATIENT)
Dept: INTERNAL MEDICINE CLINIC | Facility: CLINIC | Age: 54
End: 2024-12-02

## 2024-12-02 VITALS
WEIGHT: 174 LBS | SYSTOLIC BLOOD PRESSURE: 138 MMHG | DIASTOLIC BLOOD PRESSURE: 72 MMHG | TEMPERATURE: 97 F | RESPIRATION RATE: 16 BRPM | HEART RATE: 65 BPM | OXYGEN SATURATION: 98 % | BODY MASS INDEX: 34.16 KG/M2 | HEIGHT: 60 IN

## 2024-12-02 DIAGNOSIS — F41.9 ANXIETY: ICD-10-CM

## 2024-12-02 DIAGNOSIS — Z17.0 CARCINOMA OF OVERLAPPING SITES OF LEFT BREAST IN FEMALE, ESTROGEN RECEPTOR POSITIVE (HCC): ICD-10-CM

## 2024-12-02 DIAGNOSIS — I89.0 LYMPHEDEMA OF LEFT ARM: ICD-10-CM

## 2024-12-02 DIAGNOSIS — C50.812 CARCINOMA OF OVERLAPPING SITES OF LEFT BREAST IN FEMALE, ESTROGEN RECEPTOR POSITIVE (HCC): ICD-10-CM

## 2024-12-02 DIAGNOSIS — J45.20 MILD INTERMITTENT ASTHMA WITHOUT COMPLICATION (HCC): ICD-10-CM

## 2024-12-02 DIAGNOSIS — Z17.0 MALIGNANT NEOPLASM OF OVERLAPPING SITES OF LEFT BREAST IN FEMALE, ESTROGEN RECEPTOR POSITIVE (HCC): Primary | ICD-10-CM

## 2024-12-02 DIAGNOSIS — C50.812 MALIGNANT NEOPLASM OF OVERLAPPING SITES OF LEFT BREAST IN FEMALE, ESTROGEN RECEPTOR POSITIVE (HCC): Primary | ICD-10-CM

## 2024-12-02 DIAGNOSIS — Z00.00 ROUTINE GENERAL MEDICAL EXAMINATION AT A HEALTH CARE FACILITY: Primary | ICD-10-CM

## 2024-12-02 DIAGNOSIS — Z23 NEED FOR INFLUENZA VACCINATION: ICD-10-CM

## 2024-12-02 DIAGNOSIS — I10 ESSENTIAL HYPERTENSION: ICD-10-CM

## 2024-12-02 RX ORDER — ALPRAZOLAM 0.25 MG/1
0.25 TABLET ORAL NIGHTLY PRN
Qty: 10 TABLET | Refills: 0 | Status: SHIPPED | OUTPATIENT
Start: 2024-12-02

## 2024-12-02 NOTE — TELEPHONE ENCOUNTER
Patient was seen today and came up front asking for us to send in a prescription for lymphedema garnment/sleeve for her L arm to [facility]-fax [phone] -ATTN: documentation department    Patient states we have done this before every 6 months for her.

## 2024-12-02 NOTE — PROGRESS NOTES
Chief Complaint   Patient presents with    Physical     Rm 4 SS  Reviewed Preventative/Wellness form with patient.         HPI:    Pleasant patient with h/o left breast cancer, LUE lymphedema, HTN, anxiety here for CPE  She is feeling well, lymphedema has improved. Tolerating anastrozole.  Utd on mammogram  Anxiety overall controlled, rarely uses xanax 0.25mg prn. No depression reported  HTN is controlled, no cardiac complaints. Lipids favorable  Asthma is well controlled, rarely uses albuterol prn. ACT 25  Follows with gyne and up to date on pap smear. Next colonoscopy due 2028  She will take flu vaccine today and shingrix at next OV    Review of Systems   Constitutional: Negative for fever  HENT: Negative for hearing loss, congestion  Eyes: Negative for pain and visual disturbance.   Respiratory: Negative for cough, chest tightness  Cardiovascular: Negative for chest pain  Gastrointestinal: Negative for nausea, vomiting  Genitourinary: Negative for dysuria, hematuria   Skin: Negative for color change and rash.   Neurological: Negative for dizziness, syncope  Hematological: Negative for adenopathy.   Psychiatric/Behavioral: No depression.    Patient Active Problem List   Diagnosis    Malignant neoplasm of overlapping sites of left breast in female, estrogen receptor positive (HCC)    Regional lymph node metastasis present (HCC)    Antineoplastic chemotherapy induced anemia    Lymphedema of left arm    Breast asymmetry    Aromatase inhibitor use    Seasonal allergies    Rosacea    Mild intermittent asthma without complication (HCC)    Essential hypertension    Anxiety    Chronic kidney disease, stage 3a (HCC)    Carcinoma of overlapping sites of left breast in female, estrogen receptor positive (HCC)       Past Medical History:    Anxiety    Asthma (HCC)    Breast cancer (HCC)    Left breast    Bronchitis    Ductal carcinoma in situ of breast    Exposure to medical diagnostic radiation    2/24/2022    Hypertension     Migraines    Personal history of antineoplastic chemotherapy    last treatment 12/2021    PONV (postoperative nausea and vomiting)    Visual impairment    glasses for reading     Past Surgical History:   Procedure Laterality Date    Chemotherapy      Colonoscopy      Inject nerv blck,axillary nerv      Lumpectomy left Left 05/28/2021    IDC;DCIS    Other      sinus surgery x2    Other surgical history      Lymph node transfer    Radiation left      Reduction of large breast Right 06/2023    Reduction right      2023    Tonsillectomy      Us breast biopsy 1 site left (cpt=19083) Left 05/2021    DCIS;IDC     Family History   Problem Relation Age of Onset    DCIS Self 52    Breast Cancer Self 52    Breast Cancer Mother 40    Hypertension Father     Cancer Brother         skin     Breast Cancer Maternal Grandmother 57    Cancer Paternal Grandmother 80        stomach     Cancer Maternal Uncle         prostate    Cancer Paternal Uncle         prostate    Breast Cancer Maternal Cousin Female 58     Social History     Socioeconomic History    Marital status: Single   Tobacco Use    Smoking status: Never    Smokeless tobacco: Never   Vaping Use    Vaping status: Never Used   Substance and Sexual Activity    Alcohol use: Yes     Comment: couple times per week    Drug use: Never    Sexual activity: Not Currently     Social Drivers of Health     Financial Resource Strain: Low Risk  (6/26/2024)    Received from [facility]    Overall Financial Resource Strain (CARDIA)     Difficulty of Paying Living Expenses: Not hard at all   Food Insecurity: No Food Insecurity (6/26/2024)    Received from [facility]    Hunger Vital Sign     Worried About Running Out of Food in the Last Year: Never true     Ran Out of Food in the Last Year: Never true   Transportation Needs: No Transportation Needs (6/26/2024)    Received from [facility]    PRAPARE - Transportation     Lack of  Transportation (Medical): No     Lack of Transportation (Non-Medical): No   Physical Activity: Sufficiently Active (6/26/2024)    Received from [facility]    Exercise Vital Sign     Days of Exercise per Week: 5 days     Minutes of Exercise per Session: 50 min   Stress: Patient Declined (6/26/2024)    Received from [facility]    [employer] [place] [employer] - Occupational Stress Questionnaire     Feeling of Stress : Patient declined   Social Connections: Unknown (6/26/2024)    Received from [facility]    Social Connection and Isolation Panel [NHANES]     Frequency of Communication with Friends and Family: More than three times a week     Frequency of Social Gatherings with Friends and Family: More than three times a week     Attends Restorationist Services: Patient declined     Active Member of Clubs or Organizations: Patient declined     Attends Club or Organization Meetings: Patient declined     Marital Status: Patient declined   Housing Stability: Low Risk  (6/26/2024)    Received from [facility]    Housing Stability Vital Sign     Unable to Pay for Housing in the Last Year: No     Number of Places Lived in the Last Year: 1     Unstable Housing in the Last Year: No       Current Outpatient Medications   Medication Sig Dispense Refill    ALPRAZolam 0.25 MG Oral Tab Take 1 tablet (0.25 mg total) by mouth nightly as needed for Anxiety. 10 tablet 0    anastrozole 1 MG Oral Tab tab Take 1 tablet (1 mg total) by mouth daily. 90 tablet 1    losartan-hydroCHLOROthiazide 50-12.5 MG Oral Tab Take 1 tablet by mouth daily. 90 tablet 3    albuterol (PROAIR HFA) 108 (90 Base) MCG/ACT Inhalation Aero Soln Inhale 2 puffs into the lungs every 4 (four) hours as needed for Wheezing or Shortness of Breath (cough). 1 each 1    Cetirizine HCl (ZYRTEC ALLERGY) 10 MG Oral Cap Take 1 capsule by mouth daily.      Fluticasone Propionate 50 MCG/ACT Nasal  Suspension 2 sprays by Nasal route daily.      mupirocin 2 % External Ointment Apply 1 Application topically 2 (two) times daily. (Patient not taking: Reported on 12/2/2024) 22 g 1    ondansetron (ZOFRAN) 8 MG tablet Take 1 tablet (8 mg total) by mouth every 8 (eight) hours as needed for Nausea. (Patient not taking: Reported on 12/2/2024) 30 tablet 3       Allergies  Allergies[1]    Health Maintenance  Immunizations:  Immunization History   Administered Date(s) Administered    Covid-19 Vaccine Moderna 100 mcg/0.5 ml 02/10/2021, 03/11/2021, 09/07/2021    FLU VAC QIV SPLIT 3 YRS AND OLDER (20917) 09/16/2019    FLUZONE 6 months and older PFS 0.5 ml (79264) 11/11/2014, 09/16/2019, 12/07/2020, 11/28/2023    Flucelvax Influenza vaccine, trivalent (ccIIV3), 0.5mL IM 09/30/2017    Flulaval, 3 Years & >, IM 09/30/2017    Influenza 11/11/2014, 11/11/2014, 11/11/2015, 11/11/2015, 09/30/2017, 10/05/2018, 10/05/2018    Influenza Vaccine, trivalent (IIV3), PF 0.5mL (95189) 12/02/2024    Moderna Covid-19 Vaccine 50mcg/0.5ml 12yrs+ 12/30/2023    Pneumococcal Conjugate PCV20 11/28/2023    Pneumovax 23 04/19/2019    TDAP 02/06/2013, 05/02/2023    Tb Intradermal Test 06/29/2007, 06/29/2007, 07/26/2011, 07/26/2011         Physical Exam  /72   Pulse 65   Temp 97 °F (36.1 °C) (Temporal)   Resp 16   Ht 5' (1.524 m)   Wt 174 lb (78.9 kg)   SpO2 98%   BMI 33.98 kg/m²   Constitutional: Oriented to person, place, and time. No distress.   HEENT:  Normocephalic and atraumatic. Hearing and tympanic membranes normal.   Eyes: Conjunctivae and EOM are normal. PERRLA. No scleral icterus.   Neck: Normal range of motion. Neck supple.   Breasts: no skin changes, no masses or LAD  Cardiovascular: Normal rate, regular rhythm and intact distal pulses.    Pulmonary/Chest: Effort normal and breath sounds normal.   Abdominal: Soft. Bowel sounds are normal. Non tender, ND  Neurological: No cranial nerve deficit or sensory deficit. Normal muscle  tone.   Skin: Skin is warm and dry.   Psychiatric: Normal mood and affect.     A/P:    Encounter Diagnoses   Name     Routine general medical examination at a health care facility- flu vaccine given today, she is up to date on pap, mammogram and colonoscopy. She will take shingrix at next OV. Encouraged heart healthy diet and regular exercise.     Essential hypertension- controlled, CPM          Anxiety- doing well, rarely uses xanax prn     Mild intermittent asthma without complication (HCC)- ACT 25, reviewed and agree with ACT and AAP     Carcinoma of overlapping sites of left breast in female, estrogen receptor positive (HCC)- continue anastrozole as per Dr. Mayorga, she is up to date on mammogram and MRI breast scheduled     Need for influenza vaccination        Orders Placed This Encounter   Procedures    Fluzone trivalent vaccine, PF 0.5mL, 6mo+ (15568)       Meds & Refills for this Visit:  Requested Prescriptions     Signed Prescriptions Disp Refills    ALPRAZolam 0.25 MG Oral Tab 10 tablet 0     Sig: Take 1 tablet (0.25 mg total) by mouth nightly as needed for Anxiety.       Imaging & Consults:  INFLUENZA VACCINE, TRI, PRESERV FREE, 0.5 ML      Return in about 6 months (around 6/2/2025), or if symptoms worsen or fail to improve, for chronic issues, bp check.    There are no Patient Instructions on file for this visit.    All questions were answered and the patient understands the plan.                [1]   Allergies  Allergen Reactions    B Complex-C [B-Plex] FACE FLUSHING    Benzoyl Peroxide SWELLING and OTHER (SEE COMMENTS)     Eyes swell shut, skin oozes    Erythromycin NAUSEA AND VOMITING    Dust Mites UNKNOWN     .    Mold UNKNOWN     .

## 2024-12-06 ENCOUNTER — HOSPITAL ENCOUNTER (OUTPATIENT)
Dept: MAMMOGRAPHY | Facility: HOSPITAL | Age: 54
Discharge: HOME | End: 2024-12-06
Attending: SURGERY
Payer: COMMERCIAL

## 2024-12-06 DIAGNOSIS — C50.812 CARCINOMA OF OVERLAPPING SITES OF LEFT BREAST IN FEMALE, ESTROGEN RECEPTOR POSITIVE (HCC): ICD-10-CM

## 2024-12-06 DIAGNOSIS — Z17.0 CARCINOMA OF OVERLAPPING SITES OF LEFT BREAST IN FEMALE, ESTROGEN RECEPTOR POSITIVE (HCC): ICD-10-CM

## 2024-12-06 PROCEDURE — 77061 BREAST TOMOSYNTHESIS UNI: CPT | Performed by: SURGERY

## 2024-12-06 PROCEDURE — 77065 DX MAMMO INCL CAD UNI: CPT | Performed by: SURGERY

## 2024-12-07 ENCOUNTER — HOSPITAL ENCOUNTER (OUTPATIENT)
Dept: MRI IMAGING | Facility: HOSPITAL | Age: 54
Discharge: HOME | End: 2024-12-07
Attending: SURGERY
Payer: COMMERCIAL

## 2024-12-07 DIAGNOSIS — Z17.0 CARCINOMA OF OVERLAPPING SITES OF LEFT BREAST IN FEMALE, ESTROGEN RECEPTOR POSITIVE (HCC): ICD-10-CM

## 2024-12-07 DIAGNOSIS — C50.812 CARCINOMA OF OVERLAPPING SITES OF LEFT BREAST IN FEMALE, ESTROGEN RECEPTOR POSITIVE (HCC): ICD-10-CM

## 2024-12-07 PROCEDURE — A9575 INJ GADOTERATE MEGLUMI 0.1ML: HCPCS | Performed by: SURGERY

## 2024-12-07 PROCEDURE — 77049 MRI BREAST C-+ W/CAD BI: CPT | Performed by: SURGERY

## 2024-12-07 RX ORDER — GADOTERATE MEGLUMINE 376.9 MG/ML
20 INJECTION INTRAVENOUS
Status: COMPLETED | OUTPATIENT
Start: 2024-12-07 | End: 2024-12-07

## 2024-12-07 RX ADMIN — GADOTERATE MEGLUMINE 16 ML: 376.9 INJECTION INTRAVENOUS at 12:08:00

## 2024-12-11 ENCOUNTER — MED REC SCAN ONLY (OUTPATIENT)
Dept: INTERNAL MEDICINE CLINIC | Facility: CLINIC | Age: 54
End: 2024-12-11

## 2024-12-12 NOTE — TELEPHONE ENCOUNTER
Faxed prescription for medical compression garments from [facility] [employer] to [phone]. [facility] [phone]. Received confirmation. Sent to scanning

## 2024-12-18 ENCOUNTER — TELEPHONE (OUTPATIENT)
Dept: INTERNAL MEDICINE CLINIC | Facility: CLINIC | Age: 54
End: 2024-12-18

## 2024-12-18 NOTE — TELEPHONE ENCOUNTER
MAs- please print form from media tab 12/17/24 and provide to PCP as per [employer] requires top portion of form to be completed.   Once completed, please fax back @[phone]. Thank you.   fair minus

## 2024-12-18 NOTE — TELEPHONE ENCOUNTER
This is from 12/3  \"Faxed prescription for medical compression garments from [facility] to [phone]. [facility] [phone]. Received confirmation. Sent to scanning \"      I think this is the form in question, pretty sure it was completed and faxed.

## 2024-12-18 NOTE — TELEPHONE ENCOUNTER
Josefa from [facility] called  She states she has re faxed the DME for compression garments.   She is asking that the top portion of the form is filled out, they need a diagnosis and statement of medical necessity for this order.     Did the office receive this form again?    Call back number is [phone]

## 2025-01-14 ENCOUNTER — OFFICE VISIT (OUTPATIENT)
Dept: SURGERY | Facility: CLINIC | Age: 55
End: 2025-01-14
Payer: COMMERCIAL

## 2025-01-14 VITALS
OXYGEN SATURATION: 98 % | TEMPERATURE: 98 F | RESPIRATION RATE: 18 BRPM | HEART RATE: 77 BPM | DIASTOLIC BLOOD PRESSURE: 78 MMHG | SYSTOLIC BLOOD PRESSURE: 138 MMHG | BODY MASS INDEX: 35 KG/M2 | WEIGHT: 178.38 LBS

## 2025-01-14 DIAGNOSIS — C50.812 MALIGNANT NEOPLASM OF OVERLAPPING SITES OF LEFT BREAST (HCC): Primary | ICD-10-CM

## 2025-01-14 PROCEDURE — 99213 OFFICE O/P EST LOW 20 MIN: CPT | Performed by: SURGERY

## 2025-01-14 PROCEDURE — 3075F SYST BP GE 130 - 139MM HG: CPT | Performed by: SURGERY

## 2025-01-14 PROCEDURE — 3078F DIAST BP <80 MM HG: CPT | Performed by: SURGERY

## 2025-01-23 PROBLEM — C50.812 MALIGNANT NEOPLASM OF OVERLAPPING SITES OF LEFT BREAST (HCC): Status: ACTIVE | Noted: 2025-01-23

## 2025-01-23 NOTE — PROGRESS NOTES
Breast Surgery Surveillance    History of Present Illness:   Ms. Makeda Garduno is a 54 year old woman who presents with personal history of a prior left breast cancer.  This was taken care of remotely with a lumpectomy with removal of 8 lymph nodes (7 reportedly positive), chemotherapy and radiation.  She ultimately underwent a lymphovenous revascularization and cosmetic peel-off improved somewhat.  In June thousand 24.  She has a personal history of cysts in the breast remotely prior to this and she does have a family history of breast cancer but underwent genetic mutation testing that was unremarkable at the time of her diagnosis.  Acutely, she denies any palpable masses, nipple discharge, skin changes or concerns.  Her most recent imaging included a right diagnostic evaluation in December 2024 showed benign and coarse calcifications in the right breast as well as an MRI in December 2025 that showed postlumpectomy changes in the left breast as well as surgical changes in the right with no suspicious enhancement bilaterally.  She is here today for evaluation and recommendations for further therapy.        Past Medical History:    Anxiety    Asthma (HCC)    Breast cancer (HCC)    Left breast    Bronchitis    Ductal carcinoma in situ of breast    Exposure to medical diagnostic radiation    2/24/2022    Hypertension    Migraines    Personal history of antineoplastic chemotherapy    last treatment 12/2021    PONV (postoperative nausea and vomiting)    Visual impairment    glasses for reading       Past Surgical History:   Procedure Laterality Date    Chemotherapy      Colonoscopy      Inject nerv blck,axillary nerv      Lumpectomy left Left 05/28/2021    IDC;DCIS    Yahaira localization wire 1 site right (cpt=19281) Right 06/01/2023    Stromal fibrosis    Other      sinus surgery x2    Other surgical history      Lymph node transfer    Radiation left      Reduction of large breast Right 06/2023    Reduction right           Tonsillectomy      Us breast biopsy 1 site left (cpt=19083) Left 2021    DCIS;IDC       Gynecological History:  Pt is a   She achieved menarche at age 9 and LMP 3 years ago  Age of Menopause: 50  Type: natural  She denies any history of hormone replacement therapy.  She has history of oral contraceptive use for 10 years, last 23 years ago.  She denies infertility treatment to achieve pregnancy.    Medications:     ALPRAZolam 0.25 MG Oral Tab Take 1 tablet (0.25 mg total) by mouth nightly as needed for Anxiety. 10 tablet 0    anastrozole 1 MG Oral Tab tab Take 1 tablet (1 mg total) by mouth daily. 90 tablet 1    losartan-hydroCHLOROthiazide 50-12.5 MG Oral Tab Take 1 tablet by mouth daily. 90 tablet 3    albuterol (PROAIR HFA) 108 (90 Base) MCG/ACT Inhalation Aero Soln Inhale 2 puffs into the lungs every 4 (four) hours as needed for Wheezing or Shortness of Breath (cough). 1 each 1    mupirocin 2 % External Ointment Apply 1 Application topically 2 (two) times daily. 22 g 1    ondansetron (ZOFRAN) 8 MG tablet Take 1 tablet (8 mg total) by mouth every 8 (eight) hours as needed for Nausea. 30 tablet 3    Cetirizine HCl (ZYRTEC ALLERGY) 10 MG Oral Cap Take 1 capsule by mouth daily.      Fluticasone Propionate 50 MCG/ACT Nasal Suspension 2 sprays by Nasal route daily.         Allergies:    Allergies   Allergen Reactions    B Complex-C [B-Plex] FACE FLUSHING    Benzoyl Peroxide SWELLING and OTHER (SEE COMMENTS)     Eyes swell shut, skin oozes    Erythromycin NAUSEA AND VOMITING    Dust Mites UNKNOWN     .    Mold UNKNOWN     .       Family History:   Family History   Problem Relation Age of Onset    DCIS Self 54    Breast Cancer Self 52    Breast Cancer Mother 40    Hypertension Father     Cancer Brother         skin     Breast Cancer Maternal Grandmother 57    Cancer Paternal Grandmother 80        stomach     Cancer Maternal Uncle         prostate    Cancer Paternal Uncle         prostate    Breast  Cancer Maternal Cousin Female 58       She is not of Ashkenazi [place] ancestry.    Social History:  History   Alcohol Use    Yes     Comment: couple times per week       History   Smoking Status    Never   Smokeless Tobacco    Never     Ms. Makeda Garduno is Single with 0 children. She has 2 siblings. She is currently Employed full-time      Review of Systems:  General:   The patient denies, fever, chills, night sweats, +fatigue, generalized weakness, change in appetite or weight loss.    HEENT:     The patient denies eye irritation, cataracts, redness, glaucoma, yellowing of the eyes, change in vision or color blindness. The patient denies hearing loss, ringing in the ears, ear drainage, earaches, +nasal congestion, nose bleeds, snoring, pain in mouth/throat, hoarseness, change in voice, facial trauma. +contacts/glasses    Respiratory:  The patient denies chronic cough, phlegm, hemoptysis, pleurisy/chest pain, pneumonia, +asthma, wheezing, difficulty in breathing with exertion, emphysema, chronic bronchitis, shortness of breath or abnormal sound when breathing.     Cardiovascular:  There is no history of chest pain, chest pressure/discomfort, palpitations, irregular heartbeat, fainting or near-fainting, difficulty breathing when lying flat, SOB/Coughing at night, swelling of the legs or chest pain while walking.    Breasts:  See history of present illness    Gastrointestinal:     There is no history of difficulty or pain with swallowing, reflux symptoms, vomiting, dark or bloody stools, constipation, yellowing of the skin, indigestion, nausea, change in bowel habits, diarrhea, abdominal pain or vomiting blood.     Genitourinary:  The patient denies frequent urination, needing to get up at night to urinate, urinary hesitancy or retaining urine, painful urination, urinary incontinence, decreased urine stream, blood in the urine or vaginal/penile discharge.    Skin:    The patient denies rash, itching, skin  lesions, dry skin, change in skin color or +change in moles.     Hematologic/Lymphatic:  The patient denies easily bruising or bleeding or persistent swollen glands or lymph nodes.     Musculoskeletal:  The patient denies muscle aches/pain, joint pain, stiff joints, neck pain, back pain or bone pain.    Neuropsychiatric:  There is no history of migraines or severe headaches, seizure/epilepsy, speech problems, coordination problems, trembling/tremors, fainting/black outs, dizziness, memory problems, loss of sensation/numbness, problems walking, weakness, tingling or burning in hands/feet. There is no history of abusive relationship, bipolar disorder, +sleep disturbance, +anxiety, depression or feeling of despair.    Endocrine:    There is no history of poor/slow wound healing, weight loss/gain, fertility or hormone problems, cold intolerance, thyroid disease.     Allergic/Immunologic:  There is no history of hives, hay fever, angioedema or anaphylaxis.    /78 (BP Location: Right arm, Patient Position: Sitting, Cuff Size: adult)   Pulse 77   Temp 98.2 °F (36.8 °C) (Temporal)   Resp 18   Wt 80.9 kg (178 lb 6.4 oz)   SpO2 98%   BMI 34.84 kg/m²     Physical Exam:  The patient is an alert, oriented, well-nourished and  well-developed woman who appears her stated age. Her speech patterns and movements are normal. Her affect is appropriate.    HEENT: The head is normocephalic. The neck is supple. The thyroid is not enlarged and is without palpable masses/nodules. There are no palpable masses. The trachea is in the midline. Conjunctiva are clear, non-icteric.    Chest: The chest expands symmetrically. The lungs are clear to auscultation.    Heart: The rhythm is regular.  There are no murmurs, rubs, gallops or thrills.    Breasts:  Her breasts are asymmetrical right greater than left with a cup size 38D.  Right breast: The skin, nipple ,and areola appear normal. There is no skin dimpling with movement of the  pectoralis. There is no nipple retraction. No nipple discharge can be elicited. The parenchyma is mildly nodular. There are no dominant masses in the breast. The axillary tail is normal.  Left breast:   Evidence of swelling with no palpable chest wall nodules or visible concerning findings.  Abdomen:  The abdomen is soft, flat and non tender. The liver is not enlarged. There are no palpable masses.    Lymph Nodes:  The supraclavicular, axillary and cervical regions are free of significant lymphadenopathy.    Back: There is no vertebral column tenderness.    Skin: The skin appears normal. There are no suspicious appearing rashes or lesions.    Extremities: The extremities are without deformity, cyanosis or edema.    Impression:   Ms. Makeda Garduno is a 54 year old woman presents with personal history of  Cancer Staging   Malignant neoplasm of overlapping sites of left breast in female, estrogen receptor positive (HCC)  Staging form: Breast, AJCC 8th Edition  - Pathologic stage from 7/20/2021: Stage IB (pT2, pN2, cM0, G2, ER+, DE+, HER2-) - Signed by Jayjay Mayorga MD on 8/10/2021  - Clinical: No stage assigned - Unsigned    Discussion and Plan:  I had a discussion with the Patient regarding her breast exam. On exam today I found no clinical evidence of malignancy bilaterally. She will have her surveillance annual MRI given her history in December 2020 anticipate her bilateral diagnostic evaluation in June 2025.  She will clinically follow with me on an exam in the office in one year.   She was given ample opportunity for questions and those questions were answered to her satisfaction. She has been  encouraged to contact the office with any questions or concerns prior to her next appointment.     This encounter lasted a total of 25 minutes, more than 50% of which was dedicated to the discussion of management options.

## 2025-03-06 DIAGNOSIS — Z17.0 MALIGNANT NEOPLASM OF OVERLAPPING SITES OF LEFT BREAST IN FEMALE, ESTROGEN RECEPTOR POSITIVE (HCC): ICD-10-CM

## 2025-03-06 DIAGNOSIS — C50.812 MALIGNANT NEOPLASM OF OVERLAPPING SITES OF LEFT BREAST IN FEMALE, ESTROGEN RECEPTOR POSITIVE (HCC): ICD-10-CM

## 2025-03-06 RX ORDER — ANASTROZOLE 1 MG/1
1 TABLET ORAL DAILY
Qty: 90 TABLET | Refills: 1 | Status: SHIPPED | OUTPATIENT
Start: 2025-03-06

## 2025-05-14 NOTE — PROGRESS NOTES
Cancer Center Progress Note      Patient Name:  Makeda Garduno  YOB: 1970  Medical Record Number:  [id number]    Date of visit: 5/16/2025    CHIEF COMPLAINT: L breast ca s/p lumpectomy.    HPI:     55 year old that I have followed since  7/21. S/p L lumpectomy/ALND 6/21.  Path-4 cm grade 2 IDC, 7+ LN.  Invasion into perinodal adipose tissue with extensive LVI.  No distant disease on staging.  Received DD AC-weekly  T from 7/28/21- 12/10/21. Completed RT 2/24/22.  Anastrozole started 3/22. Abemaciclib started 4/22., stopped 4/23.  Presents for evaluation.    More allergies. No bone pain. Stable low back pain.     SOCIAL HISTORY:    Single, elementary school .  No children.    ROS:     Constitutional: Fatigue, mild nausea.  Neurologic: no headache, seizures, diplopia or weakness  Respiratory: no cough, SOB or hemoptysis  Cardiovascular: no chest pain, ankle swelling, JULIAN  GI: no nausea, vomiting, diarrhea or BRBPR  Musculoskeletal: no body-ache or joint pain  Dermatologic: Rash on neck  : no hematuria, dysuria or frequency  Psych: no confusion or depression   Heme: no easy bruising or bleeding     ALLERGIES:    Allergies   Allergen Reactions    B Complex-C [B-Plex] FACE FLUSHING    Benzoyl Peroxide SWELLING and OTHER (SEE COMMENTS)     Eyes swell shut, skin oozes    Erythromycin NAUSEA AND VOMITING    Dust Mites UNKNOWN     .    Mold UNKNOWN     .       MEDICATIONS:    Current Outpatient Medications   Medication Sig Dispense Refill    anastrozole 1 MG Oral Tab tab Take 1 tablet (1 mg total) by mouth daily. 90 tablet 1    ALPRAZolam 0.25 MG Oral Tab Take 1 tablet (0.25 mg total) by mouth nightly as needed for Anxiety. 10 tablet 0    losartan-hydroCHLOROthiazide 50-12.5 MG Oral Tab Take 1 tablet by mouth daily. 90 tablet 3    albuterol (PROAIR HFA) 108 (90 Base) MCG/ACT Inhalation Aero Soln Inhale 2 puffs into the lungs every 4 (four) hours as needed for Wheezing or Shortness  of Breath (cough). 1 each 1    mupirocin 2 % External Ointment Apply 1 Application topically 2 (two) times daily. 22 g 1    ondansetron (ZOFRAN) 8 MG tablet Take 1 tablet (8 mg total) by mouth every 8 (eight) hours as needed for Nausea. 30 tablet 3    Cetirizine HCl (ZYRTEC ALLERGY) 10 MG Oral Cap Take 1 capsule by mouth in the morning.      Fluticasone Propionate 50 MCG/ACT Nasal Suspension 2 sprays by Nasal route in the morning.         VITALS: Height: 152.4 cm (5') (1025)  Weight: 80.1 kg (176 lb 9.6 oz) (1025)  BSA (Calculated - sq m): 1.77 sq meters (1025)  Pulse: 75 (1025)  BP: 135/86 (1025)  Temp: 98.2 °F (36.8 °C) (1025)  Do Not Use - Resp Rate: --  SpO2: 97 % (1025)      PS:  ECO    PHYSICAL EXAM:    General: alert and oriented x 3, not in acute distress.  HEENT: JUDY, oropharynx  Clear. Pallor.   Neck: supple.  No JVD /adenopathy.  CVS: S1S2, regular  Rhythm, no murmurs.   Lungs: Clear to auscultation and percussion.  Abdomen: Soft, non tender, no hepato-splenomegaly.  Extremities:  No edema.  CNS: no focal deficit  Breast exam: Right breast is soft, no masses.  Left breast shows well-healed lumpectomy scar.  No masses or axillary adenopathy.      Emotional well being: Patient's emotional well being was assessed.  No issues requiring acute psychosocial intervention were identified.     LABS:     Recent Results (from the past 24 hours)   Comp Metabolic Panel (14)    Collection Time: 25 10:17 AM   Result Value Ref Range    Glucose 112 (H) 70 - 99 mg/dL    Sodium 135 (L) 136 - 145 mmol/L    Potassium 4.3 3.5 - 5.1 mmol/L    Chloride 104 98 - 112 mmol/L    CO2 24.0 21.0 - 32.0 mmol/L    Anion Gap 7 0 - 18 mmol/L    BUN 16 9 - 23 mg/dL    Creatinine 0.90 0.55 - 1.02 mg/dL    Calcium, Total 9.5 8.7 - 10.6 mg/dL    Calculated Osmolality 282 275 - 295 mOsm/kg    eGFR-Cr 75 >=60 mL/min/1.73m2    AST 23 <34 U/L    ALT 15 10 - 49 U/L    Alkaline Phosphatase 63 46 -  118 U/L    Bilirubin, Total 1.1 0.3 - 1.2 mg/dL    Total Protein 6.9 5.7 - 8.2 g/dL    Albumin 4.5 3.2 - 4.8 g/dL    Globulin  2.4 2.0 - 3.5 g/dL    A/G Ratio 1.9 1.0 - 2.0    Patient Fasting for CMP? Patient not present    CBC With Differential With Platelet    Collection Time: 05/16/25 10:17 AM   Result Value Ref Range    WBC 5.6 4.0 - 11.0 x10(3) uL    RBC 3.89 3.80 - 5.30 x10(6)uL    HGB 12.6 12.0 - 16.0 g/dL    HCT 35.0 35.0 - 48.0 %    .0 150.0 - 450.0 10(3)uL    MCV 90.0 80.0 - 100.0 fL    MCH 32.4 26.0 - 34.0 pg    MCHC 36.0 31.0 - 37.0 g/dL    RDW 12.2 %    Neutrophil Absolute Prelim 3.21 1.50 - 7.70 x10 (3) uL    Neutrophil Absolute 3.21 1.50 - 7.70 x10(3) uL    Lymphocyte Absolute 1.57 1.00 - 4.00 x10(3) uL    Monocyte Absolute 0.51 0.10 - 1.00 x10(3) uL    Eosinophil Absolute 0.26 0.00 - 0.70 x10(3) uL    Basophil Absolute 0.03 0.00 - 0.20 x10(3) uL    Immature Granulocyte Absolute 0.02 0.00 - 1.00 x10(3) uL    Neutrophil % 57.4 %    Lymphocyte % 28.0 %    Monocyte % 9.1 %    Eosinophil % 4.6 %    Basophil % 0.5 %    Immature Granulocyte % 0.4 %   Vitamin D, 25-Hydroxy    Collection Time: 05/16/25 10:17 AM   Result Value Ref Range    Vitamin D, 25OH, Total 40.3 30.0 - 100.0 ng/mL         ASSESSMENT AND PLAN:     # L breast ca  (T2 N2 Mx):  s/p L lumpectomy/ALND 6/21 4 cm gr 2 IDC, 7+ LN, invasion into perinodal adipose tissue with extensive LVI, 100% ER, 80% DC, HER-2 neg, Ki-67 40%.      DD AC-weekly T from 7/21-12/21 and RT 2/22.    Anastrozole started 3/22, continue till 3/32.   Abemaciclib started 4/22-4/23, discontinued for diarrhea. Started adjuvant zoledronic acid 5/22. Continue q 6 months x 5 years per ASCO guidelines till 11/26. ONJ risk discussed.  Up-to-date on dentist visit.      DEXA 4/24 was normal, repeat 4/26.  CT and bone scan 5/22-NELDA.  Nonspecific groundglass density left lung apex probably due to RT.  Port-A-Cath removed 7/22.     R mammo 12/24 ok. Blas due 6/25.  Breast MRI  12/24 okay. Repeat 12/25.     # Lymphedema: Underwent LD myocutaneous flap with vascular lymph node transplant to L axilla at [facility] 6/24, significant improvement in symptoms.    # Vit D defi: Resolved.    # Hyponatremia: Mild and stable.  Noted since 2022 but more seen this year.  Possibly due to HCTZ which she takes with losartan.  Follow for now.    # Liver lesion : noted on CT 12/21-8 mm, also noted on CT outside facility 6/21. Repeat CT 5/22-unchanged.     # Elevated bilirubin: resolved.    Survivorship issues were addressed with the patient.  No issues were identified by the patient.     ORDERS PLACED:        Procedures    Vitamin D, 25-Hydroxy    CBC With Differential With Platelet    Comp Metabolic Panel (14)       Return in about 6 months (around 11/16/2025) for Labs, MD visit, Zometa Infusion..    Francesca Mayorga M.D.    [facility]  [address] [place], [address]      5/16/2025

## 2025-05-16 ENCOUNTER — OFFICE VISIT (OUTPATIENT)
Age: 55
End: 2025-05-16
Attending: INTERNAL MEDICINE
Payer: COMMERCIAL

## 2025-05-16 VITALS
BODY MASS INDEX: 34.68 KG/M2 | OXYGEN SATURATION: 97 % | TEMPERATURE: 98 F | HEART RATE: 75 BPM | RESPIRATION RATE: 16 BRPM | SYSTOLIC BLOOD PRESSURE: 135 MMHG | DIASTOLIC BLOOD PRESSURE: 86 MMHG | HEIGHT: 60 IN | WEIGHT: 176.63 LBS

## 2025-05-16 DIAGNOSIS — C50.812 MALIGNANT NEOPLASM OF OVERLAPPING SITES OF LEFT BREAST IN FEMALE, ESTROGEN RECEPTOR POSITIVE (HCC): ICD-10-CM

## 2025-05-16 DIAGNOSIS — Z79.811 AROMATASE INHIBITOR USE: ICD-10-CM

## 2025-05-16 DIAGNOSIS — C77.9 REGIONAL LYMPH NODE METASTASIS PRESENT (HCC): Primary | ICD-10-CM

## 2025-05-16 DIAGNOSIS — Z17.0 MALIGNANT NEOPLASM OF OVERLAPPING SITES OF LEFT BREAST IN FEMALE, ESTROGEN RECEPTOR POSITIVE (HCC): ICD-10-CM

## 2025-05-16 DIAGNOSIS — C77.9 REGIONAL LYMPH NODE METASTASIS PRESENT (HCC): ICD-10-CM

## 2025-05-16 LAB
ALBUMIN SERPL-MCNC: 4.5 G/DL (ref 3.2–4.8)
ALBUMIN/GLOB SERPL: 1.9 {RATIO} (ref 1–2)
ALP LIVER SERPL-CCNC: 63 U/L (ref 46–118)
ALT SERPL-CCNC: 15 U/L (ref 10–49)
ANION GAP SERPL CALC-SCNC: 7 MMOL/L (ref 0–18)
AST SERPL-CCNC: 23 U/L (ref ?–34)
BASOPHILS # BLD AUTO: 0.03 X10(3) UL (ref 0–0.2)
BASOPHILS NFR BLD AUTO: 0.5 %
BILIRUB SERPL-MCNC: 1.1 MG/DL (ref 0.3–1.2)
BUN BLD-MCNC: 16 MG/DL (ref 9–23)
CALCIUM BLD-MCNC: 9.5 MG/DL (ref 8.7–10.6)
CHLORIDE SERPL-SCNC: 104 MMOL/L (ref 98–112)
CO2 SERPL-SCNC: 24 MMOL/L (ref 21–32)
CREAT BLD-MCNC: 0.9 MG/DL (ref 0.55–1.02)
EGFRCR SERPLBLD CKD-EPI 2021: 75 ML/MIN/1.73M2 (ref 60–?)
EOSINOPHIL # BLD AUTO: 0.26 X10(3) UL (ref 0–0.7)
EOSINOPHIL NFR BLD AUTO: 4.6 %
ERYTHROCYTE [DISTWIDTH] IN BLOOD BY AUTOMATED COUNT: 12.2 %
GLOBULIN PLAS-MCNC: 2.4 G/DL (ref 2–3.5)
GLUCOSE BLD-MCNC: 112 MG/DL (ref 70–99)
HCT VFR BLD AUTO: 35 % (ref 35–48)
HGB BLD-MCNC: 12.6 G/DL (ref 12–16)
IMM GRANULOCYTES # BLD AUTO: 0.02 X10(3) UL (ref 0–1)
IMM GRANULOCYTES NFR BLD: 0.4 %
LYMPHOCYTES # BLD AUTO: 1.57 X10(3) UL (ref 1–4)
LYMPHOCYTES NFR BLD AUTO: 28 %
MCH RBC QN AUTO: 32.4 PG (ref 26–34)
MCHC RBC AUTO-ENTMCNC: 36 G/DL (ref 31–37)
MCV RBC AUTO: 90 FL (ref 80–100)
MONOCYTES # BLD AUTO: 0.51 X10(3) UL (ref 0.1–1)
MONOCYTES NFR BLD AUTO: 9.1 %
NEUTROPHILS # BLD AUTO: 3.21 X10 (3) UL (ref 1.5–7.7)
NEUTROPHILS # BLD AUTO: 3.21 X10(3) UL (ref 1.5–7.7)
NEUTROPHILS NFR BLD AUTO: 57.4 %
OSMOLALITY SERPL CALC.SUM OF ELEC: 282 MOSM/KG (ref 275–295)
PLATELET # BLD AUTO: 208 10(3)UL (ref 150–450)
POTASSIUM SERPL-SCNC: 4.3 MMOL/L (ref 3.5–5.1)
PROT SERPL-MCNC: 6.9 G/DL (ref 5.7–8.2)
RBC # BLD AUTO: 3.89 X10(6)UL (ref 3.8–5.3)
SODIUM SERPL-SCNC: 135 MMOL/L (ref 136–145)
VIT D+METAB SERPL-MCNC: 40.3 NG/ML (ref 30–100)
WBC # BLD AUTO: 5.6 X10(3) UL (ref 4–11)

## 2025-05-16 NOTE — PROGRESS NOTES
Education Record    Learner:  Patient    Disease / Diagnosis:    Barriers / Limitations:  None   Comments:    Method:  Discussion   Comments:    General Topics:  Medication and Plan of care reviewed   Comments:    Outcome:  Shows understanding   Comments:    Zometa infused without incident.     Next appointment in 6 months requested. Patient uses mychart.     Patient discharged in stable condition.

## 2025-05-16 NOTE — PROGRESS NOTES
Education Record    Learner:  Patient    Disease / Diagnosis:left breast cancer       Barriers / Limitations:  None   Comments:    Method:  Discussion   Comments:    General Topics:  Plan of care reviewed   Comments:    Outcome:  Shows understanding   Comments:   Alternating MRI and mammogram,   Taking anastrozole.   No dental issues, and up to date on cleaning.

## 2025-06-09 ENCOUNTER — HOSPITAL ENCOUNTER (OUTPATIENT)
Dept: MAMMOGRAPHY | Facility: HOSPITAL | Age: 55
Discharge: HOME | End: 2025-06-09
Attending: SURGERY
Payer: COMMERCIAL

## 2025-06-09 DIAGNOSIS — C50.812 MALIGNANT NEOPLASM OF OVERLAPPING SITES OF LEFT BREAST (HCC): ICD-10-CM

## 2025-06-09 PROCEDURE — 77062 BREAST TOMOSYNTHESIS BI: CPT | Performed by: SURGERY

## 2025-06-09 PROCEDURE — 77066 DX MAMMO INCL CAD BI: CPT | Performed by: SURGERY

## 2025-06-17 DIAGNOSIS — C50.812 MALIGNANT NEOPLASM OF OVERLAPPING SITES OF LEFT BREAST IN FEMALE, ESTROGEN RECEPTOR POSITIVE (HCC): ICD-10-CM

## 2025-06-17 DIAGNOSIS — Z17.0 MALIGNANT NEOPLASM OF OVERLAPPING SITES OF LEFT BREAST IN FEMALE, ESTROGEN RECEPTOR POSITIVE (HCC): ICD-10-CM

## 2025-06-17 RX ORDER — ANASTROZOLE 1 MG/1
1 TABLET ORAL DAILY
Qty: 90 TABLET | Refills: 1 | Status: SHIPPED | OUTPATIENT
Start: 2025-06-17

## 2025-06-25 ENCOUNTER — HOSPITAL ENCOUNTER (OUTPATIENT)
Age: 55
Discharge: HOME | End: 2025-06-25
Payer: COMMERCIAL

## 2025-06-25 VITALS
HEART RATE: 90 BPM | RESPIRATION RATE: 20 BRPM | SYSTOLIC BLOOD PRESSURE: 130 MMHG | TEMPERATURE: 98 F | OXYGEN SATURATION: 97 % | DIASTOLIC BLOOD PRESSURE: 85 MMHG

## 2025-06-25 DIAGNOSIS — T81.89XA INCISIONAL IRRITATION, INITIAL ENCOUNTER: Primary | ICD-10-CM

## 2025-06-25 PROCEDURE — 99212 OFFICE O/P EST SF 10 MIN: CPT | Performed by: PHYSICIAN ASSISTANT

## 2025-06-25 RX ORDER — MUPIROCIN 2 %
1 OINTMENT (GRAM) TOPICAL 2 TIMES DAILY
Qty: 15 G | Refills: 0 | Status: SHIPPED | OUTPATIENT
Start: 2025-06-25 | End: 2025-07-09

## 2025-06-25 NOTE — DISCHARGE INSTRUCTIONS
Continue to clean with normal soap and water  Can use mupirocin ointment as directed  Watch for worsening symptoms redness drainage pain.  Otherwise reach out to your surgeon as needed  Return to ER symptoms worsen

## (undated) DIAGNOSIS — C77.9 REGIONAL LYMPH NODE METASTASIS PRESENT (HCC): ICD-10-CM

## (undated) DIAGNOSIS — Z17.0 MALIGNANT NEOPLASM OF OVERLAPPING SITES OF LEFT BREAST IN FEMALE, ESTROGEN RECEPTOR POSITIVE (HCC): Primary | ICD-10-CM

## (undated) DIAGNOSIS — Z17.0 MALIGNANT NEOPLASM OF OVERLAPPING SITES OF LEFT BREAST IN FEMALE, ESTROGEN RECEPTOR POSITIVE (HCC): ICD-10-CM

## (undated) DIAGNOSIS — C50.812 MALIGNANT NEOPLASM OF OVERLAPPING SITES OF LEFT BREAST IN FEMALE, ESTROGEN RECEPTOR POSITIVE (HCC): ICD-10-CM

## (undated) DIAGNOSIS — C50.812 MALIGNANT NEOPLASM OF OVERLAPPING SITES OF LEFT BREAST IN FEMALE, ESTROGEN RECEPTOR POSITIVE (HCC): Primary | ICD-10-CM

## (undated) DEVICE — LAPAROTOMY SPONGE - RF AND X-RAY DETECTABLE PRE-WASHED: Brand: SITUATE

## (undated) DEVICE — SKIN MARKER DUAL TIP WITH RULER CAP AND LABELS: Brand: DEVON

## (undated) DEVICE — 3M™ STERI-DRAPE™ INSTRUMENT POUCH 1018: Brand: STERI-DRAPE™

## (undated) DEVICE — 3M™ TEGADERM™ TRANSPARENT FILM DRESSING FRAME STYLE, 1624W, US-VER, 100/CARTON 4 CARTONS/CASE: Brand: 3M™ TEGADERM™

## (undated) DEVICE — CABLE BIPOLAR 12FT DISPOSABLE

## (undated) DEVICE — PROXIMATE SKIN STAPLERS (35 WIDE) CONTAINS 35 STAINLESS STEEL STAPLES (FIXED HEAD): Brand: PROXIMATE

## (undated) DEVICE — STERILE POLYISOPRENE POWDER-FREE SURGICAL GLOVES: Brand: PROTEXIS

## (undated) DEVICE — BRA SURGICAL ELIZABETH PINK L

## (undated) DEVICE — DRAIN ROUND HUBLESS 15FR

## (undated) DEVICE — DERMABOND CLOSURE 0.7ML TOPICL

## (undated) DEVICE — SYRINGE 10ML LL CONTRL SYRINGE

## (undated) DEVICE — #15 STERILE STAINLESS BLADE: Brand: STERILE STAINLESS BLADES

## (undated) DEVICE — SOL NACL IRRIG 0.9% 1000ML BTL

## (undated) DEVICE — #10 STERILE BLADE: Brand: POLYMER COATED BLADES

## (undated) DEVICE — EVACUATOR URO RELIVAC 100CC

## (undated) DEVICE — PLASTIC BREAST CDS-LF: Brand: MEDLINE INDUSTRIES, INC.

## (undated) DEVICE — 3M™ IOBAN™ 2 ANTIMICROBIAL INCISE DRAPE 6648EZ: Brand: IOBAN™ 2

## (undated) DEVICE — SUT ETHILON 3-0 PS-2 1669H

## (undated) DEVICE — SUT VICRYL 0 CT-1 J260H

## (undated) DEVICE — FOAM ARMBOARD POSITION 3X5X24

## (undated) DEVICE — SUT SILK 2-0 SH K833H

## (undated) DEVICE — LIGACLIP MCA MULTIPLE CLIP APPLIERS, 30 MEDIUM CLIPS: Brand: LIGACLIP

## (undated) DEVICE — SLEEVE KENDALL SCD EXPRESS MED

## (undated) DEVICE — DRAPE SURG 18X24

## (undated) DEVICE — DRESSING BIOPATCH 1X4 BLUE

## (undated) DEVICE — EVACUATOR RELIAVAC 100CC

## (undated) DEVICE — PEN SKIN MARKING REG TIP VIOLT

## (undated) DEVICE — MEGADYNE E-Z CLEAN BLADE 2.75"

## (undated) DEVICE — SUT MONOCRYL 4-0 PS-2 Y496G

## (undated) DEVICE — SUT VICRYL 3-0 SH J416H

## (undated) NOTE — LETTER
Patient Name: Roderick Hollins  YOB: 1970          MRN :  [id number]  Date:  10/11/2023  Referring Physician:  Joy Vivas    Progress Summary  Pt has attended 20/26 visits in Occupational Therapy. Subjective:   Pt reports she traveled by air this past weekend. She wore a custom-fit compression sleeve for air travel. At her destination, it was cold so she continued to wear her compression sleeve for the weekend as her coat wouldn't fit over the reduction garment/bandages. Did not travel with Flexitouch Plus device, but did complete self-manual lymph drainage. Today has had Right UE compression off for 30-60 min; removed as she was making apple crisp and did not want to get her compression dirty. For usual Flexitouch device use, gets home from work at 7pm during the work week so only has time to use Flexitouch Plus device on the weekends. Assessment:   Pt returns to therapy today after last being seen on 10/4. She returns with a rise in her volume as compared to measurements on 9/25. Rise in volume brings her back to level measured when she returned to therapy on 9/13. However, there has been some overall reduction in volume. Progress negatively impacted by recent deferment of compression for reduction purposes, and possibly impacted by air travel and not wearing compression this afternoon. Recommend continued therapy to maximize pt's volume loss and improvement in tissue quality prior to being measured for custom-fit garments. Objective:  Pt attending Occupational Therapy for complete decongestive therapy of Left upper quadrant. EDEMA/TISSUE QUALITY:   *Left UE: While limb volume had reduced by a total of 14.7cm by 9/25. Today, pt measuring with a rise in limb volume bringing it back to level measured on pt's return to therapy on 9/13.  Overall, there has been a reduction of 7.5cm in limb volume since initial eval. Left UE is 26.2cm larger than dominant Right, with Left to Right limb differential decreased by an overall 11.6cm. *Left UE:  Tissue quality over upper arm and elbow is with good superficial tissue mobility. Medial side of ventral surface of forearm is to boggy, slightly pitting with fair to good superficial tissue mobility; lateral side and dorsal surface is boggy to slightly boggy, slightly pitting with fair to good superficial tissue mobility; distal surfaces more involved than proximal. Dorsum of hand is boggy to slightly boggy, pitting; fingers are slightly boggy to supple. *Left breast/trunk:  Minimal, slightly boggy lymphedema over inferior surface of breast. Subtle, lymphedema over posterior trunk superior to scapular spine and adjacent to axilla. TREATMENT:  *Pt arrived without compression on.   *Re-assessment of status   *Left upper quadrant manual lymph drainage with soft tissue mobilization of dense areas of tissue and extended focus over forearm. Observed improvement in superficial tissue mobility of forearm by end of MLD.   *Discussed future appt for measuring for custom-fit garment. Pt considering meeting with garment specialist on 10/17. Advised pt that with rise in volume measured today that her volume may not reduce to a lower level by then and the intent of custom-fit garments is to measure limb when it is at its smallest. Bison agreement for pt to be measured on 10/31. *Applied bandage system   COMPRESSION:  *Left UE: stockinet; 1/4\" foam insert over dorsum of hand/wrist crease; Cellona wrap; 3 short stretch compression bandages: 1, 4cm, 1, 8cm, 1, 10cm (DEFERRED -- Circaid arm and hand reduction garments)     Goals:  (to be met in 18 visits)  1. Pt will be independent in decongestive exercises. ACHIEVED   2. Pt will be independent in self-manual lymph drainage. ACHIEVED   3. Pt will obtain necessary supplies for reduction phase of therapy. ACHIEVED   4. Pt will tolerate Left UE short stretch compression bandaging for 20-23 hours.  ACHIEVED 5. Pt/Caregiver will be independent in Left UE short stretch compression bandaging. ACHIEVED     6. Reduce Left UE lymphedema volume by 20-25cm to improve pt's comfort and self-esteem and allow pt to be re-fit for custom-fit garments. 7. Reduce Left UE lymphedema density to slightly boggy to reduce infection risk. 8. Pt will be independent in use of compression garments, self-manual lymph drainage, decongestive exercises and lymphedema precautions for life-long self-management of lymphedema. Plan: Continue skilled Occupational Therapy 2 x/week or a total of 6 more visits. Treatment will include: Left upper quadrant manual lymph drainage with soft tissue mobilization of dense areas of tissue; assist with garment prescription. Patient/Family/Caregiver was advised of these findings, precautions, and treatment options and has agreed to actively participate in planning and for this course of care. Thank you for your referral. If you have any questions, please contact me at Dept: [phone]. Sincerely,  Electronically signed by therapist: Benitez Rodriguez. GWEN Cloud/L, [employer] Notice to Patient: Medical documents like this are made available to patients in the interest of transparency. However, be advised this is a medical document and it is intended as [id number] communication between your medical providers. This medical document may contain abbreviations, assessments, medical data, and results or other terms that are unfamiliar. Medical documents are intended to carry relevant information, facts as evident, and the clinical opinion of the practitioner. As such, this medical document may be written in language that appears blunt or direct. You are encouraged to contact your medical provider and/or [facility] if you have any questions about this medical document.

## (undated) NOTE — LETTER
OUTSIDE TESTING RESULT REQUEST     IMPORTANT: FOR YOUR IMMEDIATE ATTENTION  Please FAX all test results listed below to: [phone]     Testing already done on or about: 2023     * * * * If testing is NOT complete, arrange with patient A.S.A.P. * * * *      Patient Name: Jenny Benitez  Surgery Date: 2023  Medical Record: [id number]  CSN: [id number]  : 3/19/1970 - A: 52 y     Sex: female  Surgeon(s):  Lonnell Lanes., MD  Procedure: Right breast balancing reduction   Anesthesia Type: General     Surgeon: Lonnell Lanes., MD     The following Testing and Time Line are REQUIRED PER ANESTHESIA     EKG READ AND SIGNED WITHIN   90 days      Thank Za Reynoso,   Sent by: Clark Rowan RN

## (undated) NOTE — LETTER
Patient Name: Nadeem Beckett  YOB: 1970          MRN number:  [id number]  Date:  1/5/2022  Referring Physician:  Anaya Diaz    Dear Dr. Washington Wilkins Summary  Pt has [id number] cancelled 2, and no shown 0 visits in Occupational The above elbow crease 34.4 32   5cm above elbow crease 31.0 28.5   Elbow crease 25.0 24.5          18  cm above ulnar styloid    24.9    24.2   15cm above ulnar styloid 24.2 23.8   10cm above ulnar styloid 21.3 22   5cm above ulnar styloid 17.3 17.3   Ulnar s is healed after RT daily. Thank you for your referral. If you have any questions, please contact me at Dept: [phone].     Sincerely,  Electronically signed by therapist: GWEN Andino/L, EMILIANO    Physician's certification required: NO

## (undated) NOTE — LETTER
To: Dr. Erika Tapia  Date:  5/30/2023  Patient Name: Jose Charles / Sex: 3/19/1970-A: 52 y  female  Medical Records: [id number]   CSN: [id number]      Clearance for Surgery Requested by Surgeon    Request for:  Medical Clearance    Requested by Surgeon: Norman Lagunas MD    Surgical Date: 6/13/2023    Procedure: Right breast balancing reduction, Right      Please fax the clearance note to the [facility]  department. Thank you.

## (undated) NOTE — LETTER
To Whom It May Concern: This certifies that Dorothy Kinney was seen in my office today. Please excuse her from work today. Do not hesitate to call with any questions or concerns.         Sincerely,    Ana Maria Cast MD  [address] [place] [address],  [phone] [facility] [address]  [address] [phone]        Document generated by:  Rolando Zelaya RN

## (undated) NOTE — LETTER
To Whom It May Concern: This certifies that Chloe Joshi was seen in my office today. Please excuse her from work today. Do not hesitate to call with any questions or concerns.         Sincerely,          Lillie Solo MD  [facility]

## (undated) NOTE — LETTER
ASTHMA ACTION PLAN for Makeda Garduno     : 3/19/1970     Date: 24  Doctor:  Camille Guzman MD  Phone for doctor or clinic: [facility], [address] [place] [id number] [phone]      ACT Score: 25    ACT Goal: 20 or greater    Call your provider if you require your rescue/quick reliever medication more than 2-3 times in a 24 hour period.    If you require your rescue inhaler/medication more than 2-3 times weekly, your asthma may not be under proper control and you should seek medical attention.    *Quick Relievers are Xopenex and Albuterol*    You can use the colors of a traffic light to help learn about your asthma medicines.  Year Round       1. Green - Go! % of Personal Best Peak Flow   Use controller medicine.   Breathing is good  No cough or wheeze  Can work and play Medicine How much to take When to take it    Medications       Sympathomimetics Instructions     albuterol (PROAIR HFA) 108 (90 Base) MCG/ACT Inhalation Aero Soln Inhale 2 puffs into the lungs every 4 (four) hours as needed for Wheezing or Shortness of Breath (cough).                    2. Yellow - Caution. 50-79% Personal Best Peak Flow  Use reliever medicine to keep an asthma attack from getting bad.   Cough  Quick Relievers  Wheezing  Tight Chest  Wake up at night Medicine How much to take When to take it    If symptoms are not improving in 24-48 hrs, call office for further instructions  Medications       Sympathomimetics Instructions     albuterol (PROAIR HFA) 108 (90 Base) MCG/ACT Inhalation Aero Soln Inhale 2 puffs into the lungs every 4 (four) hours as needed for Wheezing or Shortness of Breath (cough).                    3. Red - Stop! Danger! <50% Personal Best Peak Flow  Continue Controller Medications But ADD:   Medicine not helping  Breathing is hard and fast  Nose opens wide  Can't walk  Ribs show  Can't talk well Medicine How much to take When to  take it    If your symptoms do not improve in ONE hour -  go to the emergency room or call 911 immediately! If symptoms improve, call office for appointment immediately.    Albuterol inhaler 2 puffs every 20 minutes for three treatments       Don't forget:  Rinse mouth after using inhaler  Use spacer for inhaler  Remember to get your Flu vaccine every fall!    [x] Asthma Action Plan reviewed with the caregiver and patient, and a copy of the plan was given to the patient/caregiver.   [] Asthma Action Plan reviewed with the caregiver and patient on the phone, and copy mailed to patient/caregiver or sent via [employer].     Signatures:   Provider  Camille Guzman MD Patient  Makeda Garduno Caretaker       ASTHMA ACTION PLAN for Makeda Garduno     : 3/19/1970     Date: 2024  Provider:  Camille Guzman MD  Phone for doctor or clinic: [address] [address] [phone]    ACT Score: 25      You can use the colors of a traffic light to help learn about your asthma medicines.      1. Green - Go! % of Personal Best Peak Flow Use controller medicine.   Breathing is good  No cough or wheeze  Can work and play Medicine How much to take When to take it          2. Yellow - Caution. 50-79% Personal Best Peak  Flow.  Use reliever medicine to keep an asthma attack from getting bad.   Cough  Wheezing  Tight Chest  Wake up at night Medicine How much to take When to take it           Additional instructions         3. Red - Stop! Danger!  <50% Personal Best Peak  Flow. Take these medications until  Get help from a doctor   Medicine not helping  Breathing is hard and fast  Nose opens wide  Can't walk  Ribs show  Can't talk well Medicine How much to take When to take it         Additional Instructions If your symptoms do not improve and you cannot contact your doctor, [facility] or call 911 immediately!     [x]  Asthma Action Plan reviewed with patient (and caregiver if necessary) and a copy of the plan was given to the patient/caregiver.   [] Asthma Action Plan reviewed with patient (and caregiver if necessary) on the phone and mailed copy to patient or submitted via [email or web address].     Signatures:  Provider  Camille Guzman MD   Patient Caretaker

## (undated) NOTE — LETTER
Printed: 2021    Patient Name: Charlotte Trevino  : 3/19/1970   Medical Record #: [id number]    Consent to Cancer Treatment    I, Charlotte Trevino, understand that I have been diagnosed with breast cancer (stage T2N1).     I understand that the treatme stop this treatment at any time. I have had the chance to ask questions about this treatment, and my questions have been answered to my satisfaction.   I understand that I can contact my oncologist, Dr Bridgette Boston or my Cancer Care Team at any time if I have

## (undated) NOTE — LETTER
Patient Name: Holden Tse  YOB: 1970          MRN :  [id number]  Date:  11/1/2023  Referring Physician:  Ángel Cristina    Discharge Summary  Pt has attended 25/26 visits in Occupational Therapy from 5/10-11/1/2023. Subjective:   Pt reports she was measured for new custom-fit garments on 10/31; anticipates delivery in 2 weeks. Reports use of Left UE Circaid garment during the day / has not been using compression glove very often as she has needed \"to do a lot of typing at work and I can't type while wearing the glove. \" Using nighttime garment every night. Has not used Flexitouch Plus device in over a week. Assessment:   While pt made progress towards the reduction of her Left UE lymphedema volume and density, her volume loss / improvement in tissue quality was not as anticipated. Treatment barriers appear to have been the aggressive nature of her stage II lymphedema along with some inconsistencies in pt use of compression for reduction purposes and, at times, using prior ill-fitting compression sleeves; additionally, more consistent use of her Flexitouch Plus device following the recovery from her Right breast reduction surgery would have been helpful in her quest to improve the quality of her limb. Presently, she has been re-measured for custom-fit garments. She has been highly encouraged to return for a follow-up visit once she has received her garments so the fit of her garments and her response to them can be assessed. When discussed today, pt refused to consider a return visit, stating she has too much to do / catch up on; that she is not going to do any more therapy. After her prior course of lymphedema therapy, she required multiple re-makes on her custom-fit garments so it is unfortunate that she is unwilling to return for a garment check.  Pt to be discharged at this time on her request.     Objective:  Pt attended Occupational Therapy for Left upper quadrant manual lymph drainage; re-instruction in self-manual lymph drainage; guidance in return to use of Flexitouch Plus device; assist with garment prescription. On 10/31 pt was measured for the following garments: custom-fit Elvarex compression sleeves and long length glove, CCL2; Tribute Slimline custom-fit nighttime garment. Today's session activities included: re-assessment of status; encouragement to return for follow-up visit after receiving garments; discussion of discharge recommendations / instruction in short stretch compression bandaging over Jobst Relax nighttime garment / importance of daily compression of limb stressed while waiting garment arrival; reinforcement in use of Flexitouch Plus device; Left upper quadrant manual lymph drainage followed by short stretch compression bandaging of Left UE. EDEMA/TISSUE QUALITY:   *Left UE:  Left UE  lymphedema volume reduced by 10.cm since initial eval. Left UE is 24.2cm larger than dominant Right, with a Left to Right limb differential reduction of 13.6cm. Tissue quality over upper arm and elbow is with good superficial tissue mobility. Medial side of ventral surface of forearm is to boggy, slightly pitting with fair to good superficial tissue mobility; lateral side and dorsal surface is slightly boggy, slightly pitting with fair to good superficial tissue mobility; distal surfaces more involved than proximal. Dorsum of hand is slightly boggy; fingers are slightly boggy to supple. *Left breast/trunk:  Minimal, slightly boggy lymphedema over inferior surface of breast. Subtle, lymphedema over posterior trunk superior to scapular spine and adjacent to axilla. Recommendations:  1. Continue with use of daily short stretch compression bandaging or Circaid reduction garment for Left UE compression. Consider short stretch compression bandaging over Jobst Relax nighttime garment. 2.  Daily use of Flexitouch Plus device.  If unable to use device, complete self-manual lymph drainage. 3.  Once received, wearing compression garments on a daily / nightly basis. Replace on a timely basis. 4.  Contact this therapist/department for any questions/concerns. Goals:  (to be met in 18 visits)  1. Pt will be independent in decongestive exercises. ACHIEVED   2. Pt will be independent in self-manual lymph drainage. ACHIEVED   3. Pt will obtain necessary supplies for reduction phase of therapy. ACHIEVED   4. Pt will tolerate Left UE short stretch compression bandaging for 20-23 hours. ACHIEVED   5. Pt/Caregiver will be independent in Left UE short stretch compression bandaging. ACHIEVED     6. Reduce Left UE lymphedema volume by 20-25cm to improve pt's comfort and self-esteem and allow pt to be re-fit for custom-fit garments. ANTICIPATED VOLUME REDUCTION NOT ACHIEVED; VOLUME LOSS STABILIZED, LEADING TO RE-FITTING OF CUSTOM-FIT GARMENTS  7. Reduce Left UE lymphedema density to slightly boggy to reduce infection risk. NEARLY ACHIEVED   8. Pt will be independent in use of compression garments, self-manual lymph drainage, decongestive exercises and lymphedema precautions for life-long self-management of lymphedema. ACHIEVED     Thank you for your referral. If you have any questions, please contact me at Dept: [phone]. Sincerely,  Electronically signed by therapist: GWEN Bowman/L, [email or web address] Act Notice to Patient: Medical documents like this are made available to patients in the interest of transparency. However, be advised this is a medical document and it is intended as [id number] communication between your medical providers. This medical document may contain abbreviations, assessments, medical data, and results or other terms that are unfamiliar. Medical documents are intended to carry relevant information, facts as evident, and the clinical opinion of the practitioner.  As such, this medical document may be written in language that appears blunt or direct. You are encouraged to contact your medical provider and/or [facility] if you have any questions about this medical document.

## (undated) NOTE — LETTER
Patient Name: Tariq Vazquez / Sex: 3/19/1970-A: 52 y  female   Medical Records: [id number] CSN: [id number]    ABNORMAL VALUES  Surgeon(s):  Francis Okeefe MD  Anesthesia Type: General  Date of Surgery: 6/13/2023  Procedure Description: Right breast balancing reduction   Primary Surgeon:  Francis Okeefe MD  Phone Number: [phone]    PLEASE NOTE THE FOLLOWING ABNORMALITIES:   Chemistry  ; low sodium-132  ________________________________________________________  Anesthesia to review patient's chart

## (undated) NOTE — LETTER
08/05/21    To Whom It May Concern:    Kaycee Vaughan is being treated with chemotherapy that increases her risk for infections. It would be in her best interest to be at one location to minimize her exposure risk.  Her chemotherapy treatments are every two